# Patient Record
Sex: FEMALE | Race: BLACK OR AFRICAN AMERICAN | NOT HISPANIC OR LATINO | URBAN - METROPOLITAN AREA
[De-identification: names, ages, dates, MRNs, and addresses within clinical notes are randomized per-mention and may not be internally consistent; named-entity substitution may affect disease eponyms.]

---

## 2015-05-15 RX ORDER — METOPROLOL TARTRATE 50 MG
1 TABLET ORAL
Qty: 0 | Refills: 0 | COMMUNITY
Start: 2015-05-15

## 2017-01-11 ENCOUNTER — EMERGENCY (EMERGENCY)
Facility: HOSPITAL | Age: 82
LOS: 1 days | Discharge: HOME | End: 2017-01-11
Attending: EMERGENCY MEDICINE | Admitting: EMERGENCY MEDICINE
Payer: MEDICARE

## 2017-01-11 VITALS
DIASTOLIC BLOOD PRESSURE: 74 MMHG | HEART RATE: 73 BPM | SYSTOLIC BLOOD PRESSURE: 156 MMHG | RESPIRATION RATE: 16 BRPM | TEMPERATURE: 99 F | OXYGEN SATURATION: 98 %

## 2017-01-11 VITALS
RESPIRATION RATE: 18 BRPM | OXYGEN SATURATION: 100 % | SYSTOLIC BLOOD PRESSURE: 190 MMHG | HEART RATE: 74 BPM | TEMPERATURE: 99 F | DIASTOLIC BLOOD PRESSURE: 74 MMHG

## 2017-01-11 LAB
ALBUMIN SERPL ELPH-MCNC: 3.5 G/DL — SIGNIFICANT CHANGE UP (ref 3.3–5)
ALP SERPL-CCNC: 93 U/L — SIGNIFICANT CHANGE UP (ref 40–120)
ALT FLD-CCNC: 9 U/L — SIGNIFICANT CHANGE UP (ref 4–33)
APPEARANCE UR: CLEAR — SIGNIFICANT CHANGE UP
AST SERPL-CCNC: 17 U/L — SIGNIFICANT CHANGE UP (ref 4–32)
BACTERIA # UR AUTO: SIGNIFICANT CHANGE UP
BASE EXCESS BLDV CALC-SCNC: 2.6 MMOL/L — SIGNIFICANT CHANGE UP
BASOPHILS # BLD AUTO: 0.01 K/UL — SIGNIFICANT CHANGE UP (ref 0–0.2)
BASOPHILS NFR BLD AUTO: 0.2 % — SIGNIFICANT CHANGE UP (ref 0–2)
BILIRUB SERPL-MCNC: 0.5 MG/DL — SIGNIFICANT CHANGE UP (ref 0.2–1.2)
BILIRUB UR-MCNC: NEGATIVE — SIGNIFICANT CHANGE UP
BLOOD GAS VENOUS - CREATININE: 1.25 MG/DL — SIGNIFICANT CHANGE UP (ref 0.5–1.3)
BLOOD UR QL VISUAL: NEGATIVE — SIGNIFICANT CHANGE UP
BUN SERPL-MCNC: 14 MG/DL — SIGNIFICANT CHANGE UP (ref 7–23)
CALCIUM SERPL-MCNC: 9.5 MG/DL — SIGNIFICANT CHANGE UP (ref 8.4–10.5)
CHLORIDE BLDV-SCNC: 107 MMOL/L — SIGNIFICANT CHANGE UP (ref 96–108)
CHLORIDE SERPL-SCNC: 101 MMOL/L — SIGNIFICANT CHANGE UP (ref 98–107)
CO2 SERPL-SCNC: 24 MMOL/L — SIGNIFICANT CHANGE UP (ref 22–31)
COLOR SPEC: SIGNIFICANT CHANGE UP
CREAT SERPL-MCNC: 1.3 MG/DL — SIGNIFICANT CHANGE UP (ref 0.5–1.3)
EOSINOPHIL # BLD AUTO: 0.1 K/UL — SIGNIFICANT CHANGE UP (ref 0–0.5)
EOSINOPHIL NFR BLD AUTO: 2.2 % — SIGNIFICANT CHANGE UP (ref 0–6)
GAS PNL BLDV: 138 MMOL/L — SIGNIFICANT CHANGE UP (ref 136–146)
GLUCOSE BLDV-MCNC: 154 — HIGH (ref 70–99)
GLUCOSE SERPL-MCNC: 159 MG/DL — HIGH (ref 70–99)
GLUCOSE UR-MCNC: NEGATIVE — SIGNIFICANT CHANGE UP
HCO3 BLDV-SCNC: 25 MMOL/L — SIGNIFICANT CHANGE UP (ref 20–27)
HCT VFR BLD CALC: 32.6 % — LOW (ref 34.5–45)
HCT VFR BLDV CALC: 33.2 % — LOW (ref 34.5–45)
HGB BLD-MCNC: 10.3 G/DL — LOW (ref 11.5–15.5)
HGB BLDV-MCNC: 10.8 G/DL — LOW (ref 11.5–15.5)
IMM GRANULOCYTES NFR BLD AUTO: 0.2 % — SIGNIFICANT CHANGE UP (ref 0–1.5)
KETONES UR-MCNC: NEGATIVE — SIGNIFICANT CHANGE UP
LACTATE BLDV-MCNC: 1.1 MMOL/L — SIGNIFICANT CHANGE UP (ref 0.5–2)
LEUKOCYTE ESTERASE UR-ACNC: NEGATIVE — SIGNIFICANT CHANGE UP
LYMPHOCYTES # BLD AUTO: 1.65 K/UL — SIGNIFICANT CHANGE UP (ref 1–3.3)
LYMPHOCYTES # BLD AUTO: 36.6 % — SIGNIFICANT CHANGE UP (ref 13–44)
MCHC RBC-ENTMCNC: 28.1 PG — SIGNIFICANT CHANGE UP (ref 27–34)
MCHC RBC-ENTMCNC: 31.6 % — LOW (ref 32–36)
MCV RBC AUTO: 88.8 FL — SIGNIFICANT CHANGE UP (ref 80–100)
MONOCYTES # BLD AUTO: 0.39 K/UL — SIGNIFICANT CHANGE UP (ref 0–0.9)
MONOCYTES NFR BLD AUTO: 8.6 % — SIGNIFICANT CHANGE UP (ref 2–14)
NEUTROPHILS # BLD AUTO: 2.35 K/UL — SIGNIFICANT CHANGE UP (ref 1.8–7.4)
NEUTROPHILS NFR BLD AUTO: 52.2 % — SIGNIFICANT CHANGE UP (ref 43–77)
NITRITE UR-MCNC: NEGATIVE — SIGNIFICANT CHANGE UP
PCO2 BLDV: 47 MMHG — SIGNIFICANT CHANGE UP (ref 41–51)
PH BLDV: 7.38 PH — SIGNIFICANT CHANGE UP (ref 7.32–7.43)
PH UR: 6.5 — SIGNIFICANT CHANGE UP (ref 4.6–8)
PLATELET # BLD AUTO: 312 K/UL — SIGNIFICANT CHANGE UP (ref 150–400)
PMV BLD: 9.7 FL — SIGNIFICANT CHANGE UP (ref 7–13)
PO2 BLDV: < 24 MMHG — LOW (ref 35–40)
POTASSIUM BLDV-SCNC: 3.8 MMOL/L — SIGNIFICANT CHANGE UP (ref 3.4–4.5)
POTASSIUM SERPL-MCNC: 4.2 MMOL/L — SIGNIFICANT CHANGE UP (ref 3.5–5.3)
POTASSIUM SERPL-SCNC: 4.2 MMOL/L — SIGNIFICANT CHANGE UP (ref 3.5–5.3)
PROT SERPL-MCNC: 7.4 G/DL — SIGNIFICANT CHANGE UP (ref 6–8.3)
PROT UR-MCNC: 20 — SIGNIFICANT CHANGE UP
RBC # BLD: 3.67 M/UL — LOW (ref 3.8–5.2)
RBC # FLD: 14.5 % — SIGNIFICANT CHANGE UP (ref 10.3–14.5)
RBC CASTS # UR COMP ASSIST: SIGNIFICANT CHANGE UP (ref 0–?)
SAO2 % BLDV: 34.9 % — LOW (ref 60–85)
SODIUM SERPL-SCNC: 140 MMOL/L — SIGNIFICANT CHANGE UP (ref 135–145)
SP GR SPEC: 1.01 — SIGNIFICANT CHANGE UP (ref 1–1.03)
SQUAMOUS # UR AUTO: SIGNIFICANT CHANGE UP
UROBILINOGEN FLD QL: NORMAL E.U. — SIGNIFICANT CHANGE UP (ref 0.1–0.2)
WBC # BLD: 4.51 K/UL — SIGNIFICANT CHANGE UP (ref 3.8–10.5)
WBC # FLD AUTO: 4.51 K/UL — SIGNIFICANT CHANGE UP (ref 3.8–10.5)
WBC UR QL: SIGNIFICANT CHANGE UP (ref 0–?)

## 2017-01-11 PROCEDURE — 99285 EMERGENCY DEPT VISIT HI MDM: CPT | Mod: 25,GC

## 2017-01-11 PROCEDURE — 74177 CT ABD & PELVIS W/CONTRAST: CPT | Mod: 26

## 2017-01-11 PROCEDURE — 99053 MED SERV 10PM-8AM 24 HR FAC: CPT

## 2017-01-11 RX ORDER — MORPHINE SULFATE 50 MG/1
2 CAPSULE, EXTENDED RELEASE ORAL ONCE
Qty: 0 | Refills: 0 | Status: DISCONTINUED | OUTPATIENT
Start: 2017-01-11 | End: 2017-01-11

## 2017-01-11 RX ORDER — SODIUM CHLORIDE 9 MG/ML
500 INJECTION INTRAMUSCULAR; INTRAVENOUS; SUBCUTANEOUS ONCE
Qty: 0 | Refills: 0 | Status: COMPLETED | OUTPATIENT
Start: 2017-01-11 | End: 2017-01-11

## 2017-01-11 RX ADMIN — MORPHINE SULFATE 2 MILLIGRAM(S): 50 CAPSULE, EXTENDED RELEASE ORAL at 04:04

## 2017-01-11 RX ADMIN — SODIUM CHLORIDE 500 MILLILITER(S): 9 INJECTION INTRAMUSCULAR; INTRAVENOUS; SUBCUTANEOUS at 02:41

## 2017-01-11 RX ADMIN — MORPHINE SULFATE 2 MILLIGRAM(S): 50 CAPSULE, EXTENDED RELEASE ORAL at 02:41

## 2017-01-11 NOTE — ED PROVIDER NOTE - PROGRESS NOTE DETAILS
Radiology resident reports b/l inguinal hernias but no acute process on CT. General surgery consulted (per their records she saw Dr. Nuñez). Will see pt in ED.

## 2017-01-11 NOTE — ED ADULT NURSE NOTE - OBJECTIVE STATEMENT
pt c/o of worsening right groin pain, came from PCP saying she has a inguinal hernia. pt has visible mass in right groin, vitally stable as noted, denies chest pain, SOB, n/v/d, dizziness, headache, abdominal pain, dysuria. pt a/ox3, ambulatory at baseline, lives alone. MD at bedside, awaiting further orders, will continue to monitor.

## 2017-01-11 NOTE — ED ADULT NURSE NOTE - CHIEF COMPLAINT QUOTE
Pt brought in by [facility] c/o groin pain that has worsen today. Pt reports difficulty walking due to pain. Hx of inguinal hernia.

## 2017-01-11 NOTE — ED PROVIDER NOTE - CARE PLAN
Principal Discharge DX:	Inguinal hernia, right  Secondary Diagnosis:	Chronic anemia Principal Discharge DX:	Inguinal hernia, right  Instructions for follow-up, activity and diet:	-Please call Dr. Osmin Land (surgery) for an appointment next Tuesday [phone].  -You can take Tylenol or Motrin over the counter per label instructions with food as needed for pain  Secondary Diagnosis:	Chronic anemia

## 2017-01-11 NOTE — ED PROVIDER NOTE - OBJECTIVE STATEMENT
89F that ambulates with a cane and walker, also history of DM-2, HTN, dyslipidemia, Constipation p/w r abdominal pain for weeks; no vomiting; diarrhea; dysuria; no fever; n; last BM: yesterday - soft; only transient relief from tylenol;     meds: amlopdine, asa 81, gapapentin, insulni, lovasttin, metoprolol, pioglitazone    doctor: Ashleigh Myers 89F that ambulates with a cane and walker, also history of DM-2, HTN, dyslipidemia and right inguinal hernia p/w right abdominal pain for weeks; no vomiting; diarrhea; dysuria; no fever; n; last BM: yesterday - soft; only transient relief from tylenol;     meds: amlopdine, asa 81, gapapentin, insulni, lovasttin, metoprolol, pioglitazone    doctor: Ashleigh Myers 89F that ambulates with a cane and walker, also history of DM-2, HTN, dyslipidemia and right inguinal hernia p/w right abdominal pain for weeks; no vomiting; diarrhea; dysuria; no fever; n; last BM: yesterday - soft; only transient relief from tylenol;     meds: amlodipine, asa 81, Gabapentin, Insulin, Lovastatin, metoprolol, pioglitazone    doctor: Ashleigh Myers

## 2017-01-11 NOTE — ED PROVIDER NOTE - PLAN OF CARE
-Please call Dr. Osmin Land (surgery) for an appointment next Tuesday [phone].  -You can take Tylenol or Motrin over the counter per label instructions with food as needed for pain

## 2017-01-11 NOTE — ED PROVIDER NOTE - PMH
Arthritis    DM (Diabetes Mellitus)    Hypercholesterolemia    Hypertension    Hypoglycemia associated with diabetes    Osteoarthritis

## 2017-01-11 NOTE — ED PROVIDER NOTE - ATTENDING CONTRIBUTION TO CARE
I was physically present for the E/M service provided. I agree with above history, physical, and plan which I have reviewed and edited where appropriate. I was physically present for the key portions of the service provided.    89 y/o female with PMH of right inguinal hernia p/w several weeks of intermittent lower abdominal pain. No N/V/D or dysuria. Pt reports she has seen a surgeon outpt and is pending a CT. Pt lives alone ambulates with a cane. States the pain is becoming intolerable and she is having trouble managing ADLs.  +Suprapubic TTP, abd soft, reducible right inguinal hernia  Will check CT a/p, UA and labs  Pt may require social support, SW, PT/OT eval and gen surg eval

## 2017-01-11 NOTE — ED ADULT TRIAGE NOTE - CHIEF COMPLAINT QUOTE
Pt brought in by [facility] c/o groin pain that has worsen today. Pt reports difficulty walking due to pain. Hx of inguinal hernia.

## 2017-01-17 ENCOUNTER — APPOINTMENT (OUTPATIENT)
Dept: TRAUMA SURGERY | Facility: CLINIC | Age: 82
End: 2017-01-17

## 2017-02-16 ENCOUNTER — EMERGENCY (EMERGENCY)
Facility: HOSPITAL | Age: 82
LOS: 1 days | Discharge: HOME | End: 2017-02-16
Attending: EMERGENCY MEDICINE | Admitting: EMERGENCY MEDICINE
Payer: MEDICARE

## 2017-02-16 VITALS
TEMPERATURE: 98 F | OXYGEN SATURATION: 99 % | DIASTOLIC BLOOD PRESSURE: 62 MMHG | SYSTOLIC BLOOD PRESSURE: 139 MMHG | HEART RATE: 88 BPM | RESPIRATION RATE: 18 BRPM

## 2017-02-16 VITALS
SYSTOLIC BLOOD PRESSURE: 135 MMHG | DIASTOLIC BLOOD PRESSURE: 55 MMHG | HEART RATE: 74 BPM | OXYGEN SATURATION: 98 % | RESPIRATION RATE: 17 BRPM

## 2017-02-16 LAB
ALBUMIN SERPL ELPH-MCNC: 3.5 G/DL — SIGNIFICANT CHANGE UP (ref 3.3–5)
ALP SERPL-CCNC: 83 U/L — SIGNIFICANT CHANGE UP (ref 40–120)
ALT FLD-CCNC: 9 U/L — SIGNIFICANT CHANGE UP (ref 4–33)
APPEARANCE UR: CLEAR — SIGNIFICANT CHANGE UP
AST SERPL-CCNC: 13 U/L — SIGNIFICANT CHANGE UP (ref 4–32)
BACTERIA # UR AUTO: SIGNIFICANT CHANGE UP
BASE EXCESS BLDV CALC-SCNC: 2.6 MMOL/L — SIGNIFICANT CHANGE UP
BASOPHILS # BLD AUTO: 0.01 K/UL — SIGNIFICANT CHANGE UP (ref 0–0.2)
BASOPHILS NFR BLD AUTO: 0.2 % — SIGNIFICANT CHANGE UP (ref 0–2)
BILIRUB SERPL-MCNC: 1.3 MG/DL — HIGH (ref 0.2–1.2)
BILIRUB UR-MCNC: NEGATIVE — SIGNIFICANT CHANGE UP
BLOOD GAS VENOUS - CREATININE: 1.55 MG/DL — HIGH (ref 0.5–1.3)
BLOOD UR QL VISUAL: NEGATIVE — SIGNIFICANT CHANGE UP
BUN SERPL-MCNC: 17 MG/DL — SIGNIFICANT CHANGE UP (ref 7–23)
CALCIUM SERPL-MCNC: 8.7 MG/DL — SIGNIFICANT CHANGE UP (ref 8.4–10.5)
CHLORIDE BLDV-SCNC: 102 MMOL/L — SIGNIFICANT CHANGE UP (ref 96–108)
CHLORIDE SERPL-SCNC: 94 MMOL/L — LOW (ref 98–107)
CO2 SERPL-SCNC: 27 MMOL/L — SIGNIFICANT CHANGE UP (ref 22–31)
COLOR SPEC: SIGNIFICANT CHANGE UP
CREAT SERPL-MCNC: 1.51 MG/DL — HIGH (ref 0.5–1.3)
EOSINOPHIL # BLD AUTO: 0.04 K/UL — SIGNIFICANT CHANGE UP (ref 0–0.5)
EOSINOPHIL NFR BLD AUTO: 0.6 % — SIGNIFICANT CHANGE UP (ref 0–6)
GAS PNL BLDV: 130 MMOL/L — LOW (ref 136–146)
GLUCOSE BLDV-MCNC: 327 — HIGH (ref 70–99)
GLUCOSE SERPL-MCNC: 331 MG/DL — HIGH (ref 70–99)
GLUCOSE UR-MCNC: 1000 — HIGH
HCO3 BLDV-SCNC: 25 MMOL/L — SIGNIFICANT CHANGE UP (ref 20–27)
HCT VFR BLD CALC: 30.7 % — LOW (ref 34.5–45)
HCT VFR BLDV CALC: 32.4 % — LOW (ref 34.5–45)
HGB BLD-MCNC: 10.2 G/DL — LOW (ref 11.5–15.5)
HGB BLDV-MCNC: 10.5 G/DL — LOW (ref 11.5–15.5)
HYALINE CASTS # UR AUTO: SIGNIFICANT CHANGE UP (ref 0–?)
IMM GRANULOCYTES NFR BLD AUTO: 0.3 % — SIGNIFICANT CHANGE UP (ref 0–1.5)
KETONES UR-MCNC: NEGATIVE — SIGNIFICANT CHANGE UP
LACTATE BLDV-MCNC: 2.1 MMOL/L — HIGH (ref 0.5–2)
LEUKOCYTE ESTERASE UR-ACNC: NEGATIVE — SIGNIFICANT CHANGE UP
LIDOCAIN IGE QN: 31.8 U/L — SIGNIFICANT CHANGE UP (ref 7–60)
LYMPHOCYTES # BLD AUTO: 1.33 K/UL — SIGNIFICANT CHANGE UP (ref 1–3.3)
LYMPHOCYTES # BLD AUTO: 21.3 % — SIGNIFICANT CHANGE UP (ref 13–44)
MCHC RBC-ENTMCNC: 28.6 PG — SIGNIFICANT CHANGE UP (ref 27–34)
MCHC RBC-ENTMCNC: 33.2 % — SIGNIFICANT CHANGE UP (ref 32–36)
MCV RBC AUTO: 86 FL — SIGNIFICANT CHANGE UP (ref 80–100)
MONOCYTES # BLD AUTO: 0.78 K/UL — SIGNIFICANT CHANGE UP (ref 0–0.9)
MONOCYTES NFR BLD AUTO: 12.5 % — SIGNIFICANT CHANGE UP (ref 2–14)
NEUTROPHILS # BLD AUTO: 4.05 K/UL — SIGNIFICANT CHANGE UP (ref 1.8–7.4)
NEUTROPHILS NFR BLD AUTO: 65.1 % — SIGNIFICANT CHANGE UP (ref 43–77)
NITRITE UR-MCNC: NEGATIVE — SIGNIFICANT CHANGE UP
PCO2 BLDV: 51 MMHG — SIGNIFICANT CHANGE UP (ref 41–51)
PH BLDV: 7.36 PH — SIGNIFICANT CHANGE UP (ref 7.32–7.43)
PH UR: 6.5 — SIGNIFICANT CHANGE UP (ref 4.6–8)
PLATELET # BLD AUTO: 297 K/UL — SIGNIFICANT CHANGE UP (ref 150–400)
PMV BLD: 9.5 FL — SIGNIFICANT CHANGE UP (ref 7–13)
PO2 BLDV: < 24 MMHG — LOW (ref 35–40)
POTASSIUM BLDV-SCNC: 4.1 MMOL/L — SIGNIFICANT CHANGE UP (ref 3.4–4.5)
POTASSIUM SERPL-MCNC: 4.4 MMOL/L — SIGNIFICANT CHANGE UP (ref 3.5–5.3)
POTASSIUM SERPL-SCNC: 4.4 MMOL/L — SIGNIFICANT CHANGE UP (ref 3.5–5.3)
PROT SERPL-MCNC: 7.4 G/DL — SIGNIFICANT CHANGE UP (ref 6–8.3)
PROT UR-MCNC: 20 — SIGNIFICANT CHANGE UP
RBC # BLD: 3.57 M/UL — LOW (ref 3.8–5.2)
RBC # FLD: 14.8 % — HIGH (ref 10.3–14.5)
RBC CASTS # UR COMP ASSIST: SIGNIFICANT CHANGE UP (ref 0–?)
SAO2 % BLDV: 23 % — LOW (ref 60–85)
SODIUM SERPL-SCNC: 135 MMOL/L — SIGNIFICANT CHANGE UP (ref 135–145)
SP GR SPEC: 1 — SIGNIFICANT CHANGE UP (ref 1–1.03)
SQUAMOUS # UR AUTO: SIGNIFICANT CHANGE UP
UROBILINOGEN FLD QL: NORMAL E.U. — SIGNIFICANT CHANGE UP (ref 0.1–0.2)
WBC # BLD: 6.23 K/UL — SIGNIFICANT CHANGE UP (ref 3.8–10.5)
WBC # FLD AUTO: 6.23 K/UL — SIGNIFICANT CHANGE UP (ref 3.8–10.5)
WBC UR QL: SIGNIFICANT CHANGE UP (ref 0–?)

## 2017-02-16 PROCEDURE — 99284 EMERGENCY DEPT VISIT MOD MDM: CPT | Mod: GC

## 2017-02-16 RX ORDER — MORPHINE SULFATE 50 MG/1
4 CAPSULE, EXTENDED RELEASE ORAL ONCE
Qty: 0 | Refills: 0 | Status: DISCONTINUED | OUTPATIENT
Start: 2017-02-16 | End: 2017-02-16

## 2017-02-16 RX ORDER — ONDANSETRON 8 MG/1
4 TABLET, FILM COATED ORAL ONCE
Qty: 0 | Refills: 0 | Status: COMPLETED | OUTPATIENT
Start: 2017-02-16 | End: 2017-02-16

## 2017-02-16 RX ORDER — SODIUM CHLORIDE 9 MG/ML
1000 INJECTION INTRAMUSCULAR; INTRAVENOUS; SUBCUTANEOUS ONCE
Qty: 0 | Refills: 0 | Status: COMPLETED | OUTPATIENT
Start: 2017-02-16 | End: 2017-02-16

## 2017-02-16 RX ADMIN — MORPHINE SULFATE 4 MILLIGRAM(S): 50 CAPSULE, EXTENDED RELEASE ORAL at 13:10

## 2017-02-16 RX ADMIN — MORPHINE SULFATE 4 MILLIGRAM(S): 50 CAPSULE, EXTENDED RELEASE ORAL at 13:27

## 2017-02-16 RX ADMIN — SODIUM CHLORIDE 1000 MILLILITER(S): 9 INJECTION INTRAMUSCULAR; INTRAVENOUS; SUBCUTANEOUS at 13:11

## 2017-02-16 RX ADMIN — ONDANSETRON 4 MILLIGRAM(S): 8 TABLET, FILM COATED ORAL at 13:11

## 2017-02-16 NOTE — ED ADULT TRIAGE NOTE - CHIEF COMPLAINT QUOTE
Patient brought to ER from home by EMS for hernia pain. Pt has had c/o abdominal pain and has had it for a while and has been evaluated for same about a month ago.

## 2017-02-16 NOTE — ED PROVIDER NOTE - MEDICAL DECISION MAKING DETAILS
89 y/o F with bilateral reducible inguinal hernias with no tenderness and no signs/symptoms of incarceration. Will check labs for any indications of ischemia-cbc, cmp, vbg w/ lactate 89 y/o F with bilateral reducible inguinal hernias with no tenderness and no signs/symptoms of incarceration. Will check labs for any indications of ischemia-cbc, cmp, vbg w/ lactate  Attendin89 y/o with NT bilateral hernias. Basic labs, lactate, surgical consult if warranted.

## 2017-02-16 NOTE — ED ADULT NURSE NOTE - OBJECTIVE STATEMENT
Received pt into spot #17. Pt A/o x 3 calm cooperative, no acute distress noted. Pt c/o "hernia pain" stating she has 2 hernias that have been hurting her for "months". Pt points to RLQ and suprapubic area. Areas noted to have a bulge -hernia like. Positive bowel sounds all quads. pt denies any N/V/D. Son at bedside. Pt eval by Dr. Nettles. Bloodwork sent as ordered. Received pt into spot #17. Pt A/o x 3 calm cooperative, no acute distress noted. Pt c/o "hernia pain" stating she has 2 hernias that have been hurting her for "months". Pt points to RLQ and suprapubic area. Areas noted to have a bulge -hernia like. Positive bowel sounds all quads. pt denies any N/V/D. Skin intact. Son at bedside. Pt eval by Dr. Nettles. Bloodwork sent as ordered.

## 2017-02-16 NOTE — ED PROVIDER NOTE - OBJECTIVE STATEMENT
89 y/o F DM2, HTN, HLD presents with bilateral inguinal hernias. Is here today because she was having pain from hernias and had to call son to help her cook breakfast and get dressed. She does not have any assistance. Patient has been seen in [facility] ED for same reason in past. Scheduled to have surgery with Dr. Alanis on 3/6/16 and states she was supposed to go for presurgical testing today. Last BM today. Denies fever, chills, nausea, vomiting, diarrhea. 89 y/o F DM2, HTN, HLD presents with bilateral inguinal hernias. Is here today because she was having pain from hernias and had to call son to help her cook breakfast and get dressed. She does not have any assistance. Patient has been seen in [facility] ED for same reason in past. Scheduled to have surgery at [facility] on 3/5/16 and states she was supposed to go for presurgical testing today. Last BM today. Denies fever, chills, nausea, vomiting, diarrhea.

## 2017-02-16 NOTE — ED PROVIDER NOTE - ATTENDING CONTRIBUTION TO CARE
Attending note: I have discussed with the resident the following: medical history associated with presenting event, assessment, PMH, physical examination, conclusions. Agree with the proposed medical action plan and management strategy and conclusions. I have personally interviewed and examined the patient. Have reviewed the EMR where available.

## 2017-02-16 NOTE — ED ADULT NURSE REASSESSMENT NOTE - NS ED NURSE REASSESS COMMENT FT1
pt received from MARYCARMEN Olsen, AO x3, unable to ambulate by herself, c/o abdominal pain. Evaluated by provider. Due medication given as ordered. Pt denies any pain or discomfort by now. VSS at her baseline. Will continue to monitor.

## 2017-02-16 NOTE — ED ADULT NURSE REASSESSMENT NOTE - NS ED NURSE REASSESS COMMENT FT1
pt AO x3, unable to ambulate by herself, received from MARYCARMEN Olsen, just evaluated by provider. Awaiting further study.

## 2017-02-16 NOTE — ED PROVIDER NOTE - PHYSICAL EXAMINATION
Attending Note: 89 y/o female presents w/ c/o bilateral inguinal hernia, wants surgery. PMH DM2, HTN, HLD. Was having pain from hernias PTA, called son to help her cook breakfast & get dressed. She has no home assistance. Presently asymptomatic. Seen in [facility] ED for same reason in past. Presently scheduled to have surgery with Dr. Alanis 3/6/16 and was supposed to go for presurgical testing today. Last BM today, passing gas, no melena or blood per rectum. Denies: fever, chills, N/V, diarrhea, increase abdominal girth. Hernias NT to touch, neg erythema, induration or fluctuance.

## 2017-02-28 ENCOUNTER — OUTPATIENT (OUTPATIENT)
Dept: OUTPATIENT SERVICES | Facility: HOSPITAL | Age: 82
LOS: 1 days | End: 2017-02-28
Payer: COMMERCIAL

## 2017-02-28 VITALS
RESPIRATION RATE: 18 BRPM | WEIGHT: 143.96 LBS | DIASTOLIC BLOOD PRESSURE: 66 MMHG | TEMPERATURE: 97 F | OXYGEN SATURATION: 97 % | SYSTOLIC BLOOD PRESSURE: 145 MMHG | HEART RATE: 72 BPM | HEIGHT: 62 IN

## 2017-02-28 DIAGNOSIS — K40.20 BILATERAL INGUINAL HERNIA, WITHOUT OBSTRUCTION OR GANGRENE, NOT SPECIFIED AS RECURRENT: ICD-10-CM

## 2017-02-28 DIAGNOSIS — Z01.818 ENCOUNTER FOR OTHER PREPROCEDURAL EXAMINATION: ICD-10-CM

## 2017-02-28 PROCEDURE — G0463: CPT

## 2017-02-28 NOTE — H&P PST ADULT - NSANTHOSAYNRD_GEN_A_CORE
No. NITO screening performed.  STOP BANG Legend: 0-2 = LOW Risk; 3-4 = INTERMEDIATE Risk; 5-8 = HIGH Risk

## 2017-02-28 NOTE — H&P PST ADULT - PMH
Appendicitis    Arthritis    DM (Diabetes Mellitus)    Hypercholesterolemia    Hypertension    Hypoglycemia associated with diabetes    Osteoarthritis

## 2017-03-07 ENCOUNTER — OUTPATIENT (OUTPATIENT)
Dept: OUTPATIENT SERVICES | Facility: HOSPITAL | Age: 82
LOS: 1 days | End: 2017-03-07
Payer: COMMERCIAL

## 2017-03-07 DIAGNOSIS — Z01.810 ENCOUNTER FOR PREPROCEDURAL CARDIOVASCULAR EXAMINATION: ICD-10-CM

## 2017-03-08 ENCOUNTER — OUTPATIENT (OUTPATIENT)
Dept: OUTPATIENT SERVICES | Facility: HOSPITAL | Age: 82
LOS: 1 days | Discharge: HOME | End: 2017-03-08
Payer: COMMERCIAL

## 2017-03-08 ENCOUNTER — TRANSCRIPTION ENCOUNTER (OUTPATIENT)
Age: 82
End: 2017-03-08

## 2017-03-08 VITALS
OXYGEN SATURATION: 97 % | HEART RATE: 67 BPM | TEMPERATURE: 98 F | SYSTOLIC BLOOD PRESSURE: 115 MMHG | DIASTOLIC BLOOD PRESSURE: 41 MMHG | RESPIRATION RATE: 16 BRPM

## 2017-03-08 VITALS
TEMPERATURE: 97 F | SYSTOLIC BLOOD PRESSURE: 142 MMHG | WEIGHT: 143.96 LBS | RESPIRATION RATE: 18 BRPM | HEART RATE: 69 BPM | DIASTOLIC BLOOD PRESSURE: 76 MMHG | OXYGEN SATURATION: 100 % | HEIGHT: 62 IN

## 2017-03-08 DIAGNOSIS — Z01.818 ENCOUNTER FOR OTHER PREPROCEDURAL EXAMINATION: ICD-10-CM

## 2017-03-08 DIAGNOSIS — K40.20 BILATERAL INGUINAL HERNIA, WITHOUT OBSTRUCTION OR GANGRENE, NOT SPECIFIED AS RECURRENT: ICD-10-CM

## 2017-03-08 PROCEDURE — 49505 PRP I/HERN INIT REDUC >5 YR: CPT | Mod: AS,50

## 2017-03-08 PROCEDURE — A9502: CPT

## 2017-03-08 PROCEDURE — C1781: CPT

## 2017-03-08 PROCEDURE — 78452 HT MUSCLE IMAGE SPECT MULT: CPT

## 2017-03-08 PROCEDURE — 93017 CV STRESS TEST TRACING ONLY: CPT

## 2017-03-08 PROCEDURE — 49505 PRP I/HERN INIT REDUC >5 YR: CPT | Mod: 50

## 2017-03-08 RX ORDER — HYDROMORPHONE HYDROCHLORIDE 2 MG/ML
0.1 INJECTION INTRAMUSCULAR; INTRAVENOUS; SUBCUTANEOUS
Qty: 0 | Refills: 0 | Status: DISCONTINUED | OUTPATIENT
Start: 2017-03-08 | End: 2017-03-08

## 2017-03-08 RX ORDER — ACETAMINOPHEN WITH CODEINE 300MG-30MG
1 TABLET ORAL EVERY 6 HOURS
Qty: 0 | Refills: 0 | Status: DISCONTINUED | OUTPATIENT
Start: 2017-03-08 | End: 2017-03-08

## 2017-03-08 RX ORDER — ACETAMINOPHEN WITH CODEINE 300MG-30MG
1 TABLET ORAL
Qty: 20 | Refills: 0 | OUTPATIENT
Start: 2017-03-08

## 2017-03-08 RX ORDER — SODIUM CHLORIDE 9 MG/ML
3 INJECTION INTRAMUSCULAR; INTRAVENOUS; SUBCUTANEOUS EVERY 8 HOURS
Qty: 0 | Refills: 0 | Status: DISCONTINUED | OUTPATIENT
Start: 2017-03-08 | End: 2017-03-08

## 2017-03-08 RX ORDER — SODIUM CHLORIDE 9 MG/ML
1000 INJECTION, SOLUTION INTRAVENOUS
Qty: 0 | Refills: 0 | Status: DISCONTINUED | OUTPATIENT
Start: 2017-03-08 | End: 2017-03-08

## 2017-03-08 RX ORDER — ONDANSETRON 8 MG/1
4 TABLET, FILM COATED ORAL EVERY 6 HOURS
Qty: 0 | Refills: 0 | Status: DISCONTINUED | OUTPATIENT
Start: 2017-03-08 | End: 2017-03-16

## 2017-03-08 NOTE — ASU DISCHARGE PLAN (ADULT/PEDIATRIC). - NOTIFY
Swelling that continues/Bleeding that does not stop/Fever greater than 101 Swelling that continues/Fever greater than 101/Bleeding that does not stop/Pain not relieved by Medications/Unable to Urinate

## 2017-03-08 NOTE — ASU DISCHARGE PLAN (ADULT/PEDIATRIC). - MEDICATION SUMMARY - MEDICATIONS TO TAKE
I will START or STAY ON the medications listed below when I get home from the hospital:    acetaminophen 325 mg oral tablet  -- 2 tab(s) by mouth every 6 hours, As needed, Severe Pain (7 - 10)  -- Indication: For Mild pain    aspirin 81 mg oral tablet, chewable  -- 1 tab(s) by mouth once a day  -- Indication: For Prophylaxis    acetaminophen-codeine 300 mg-30 mg oral tablet  -- 1 tab(s) by mouth every 6 hours, As needed, Moderate Pain (4 - 6) -for moderate pain MDD:4  -- Indication: For Moderate pain    benazepril 5 mg oral tablet  -- 1 tab(s) by mouth once a day  -- Indication: For HTN    pioglitazone 15 mg oral tablet  -- 1 tab(s) by mouth once a day  -- Indication: For DM    insulin glargine 100 units/mL subcutaneous solution  -- 8 unit(s) subcutaneous once a day (at bedtime)  -- Indication: For DM    lovastatin 20 mg oral tablet  -- 1 tab(s) by mouth once a day  -- Indication: For Hyperlipidemia    metoprolol tartrate 25 mg oral tablet  -- 1 tab(s) by mouth 2 times a day  -- Indication: For HTN    Norvasc 5 mg oral tablet  -- 1 tab(s) by mouth once a day  -- Indication: For HTN    docusate sodium 100 mg oral capsule  -- 1 cap(s) by mouth 3 times a day  -- Indication: For Constipation    Calcium 600+D oral tablet  -- 1 tab(s) by mouth 2 times a day  -- Indication: For Osteopenia

## 2017-03-15 DIAGNOSIS — K40.20 BILATERAL INGUINAL HERNIA, WITHOUT OBSTRUCTION OR GANGRENE, NOT SPECIFIED AS RECURRENT: ICD-10-CM

## 2017-03-15 DIAGNOSIS — E11.9 TYPE 2 DIABETES MELLITUS WITHOUT COMPLICATIONS: ICD-10-CM

## 2017-03-15 DIAGNOSIS — M19.90 UNSPECIFIED OSTEOARTHRITIS, UNSPECIFIED SITE: ICD-10-CM

## 2017-03-15 DIAGNOSIS — I10 ESSENTIAL (PRIMARY) HYPERTENSION: ICD-10-CM

## 2017-10-03 ENCOUNTER — EMERGENCY (EMERGENCY)
Facility: HOSPITAL | Age: 82
LOS: 1 days | Discharge: HOME | End: 2017-10-03
Attending: EMERGENCY MEDICINE | Admitting: EMERGENCY MEDICINE
Payer: MEDICARE

## 2017-10-03 VITALS
HEART RATE: 72 BPM | TEMPERATURE: 98 F | DIASTOLIC BLOOD PRESSURE: 66 MMHG | OXYGEN SATURATION: 100 % | RESPIRATION RATE: 18 BRPM | SYSTOLIC BLOOD PRESSURE: 188 MMHG

## 2017-10-03 LAB
ALBUMIN SERPL ELPH-MCNC: 3.6 G/DL — SIGNIFICANT CHANGE UP (ref 3.3–5)
ALP SERPL-CCNC: 98 U/L — SIGNIFICANT CHANGE UP (ref 40–120)
ALT FLD-CCNC: 9 U/L — SIGNIFICANT CHANGE UP (ref 4–33)
AST SERPL-CCNC: 18 U/L — SIGNIFICANT CHANGE UP (ref 4–32)
BASE EXCESS BLDV CALC-SCNC: 3.2 MMOL/L — SIGNIFICANT CHANGE UP
BASOPHILS # BLD AUTO: 0.03 K/UL — SIGNIFICANT CHANGE UP (ref 0–0.2)
BASOPHILS NFR BLD AUTO: 0.6 % — SIGNIFICANT CHANGE UP (ref 0–2)
BILIRUB SERPL-MCNC: 0.6 MG/DL — SIGNIFICANT CHANGE UP (ref 0.2–1.2)
BLOOD GAS VENOUS - CREATININE: 1.15 MG/DL — SIGNIFICANT CHANGE UP (ref 0.5–1.3)
BUN SERPL-MCNC: 15 MG/DL — SIGNIFICANT CHANGE UP (ref 7–23)
CALCIUM SERPL-MCNC: 8.8 MG/DL — SIGNIFICANT CHANGE UP (ref 8.4–10.5)
CHLORIDE BLDV-SCNC: 100 MMOL/L — SIGNIFICANT CHANGE UP (ref 96–108)
CHLORIDE SERPL-SCNC: 98 MMOL/L — SIGNIFICANT CHANGE UP (ref 98–107)
CO2 SERPL-SCNC: 25 MMOL/L — SIGNIFICANT CHANGE UP (ref 22–31)
CREAT SERPL-MCNC: 1.27 MG/DL — SIGNIFICANT CHANGE UP (ref 0.5–1.3)
EOSINOPHIL # BLD AUTO: 0.16 K/UL — SIGNIFICANT CHANGE UP (ref 0–0.5)
EOSINOPHIL NFR BLD AUTO: 3.1 % — SIGNIFICANT CHANGE UP (ref 0–6)
GAS PNL BLDV: 135 MMOL/L — LOW (ref 136–146)
GLUCOSE BLDV-MCNC: 187 — HIGH (ref 70–99)
GLUCOSE SERPL-MCNC: 183 MG/DL — HIGH (ref 70–99)
HCO3 BLDV-SCNC: 25 MMOL/L — SIGNIFICANT CHANGE UP (ref 20–27)
HCT VFR BLD CALC: 33.6 % — LOW (ref 34.5–45)
HCT VFR BLDV CALC: 34.3 % — LOW (ref 34.5–45)
HGB BLD-MCNC: 10.9 G/DL — LOW (ref 11.5–15.5)
HGB BLDV-MCNC: 11.1 G/DL — LOW (ref 11.5–15.5)
IMM GRANULOCYTES # BLD AUTO: 0.01 # — SIGNIFICANT CHANGE UP
IMM GRANULOCYTES NFR BLD AUTO: 0.2 % — SIGNIFICANT CHANGE UP (ref 0–1.5)
LACTATE BLDV-MCNC: 0.9 MMOL/L — SIGNIFICANT CHANGE UP (ref 0.5–2)
LYMPHOCYTES # BLD AUTO: 1.96 K/UL — SIGNIFICANT CHANGE UP (ref 1–3.3)
LYMPHOCYTES # BLD AUTO: 38.4 % — SIGNIFICANT CHANGE UP (ref 13–44)
MCHC RBC-ENTMCNC: 29.1 PG — SIGNIFICANT CHANGE UP (ref 27–34)
MCHC RBC-ENTMCNC: 32.4 % — SIGNIFICANT CHANGE UP (ref 32–36)
MCV RBC AUTO: 89.8 FL — SIGNIFICANT CHANGE UP (ref 80–100)
MONOCYTES # BLD AUTO: 0.6 K/UL — SIGNIFICANT CHANGE UP (ref 0–0.9)
MONOCYTES NFR BLD AUTO: 11.8 % — SIGNIFICANT CHANGE UP (ref 2–14)
NEUTROPHILS # BLD AUTO: 2.34 K/UL — SIGNIFICANT CHANGE UP (ref 1.8–7.4)
NEUTROPHILS NFR BLD AUTO: 45.9 % — SIGNIFICANT CHANGE UP (ref 43–77)
NRBC # FLD: 0 — SIGNIFICANT CHANGE UP
PCO2 BLDV: 56 MMHG — HIGH (ref 41–51)
PH BLDV: 7.33 PH — SIGNIFICANT CHANGE UP (ref 7.32–7.43)
PLATELET # BLD AUTO: 269 K/UL — SIGNIFICANT CHANGE UP (ref 150–400)
PMV BLD: 10 FL — SIGNIFICANT CHANGE UP (ref 7–13)
PO2 BLDV: < 24 MMHG — LOW (ref 35–40)
POTASSIUM BLDV-SCNC: 4.6 MMOL/L — HIGH (ref 3.4–4.5)
POTASSIUM SERPL-MCNC: 4.7 MMOL/L — SIGNIFICANT CHANGE UP (ref 3.5–5.3)
POTASSIUM SERPL-SCNC: 4.7 MMOL/L — SIGNIFICANT CHANGE UP (ref 3.5–5.3)
PROT SERPL-MCNC: 7.3 G/DL — SIGNIFICANT CHANGE UP (ref 6–8.3)
RBC # BLD: 3.74 M/UL — LOW (ref 3.8–5.2)
RBC # FLD: 14.9 % — HIGH (ref 10.3–14.5)
SAO2 % BLDV: 30.5 % — LOW (ref 60–85)
SODIUM SERPL-SCNC: 135 MMOL/L — SIGNIFICANT CHANGE UP (ref 135–145)
WBC # BLD: 5.1 K/UL — SIGNIFICANT CHANGE UP (ref 3.8–10.5)
WBC # FLD AUTO: 5.1 K/UL — SIGNIFICANT CHANGE UP (ref 3.8–10.5)

## 2017-10-03 PROCEDURE — 99284 EMERGENCY DEPT VISIT MOD MDM: CPT

## 2017-10-03 NOTE — ED ADULT NURSE NOTE - OBJECTIVE STATEMENT
Received pt in spot 15. [id number]. Pt states "I haven't been able to have a bowel movement in four months". Sent in with note from MD stating xray shows significant amount of stool in colon. Pt states pain is mostly in RLQ but sometimes radiates to left side. Denies N/V. Vs as noted. IV placed, labs sent. Son at bedside. Rpt given to primary RN Risa. Will monitor.

## 2017-10-03 NOTE — ED ADULT NURSE NOTE - CHIEF COMPLAINT QUOTE
c/o generalized abd pain "for about a month."  saw PMD last week and diagnosed w constipation.  pt given meds but they didn't help.  Sent by pmd today for "significant residual stool in colon."  denies nausea ,vomiting.  well appearing female in nad. c/o generalized abd pain "for about a month."  saw PMD last week and diagnosed w constipation.  pt given meds but they didn't help.  Sent by pmd today for "significant residual stool in colon."  denies nausea ,vomiting.  well appearing female in nad.    Henrik (son)- [phone]

## 2017-10-03 NOTE — ED ADULT TRIAGE NOTE - CHIEF COMPLAINT QUOTE
c/o generalized abd pain "for about a month."  saw PMD last week and diagnosed w constipation.  pt given meds but they didn't help.  Sent by pmd today for "significant residual stool in colon."  denies nausea ,vomiting.  well appearing female in nad.

## 2017-10-03 NOTE — ED PROVIDER NOTE - ATTENDING CONTRIBUTION TO CARE
DR. JESUS, ATTENDING MD-  I performed a face to face bedside interview with patient regarding history of present illness, review of symptoms and past medical history. I completed an independent physical exam.  I have discussed patient's plan of care with PA.   Documentation as above in the note.      Sterling: 89 yo c/o constipation.  Poor historian, denies vomiting, was eating pudding when I entered room.  Abd soft, but mildly TTP rlq, no rebound/guarding.  Otherwise benign exam.  Most likely just constipated, however given her advanced age and her unreliable history will check a CT

## 2017-10-04 VITALS
HEART RATE: 77 BPM | RESPIRATION RATE: 16 BRPM | TEMPERATURE: 98 F | OXYGEN SATURATION: 100 % | DIASTOLIC BLOOD PRESSURE: 72 MMHG | SYSTOLIC BLOOD PRESSURE: 156 MMHG

## 2017-10-04 PROCEDURE — 74177 CT ABD & PELVIS W/CONTRAST: CPT | Mod: 26

## 2018-01-01 ENCOUNTER — OUTPATIENT (OUTPATIENT)
Dept: OUTPATIENT SERVICES | Facility: HOSPITAL | Age: 83
LOS: 1 days | End: 2018-01-01

## 2018-01-26 ENCOUNTER — EMERGENCY (EMERGENCY)
Facility: HOSPITAL | Age: 83
LOS: 1 days | Discharge: HOME | End: 2018-01-26
Attending: EMERGENCY MEDICINE | Admitting: EMERGENCY MEDICINE
Payer: MEDICARE

## 2018-01-26 VITALS
SYSTOLIC BLOOD PRESSURE: 173 MMHG | HEART RATE: 88 BPM | RESPIRATION RATE: 18 BRPM | TEMPERATURE: 98 F | OXYGEN SATURATION: 98 % | DIASTOLIC BLOOD PRESSURE: 92 MMHG

## 2018-01-26 VITALS
DIASTOLIC BLOOD PRESSURE: 62 MMHG | HEART RATE: 70 BPM | SYSTOLIC BLOOD PRESSURE: 138 MMHG | TEMPERATURE: 97 F | RESPIRATION RATE: 16 BRPM | OXYGEN SATURATION: 100 %

## 2018-01-26 DIAGNOSIS — R69 ILLNESS, UNSPECIFIED: ICD-10-CM

## 2018-01-26 LAB
ALBUMIN SERPL ELPH-MCNC: 3.5 G/DL — SIGNIFICANT CHANGE UP (ref 3.3–5)
ALP SERPL-CCNC: 95 U/L — SIGNIFICANT CHANGE UP (ref 40–120)
ALT FLD-CCNC: 9 U/L — SIGNIFICANT CHANGE UP (ref 4–33)
APPEARANCE UR: CLEAR — SIGNIFICANT CHANGE UP
AST SERPL-CCNC: 18 U/L — SIGNIFICANT CHANGE UP (ref 4–32)
BACTERIA # UR AUTO: SIGNIFICANT CHANGE UP
BASE EXCESS BLDV CALC-SCNC: 2.1 MMOL/L — SIGNIFICANT CHANGE UP
BASOPHILS # BLD AUTO: 0.03 K/UL — SIGNIFICANT CHANGE UP (ref 0–0.2)
BASOPHILS NFR BLD AUTO: 0.6 % — SIGNIFICANT CHANGE UP (ref 0–2)
BILIRUB SERPL-MCNC: 0.7 MG/DL — SIGNIFICANT CHANGE UP (ref 0.2–1.2)
BILIRUB UR-MCNC: NEGATIVE — SIGNIFICANT CHANGE UP
BLOOD GAS VENOUS - CREATININE: 1.2 MG/DL — SIGNIFICANT CHANGE UP (ref 0.5–1.3)
BLOOD UR QL VISUAL: NEGATIVE — SIGNIFICANT CHANGE UP
BUN SERPL-MCNC: 15 MG/DL — SIGNIFICANT CHANGE UP (ref 7–23)
CALCIUM SERPL-MCNC: 8.6 MG/DL — SIGNIFICANT CHANGE UP (ref 8.4–10.5)
CHLORIDE BLDV-SCNC: 108 MMOL/L — SIGNIFICANT CHANGE UP (ref 96–108)
CHLORIDE SERPL-SCNC: 104 MMOL/L — SIGNIFICANT CHANGE UP (ref 98–107)
CO2 SERPL-SCNC: 25 MMOL/L — SIGNIFICANT CHANGE UP (ref 22–31)
COLOR SPEC: SIGNIFICANT CHANGE UP
CREAT SERPL-MCNC: 1.32 MG/DL — HIGH (ref 0.5–1.3)
EOSINOPHIL # BLD AUTO: 0.16 K/UL — SIGNIFICANT CHANGE UP (ref 0–0.5)
EOSINOPHIL NFR BLD AUTO: 3.1 % — SIGNIFICANT CHANGE UP (ref 0–6)
GAS PNL BLDV: 142 MMOL/L — SIGNIFICANT CHANGE UP (ref 136–146)
GLUCOSE BLDV-MCNC: 144 — HIGH (ref 70–99)
GLUCOSE SERPL-MCNC: 140 MG/DL — HIGH (ref 70–99)
GLUCOSE UR-MCNC: NEGATIVE — SIGNIFICANT CHANGE UP
HCO3 BLDV-SCNC: 24 MMOL/L — SIGNIFICANT CHANGE UP (ref 20–27)
HCT VFR BLD CALC: 34.3 % — LOW (ref 34.5–45)
HCT VFR BLDV CALC: 35.6 % — SIGNIFICANT CHANGE UP (ref 34.5–45)
HGB BLD-MCNC: 11.4 G/DL — LOW (ref 11.5–15.5)
HGB BLDV-MCNC: 11.5 G/DL — SIGNIFICANT CHANGE UP (ref 11.5–15.5)
HYALINE CASTS # UR AUTO: SIGNIFICANT CHANGE UP (ref 0–?)
IMM GRANULOCYTES # BLD AUTO: 0.02 # — SIGNIFICANT CHANGE UP
IMM GRANULOCYTES NFR BLD AUTO: 0.4 % — SIGNIFICANT CHANGE UP (ref 0–1.5)
KETONES UR-MCNC: NEGATIVE — SIGNIFICANT CHANGE UP
LACTATE BLDV-MCNC: 1.3 MMOL/L — SIGNIFICANT CHANGE UP (ref 0.5–2)
LEUKOCYTE ESTERASE UR-ACNC: NEGATIVE — SIGNIFICANT CHANGE UP
LIDOCAIN IGE QN: 33.6 U/L — SIGNIFICANT CHANGE UP (ref 7–60)
LYMPHOCYTES # BLD AUTO: 1.56 K/UL — SIGNIFICANT CHANGE UP (ref 1–3.3)
LYMPHOCYTES # BLD AUTO: 30.4 % — SIGNIFICANT CHANGE UP (ref 13–44)
MCHC RBC-ENTMCNC: 30.2 PG — SIGNIFICANT CHANGE UP (ref 27–34)
MCHC RBC-ENTMCNC: 33.2 % — SIGNIFICANT CHANGE UP (ref 32–36)
MCV RBC AUTO: 90.7 FL — SIGNIFICANT CHANGE UP (ref 80–100)
MONOCYTES # BLD AUTO: 0.43 K/UL — SIGNIFICANT CHANGE UP (ref 0–0.9)
MONOCYTES NFR BLD AUTO: 8.4 % — SIGNIFICANT CHANGE UP (ref 2–14)
MUCOUS THREADS # UR AUTO: SIGNIFICANT CHANGE UP
NEUTROPHILS # BLD AUTO: 2.94 K/UL — SIGNIFICANT CHANGE UP (ref 1.8–7.4)
NEUTROPHILS NFR BLD AUTO: 57.1 % — SIGNIFICANT CHANGE UP (ref 43–77)
NITRITE UR-MCNC: NEGATIVE — SIGNIFICANT CHANGE UP
NON-SQ EPI CELLS # UR AUTO: <1 — SIGNIFICANT CHANGE UP
NRBC # FLD: 0 — SIGNIFICANT CHANGE UP
PCO2 BLDV: 50 MMHG — SIGNIFICANT CHANGE UP (ref 41–51)
PH BLDV: 7.36 PH — SIGNIFICANT CHANGE UP (ref 7.32–7.43)
PH UR: 6.5 — SIGNIFICANT CHANGE UP (ref 4.6–8)
PLATELET # BLD AUTO: 286 K/UL — SIGNIFICANT CHANGE UP (ref 150–400)
PMV BLD: 10 FL — SIGNIFICANT CHANGE UP (ref 7–13)
PO2 BLDV: < 24 MMHG — LOW (ref 35–40)
POTASSIUM BLDV-SCNC: 4.3 MMOL/L — SIGNIFICANT CHANGE UP (ref 3.4–4.5)
POTASSIUM SERPL-MCNC: 4.5 MMOL/L — SIGNIFICANT CHANGE UP (ref 3.5–5.3)
POTASSIUM SERPL-SCNC: 4.5 MMOL/L — SIGNIFICANT CHANGE UP (ref 3.5–5.3)
PROT SERPL-MCNC: 7.3 G/DL — SIGNIFICANT CHANGE UP (ref 6–8.3)
PROT UR-MCNC: 20 MG/DL — SIGNIFICANT CHANGE UP
RBC # BLD: 3.78 M/UL — LOW (ref 3.8–5.2)
RBC # FLD: 14.7 % — HIGH (ref 10.3–14.5)
RBC CASTS # UR COMP ASSIST: SIGNIFICANT CHANGE UP (ref 0–?)
SAO2 % BLDV: 29.2 % — LOW (ref 60–85)
SODIUM SERPL-SCNC: 143 MMOL/L — SIGNIFICANT CHANGE UP (ref 135–145)
SP GR SPEC: 1.01 — SIGNIFICANT CHANGE UP (ref 1–1.04)
SQUAMOUS # UR AUTO: SIGNIFICANT CHANGE UP
UROBILINOGEN FLD QL: NORMAL MG/DL — SIGNIFICANT CHANGE UP
WBC # BLD: 5.14 K/UL — SIGNIFICANT CHANGE UP (ref 3.8–10.5)
WBC # FLD AUTO: 5.14 K/UL — SIGNIFICANT CHANGE UP (ref 3.8–10.5)
WBC UR QL: SIGNIFICANT CHANGE UP (ref 0–?)

## 2018-01-26 PROCEDURE — 73030 X-RAY EXAM OF SHOULDER: CPT | Mod: 26,RT

## 2018-01-26 PROCEDURE — 70450 CT HEAD/BRAIN W/O DYE: CPT | Mod: 26

## 2018-01-26 PROCEDURE — 71045 X-RAY EXAM CHEST 1 VIEW: CPT | Mod: 26

## 2018-01-26 PROCEDURE — 72125 CT NECK SPINE W/O DYE: CPT | Mod: 26

## 2018-01-26 PROCEDURE — 99284 EMERGENCY DEPT VISIT MOD MDM: CPT | Mod: 25,GC

## 2018-01-26 PROCEDURE — 74177 CT ABD & PELVIS W/CONTRAST: CPT | Mod: 26

## 2018-01-26 RX ORDER — ACETAMINOPHEN 500 MG
1000 TABLET ORAL ONCE
Qty: 0 | Refills: 0 | Status: COMPLETED | OUTPATIENT
Start: 2018-01-26 | End: 2018-01-26

## 2018-01-26 RX ADMIN — Medication 400 MILLIGRAM(S): at 06:48

## 2018-01-26 NOTE — ED PROVIDER NOTE - PHYSICAL EXAMINATION
5x5 hematoma R frontal w/o overlying lac/abrasion 5x5 hematoma R frontal w/o overlying lac/abrasion    Sylvester att: nad, perrl, pos rt frontal hematoma, neg neck ttp, ctabl, neg chest wall ttp, rrr, s1s2, abd soft ntnd, pelvis stable, neg le edema

## 2018-01-26 NOTE — ED PROVIDER NOTE - PLAN OF CARE
Call your primary care doctor today or tomorrow to schedule follow up appointment for within the next 3-5 days, if you do not have a doctor call the Primary Care Clinic to establish a doctor. The phone number is [phone]. Continue taking your medications as prescribed. Return to this Emergency Department if you experience worsening condition or any other emergency.

## 2018-01-26 NOTE — ED PROVIDER NOTE - ATTENDING CONTRIBUTION TO CARE
Dr. Phan: I have personally seen and examined this patient at the bedside. I have fully participated in the care of this patient. I have reviewed all pertinent clinical information, including history, physical exam, plan and the Resident's note and agree except as noted. HPI above as by me. PE above as by me. Trauma, ? syncope  PLAN ct head, ct abd, cxr, labs, ekg DISPO discuss inpt v outpt syncope w/u with fam.

## 2018-01-26 NOTE — ED PROVIDER NOTE - CARE PLAN
Assessment and plan of treatment:	Call your primary care doctor today or tomorrow to schedule follow up appointment for within the next 3-5 days, if you do not have a doctor call the Primary Care Clinic to establish a doctor. The phone number is [phone]. Continue taking your medications as prescribed. Return to this Emergency Department if you experience worsening condition or any other emergency. Principal Discharge DX:	Fall, initial encounter  Assessment and plan of treatment:	Call your primary care doctor today or tomorrow to schedule follow up appointment for within the next 3-5 days, if you do not have a doctor call the Primary Care Clinic to establish a doctor. The phone number is [phone]. Continue taking your medications as prescribed. Return to this Emergency Department if you experience worsening condition or any other emergency.

## 2018-01-26 NOTE — ED PROVIDER NOTE - PROGRESS NOTE DETAILS
Pt received at signout; she states that she does not have significant pain along her head anymore from her fall. She notes that her son "should" be on his way to the hospital and that she will be able to go home without concern if discharged as she has home health aides. She states that her landlord likely called and informed her son to come to the hospital already, but he does not drive and will have to take public transportation and it is unclear when he will arrive. Physical exam is stable with previously recorded exam, no evidence of worsening, patient is lucid, awaiting CT results. JAYME Leyva PGY1 Pt received at signout; she states that she does not have significant pain along her head anymore from her fall. She notes that her son "should" be on his way to the hospital. She states that her landlord likely called and informed her son to come to the hospital already, but he does not drive and will have to take public transportation and it is unclear when he will arrive. Physical exam is stable with previously recorded exam, no evidence of worsening, patient is lucid, awaiting CT results. JAYME Leyva PGY1 CT results arrive, no obvious etiology for the patient's fall. Pt re-evaluated, checked w/ family who is en route to the hospital, will d/c patient at this time. Henrik (son) contact # is [phone].  JAYME Leyva PGY1 Pt CT results all negative w/ suggestion of non-emergent CT scan in the future. Discussed w/ patient, will provide copy of studies at discharge. At this time patient's son Jose Guadalupe is en route. JAYME Leyva PGY1

## 2018-01-26 NOTE — ED PROVIDER NOTE - OBJECTIVE STATEMENT
89 y/o F w/ Hx DM, HTN pf fall.  Pt stood from sleep to use restroom, s/p fall from standing, hit frontal R, unclear LOC.  Woke to baseline on floor, unable to get up, called EMS and bought to ED.  Pt complains only of pain to head.  Denies CP, SOB, AP, n/v, numbness/weakness, fever, prior to or following event.  Denies AC use. 89 y/o F w/ Hx DM, HTN pf fall.  Pt stood from sleep to use restroom, s/p fall from standing, hit frontal R, unclear LOC.  Woke to baseline on floor, unable to get up, called EMS and bought to ED.  Pt complains only of pain to head.  Denies CP, SOB, AP, n/v, numbness/weakness, fever, prior to or following event.  Denies AC use.    06:45 Sylvester att: 89F h/o htn, dm s/p fall p/w forehead hematoma. Earlier this morning patient went to bathroom, reports she stood and then + loc. Unwitnessed fall. Denies cp, sob, neck pain, abd pain, blood thinners.

## 2018-01-26 NOTE — ED PROVIDER NOTE - MEDICAL DECISION MAKING DETAILS
pt s/p fall, eval for traumatic abdominal injury, hip/pelvic fx, ICH, cranial fx, imaging pending, APAP for pain.

## 2018-01-26 NOTE — ED ADULT TRIAGE NOTE - CHIEF COMPLAINT QUOTE
Patient brought from home s/p slip and fall. Patient reports hitting her head, unknown LOC, but was able to press life alert button for help. Patient denies any blood thinners. Patient denies any chest pain or dizziness currently. Patient c/o pain to head. Bump noted to right forehead. Hx. DM, HTN.

## 2018-03-29 ENCOUNTER — RESULT REVIEW (OUTPATIENT)
Age: 83
End: 2018-03-29

## 2018-05-01 ENCOUNTER — OUTPATIENT (OUTPATIENT)
Dept: OUTPATIENT SERVICES | Facility: HOSPITAL | Age: 83
LOS: 1 days | End: 2018-05-01
Payer: MEDICAID

## 2018-05-01 PROCEDURE — G9001: CPT

## 2018-05-10 DIAGNOSIS — R69 ILLNESS, UNSPECIFIED: ICD-10-CM

## 2018-10-01 ENCOUNTER — OUTPATIENT (OUTPATIENT)
Dept: OUTPATIENT SERVICES | Facility: HOSPITAL | Age: 83
LOS: 1 days | End: 2018-10-01
Payer: MEDICARE

## 2018-10-01 PROCEDURE — G9001: CPT

## 2018-10-29 ENCOUNTER — INPATIENT (INPATIENT)
Facility: HOSPITAL | Age: 83
LOS: 3 days | Discharge: HOME | End: 2018-11-02
Attending: INTERNAL MEDICINE | Admitting: INTERNAL MEDICINE
Payer: MEDICARE

## 2018-10-29 VITALS
DIASTOLIC BLOOD PRESSURE: 41 MMHG | HEART RATE: 85 BPM | OXYGEN SATURATION: 97 % | RESPIRATION RATE: 17 BRPM | TEMPERATURE: 98 F | SYSTOLIC BLOOD PRESSURE: 99 MMHG

## 2018-10-29 DIAGNOSIS — Z98.41 CATARACT EXTRACTION STATUS, RIGHT EYE: Chronic | ICD-10-CM

## 2018-10-29 DIAGNOSIS — D64.9 ANEMIA, UNSPECIFIED: ICD-10-CM

## 2018-10-29 DIAGNOSIS — Z29.9 ENCOUNTER FOR PROPHYLACTIC MEASURES, UNSPECIFIED: ICD-10-CM

## 2018-10-29 DIAGNOSIS — E11.9 TYPE 2 DIABETES MELLITUS WITHOUT COMPLICATIONS: ICD-10-CM

## 2018-10-29 DIAGNOSIS — Z98.890 OTHER SPECIFIED POSTPROCEDURAL STATES: Chronic | ICD-10-CM

## 2018-10-29 DIAGNOSIS — N39.0 URINARY TRACT INFECTION, SITE NOT SPECIFIED: ICD-10-CM

## 2018-10-29 DIAGNOSIS — E78.00 PURE HYPERCHOLESTEROLEMIA, UNSPECIFIED: ICD-10-CM

## 2018-10-29 DIAGNOSIS — I10 ESSENTIAL (PRIMARY) HYPERTENSION: ICD-10-CM

## 2018-10-29 DIAGNOSIS — R55 SYNCOPE AND COLLAPSE: ICD-10-CM

## 2018-10-29 DIAGNOSIS — I24.9 ACUTE ISCHEMIC HEART DISEASE, UNSPECIFIED: ICD-10-CM

## 2018-10-29 LAB
ALBUMIN SERPL ELPH-MCNC: 3.5 G/DL — SIGNIFICANT CHANGE UP (ref 3.3–5)
ALP SERPL-CCNC: 90 U/L — SIGNIFICANT CHANGE UP (ref 40–120)
ALT FLD-CCNC: 11 U/L — SIGNIFICANT CHANGE UP (ref 4–33)
APPEARANCE UR: SIGNIFICANT CHANGE UP
APTT BLD: 28.7 SEC — SIGNIFICANT CHANGE UP (ref 27.5–37.4)
AST SERPL-CCNC: 19 U/L — SIGNIFICANT CHANGE UP (ref 4–32)
BACTERIA # UR AUTO: HIGH
BASOPHILS # BLD AUTO: 0.03 K/UL — SIGNIFICANT CHANGE UP (ref 0–0.2)
BASOPHILS NFR BLD AUTO: 0.4 % — SIGNIFICANT CHANGE UP (ref 0–2)
BILIRUB SERPL-MCNC: 1.6 MG/DL — HIGH (ref 0.2–1.2)
BILIRUB UR-MCNC: NEGATIVE — SIGNIFICANT CHANGE UP
BLOOD UR QL VISUAL: NEGATIVE — SIGNIFICANT CHANGE UP
BUN SERPL-MCNC: 12 MG/DL — SIGNIFICANT CHANGE UP (ref 7–23)
CALCIUM SERPL-MCNC: 8.6 MG/DL — SIGNIFICANT CHANGE UP (ref 8.4–10.5)
CHLORIDE SERPL-SCNC: 100 MMOL/L — SIGNIFICANT CHANGE UP (ref 98–107)
CK MB BLD-MCNC: 1.3 — SIGNIFICANT CHANGE UP (ref 0–2.5)
CK MB BLD-MCNC: 2.72 NG/ML — SIGNIFICANT CHANGE UP (ref 1–4.7)
CK SERPL-CCNC: 203 U/L — HIGH (ref 25–170)
CK SERPL-CCNC: 217 U/L — HIGH (ref 25–170)
CO2 SERPL-SCNC: 24 MMOL/L — SIGNIFICANT CHANGE UP (ref 22–31)
COLOR SPEC: SIGNIFICANT CHANGE UP
CREAT SERPL-MCNC: 1.3 MG/DL — SIGNIFICANT CHANGE UP (ref 0.5–1.3)
EOSINOPHIL # BLD AUTO: 0.04 K/UL — SIGNIFICANT CHANGE UP (ref 0–0.5)
EOSINOPHIL NFR BLD AUTO: 0.6 % — SIGNIFICANT CHANGE UP (ref 0–6)
GLUCOSE BLDC GLUCOMTR-MCNC: 227 MG/DL — HIGH (ref 70–99)
GLUCOSE SERPL-MCNC: 180 MG/DL — HIGH (ref 70–99)
GLUCOSE UR-MCNC: NEGATIVE — SIGNIFICANT CHANGE UP
HCT VFR BLD CALC: 32.9 % — LOW (ref 34.5–45)
HGB BLD-MCNC: 10.6 G/DL — LOW (ref 11.5–15.5)
HYALINE CASTS # UR AUTO: NEGATIVE — SIGNIFICANT CHANGE UP
IMM GRANULOCYTES # BLD AUTO: 0.03 # — SIGNIFICANT CHANGE UP
IMM GRANULOCYTES NFR BLD AUTO: 0.4 % — SIGNIFICANT CHANGE UP (ref 0–1.5)
INR BLD: 1.07 — SIGNIFICANT CHANGE UP (ref 0.88–1.17)
KETONES UR-MCNC: NEGATIVE — SIGNIFICANT CHANGE UP
LEUKOCYTE ESTERASE UR-ACNC: SIGNIFICANT CHANGE UP
LYMPHOCYTES # BLD AUTO: 1.24 K/UL — SIGNIFICANT CHANGE UP (ref 1–3.3)
LYMPHOCYTES # BLD AUTO: 17.3 % — SIGNIFICANT CHANGE UP (ref 13–44)
MCHC RBC-ENTMCNC: 29.5 PG — SIGNIFICANT CHANGE UP (ref 27–34)
MCHC RBC-ENTMCNC: 32.2 % — SIGNIFICANT CHANGE UP (ref 32–36)
MCV RBC AUTO: 91.6 FL — SIGNIFICANT CHANGE UP (ref 80–100)
MONOCYTES # BLD AUTO: 0.62 K/UL — SIGNIFICANT CHANGE UP (ref 0–0.9)
MONOCYTES NFR BLD AUTO: 8.6 % — SIGNIFICANT CHANGE UP (ref 2–14)
NEUTROPHILS # BLD AUTO: 5.21 K/UL — SIGNIFICANT CHANGE UP (ref 1.8–7.4)
NEUTROPHILS NFR BLD AUTO: 72.7 % — SIGNIFICANT CHANGE UP (ref 43–77)
NITRITE UR-MCNC: NEGATIVE — SIGNIFICANT CHANGE UP
NRBC # FLD: 0 — SIGNIFICANT CHANGE UP
PH UR: 7 — SIGNIFICANT CHANGE UP (ref 5–8)
PLATELET # BLD AUTO: 218 K/UL — SIGNIFICANT CHANGE UP (ref 150–400)
PMV BLD: 10.5 FL — SIGNIFICANT CHANGE UP (ref 7–13)
POTASSIUM SERPL-MCNC: 4.8 MMOL/L — SIGNIFICANT CHANGE UP (ref 3.5–5.3)
POTASSIUM SERPL-SCNC: 4.8 MMOL/L — SIGNIFICANT CHANGE UP (ref 3.5–5.3)
PROT SERPL-MCNC: 7.1 G/DL — SIGNIFICANT CHANGE UP (ref 6–8.3)
PROT UR-MCNC: 10 — SIGNIFICANT CHANGE UP
PROTHROM AB SERPL-ACNC: 11.9 SEC — SIGNIFICANT CHANGE UP (ref 9.8–13.1)
RBC # BLD: 3.59 M/UL — LOW (ref 3.8–5.2)
RBC # FLD: 15.3 % — HIGH (ref 10.3–14.5)
RBC CASTS # UR COMP ASSIST: SIGNIFICANT CHANGE UP (ref 0–?)
SODIUM SERPL-SCNC: 138 MMOL/L — SIGNIFICANT CHANGE UP (ref 135–145)
SP GR SPEC: 1.02 — SIGNIFICANT CHANGE UP (ref 1–1.04)
SQUAMOUS # UR AUTO: SIGNIFICANT CHANGE UP
TROPONIN T, HIGH SENSITIVITY: 32 NG/L — SIGNIFICANT CHANGE UP (ref ?–14)
TROPONIN T, HIGH SENSITIVITY: 34 NG/L — SIGNIFICANT CHANGE UP (ref ?–14)
UROBILINOGEN FLD QL: NORMAL — SIGNIFICANT CHANGE UP
WBC # BLD: 7.17 K/UL — SIGNIFICANT CHANGE UP (ref 3.8–10.5)
WBC # FLD AUTO: 7.17 K/UL — SIGNIFICANT CHANGE UP (ref 3.8–10.5)
WBC UR QL: >50 — HIGH (ref 0–?)

## 2018-10-29 PROCEDURE — 70450 CT HEAD/BRAIN W/O DYE: CPT | Mod: 26

## 2018-10-29 PROCEDURE — 74177 CT ABD & PELVIS W/CONTRAST: CPT | Mod: 26

## 2018-10-29 PROCEDURE — 73521 X-RAY EXAM HIPS BI 2 VIEWS: CPT | Mod: 26

## 2018-10-29 PROCEDURE — 71046 X-RAY EXAM CHEST 2 VIEWS: CPT | Mod: 26

## 2018-10-29 PROCEDURE — 72125 CT NECK SPINE W/O DYE: CPT | Mod: 26

## 2018-10-29 PROCEDURE — 73562 X-RAY EXAM OF KNEE 3: CPT | Mod: 26,50

## 2018-10-29 RX ORDER — INSULIN LISPRO 100/ML
VIAL (ML) SUBCUTANEOUS AT BEDTIME
Qty: 0 | Refills: 0 | Status: DISCONTINUED | OUTPATIENT
Start: 2018-10-29 | End: 2018-11-02

## 2018-10-29 RX ORDER — INSULIN GLARGINE 100 [IU]/ML
10 INJECTION, SOLUTION SUBCUTANEOUS AT BEDTIME
Qty: 0 | Refills: 0 | Status: DISCONTINUED | OUTPATIENT
Start: 2018-10-29 | End: 2018-11-02

## 2018-10-29 RX ORDER — LISINOPRIL 2.5 MG/1
5 TABLET ORAL DAILY
Qty: 0 | Refills: 0 | Status: DISCONTINUED | OUTPATIENT
Start: 2018-10-29 | End: 2018-10-31

## 2018-10-29 RX ORDER — DEXTROSE 50 % IN WATER 50 %
25 SYRINGE (ML) INTRAVENOUS ONCE
Qty: 0 | Refills: 0 | Status: DISCONTINUED | OUTPATIENT
Start: 2018-10-29 | End: 2018-11-02

## 2018-10-29 RX ORDER — ATORVASTATIN CALCIUM 80 MG/1
10 TABLET, FILM COATED ORAL AT BEDTIME
Qty: 0 | Refills: 0 | Status: DISCONTINUED | OUTPATIENT
Start: 2018-10-29 | End: 2018-11-02

## 2018-10-29 RX ORDER — INSULIN GLARGINE 100 [IU]/ML
10 INJECTION, SOLUTION SUBCUTANEOUS EVERY MORNING
Qty: 0 | Refills: 0 | Status: DISCONTINUED | OUTPATIENT
Start: 2018-10-30 | End: 2018-11-02

## 2018-10-29 RX ORDER — INSULIN LISPRO 100/ML
VIAL (ML) SUBCUTANEOUS
Qty: 0 | Refills: 0 | Status: DISCONTINUED | OUTPATIENT
Start: 2018-10-29 | End: 2018-11-02

## 2018-10-29 RX ORDER — METOPROLOL TARTRATE 50 MG
25 TABLET ORAL DAILY
Qty: 0 | Refills: 0 | Status: DISCONTINUED | OUTPATIENT
Start: 2018-10-29 | End: 2018-11-02

## 2018-10-29 RX ORDER — ASPIRIN/CALCIUM CARB/MAGNESIUM 324 MG
81 TABLET ORAL DAILY
Qty: 0 | Refills: 0 | Status: DISCONTINUED | OUTPATIENT
Start: 2018-10-29 | End: 2018-11-02

## 2018-10-29 RX ORDER — AMLODIPINE BESYLATE 2.5 MG/1
5 TABLET ORAL DAILY
Qty: 0 | Refills: 0 | Status: DISCONTINUED | OUTPATIENT
Start: 2018-10-29 | End: 2018-10-29

## 2018-10-29 RX ORDER — SODIUM CHLORIDE 9 MG/ML
1000 INJECTION INTRAMUSCULAR; INTRAVENOUS; SUBCUTANEOUS ONCE
Qty: 0 | Refills: 0 | Status: COMPLETED | OUTPATIENT
Start: 2018-10-29 | End: 2018-10-29

## 2018-10-29 RX ORDER — SODIUM CHLORIDE 9 MG/ML
1000 INJECTION, SOLUTION INTRAVENOUS
Qty: 0 | Refills: 0 | Status: DISCONTINUED | OUTPATIENT
Start: 2018-10-29 | End: 2018-11-02

## 2018-10-29 RX ORDER — GLUCAGON INJECTION, SOLUTION 0.5 MG/.1ML
1 INJECTION, SOLUTION SUBCUTANEOUS ONCE
Qty: 0 | Refills: 0 | Status: DISCONTINUED | OUTPATIENT
Start: 2018-10-29 | End: 2018-11-02

## 2018-10-29 RX ORDER — ENOXAPARIN SODIUM 100 MG/ML
40 INJECTION SUBCUTANEOUS EVERY 24 HOURS
Qty: 0 | Refills: 0 | Status: DISCONTINUED | OUTPATIENT
Start: 2018-10-29 | End: 2018-11-02

## 2018-10-29 RX ORDER — DEXTROSE 50 % IN WATER 50 %
12.5 SYRINGE (ML) INTRAVENOUS ONCE
Qty: 0 | Refills: 0 | Status: DISCONTINUED | OUTPATIENT
Start: 2018-10-29 | End: 2018-11-02

## 2018-10-29 RX ORDER — ACETAMINOPHEN 500 MG
650 TABLET ORAL EVERY 6 HOURS
Qty: 0 | Refills: 0 | Status: DISCONTINUED | OUTPATIENT
Start: 2018-10-29 | End: 2018-11-02

## 2018-10-29 RX ORDER — ASPIRIN/CALCIUM CARB/MAGNESIUM 324 MG
162 TABLET ORAL ONCE
Qty: 0 | Refills: 0 | Status: COMPLETED | OUTPATIENT
Start: 2018-10-29 | End: 2018-10-29

## 2018-10-29 RX ORDER — DOCUSATE SODIUM 100 MG
100 CAPSULE ORAL THREE TIMES A DAY
Qty: 0 | Refills: 0 | Status: DISCONTINUED | OUTPATIENT
Start: 2018-10-29 | End: 2018-11-02

## 2018-10-29 RX ORDER — ACETAMINOPHEN 500 MG
650 TABLET ORAL ONCE
Qty: 0 | Refills: 0 | Status: COMPLETED | OUTPATIENT
Start: 2018-10-29 | End: 2018-10-29

## 2018-10-29 RX ORDER — DEXTROSE 50 % IN WATER 50 %
15 SYRINGE (ML) INTRAVENOUS ONCE
Qty: 0 | Refills: 0 | Status: DISCONTINUED | OUTPATIENT
Start: 2018-10-29 | End: 2018-11-02

## 2018-10-29 RX ADMIN — INSULIN GLARGINE 10 UNIT(S): 100 INJECTION, SOLUTION SUBCUTANEOUS at 22:11

## 2018-10-29 RX ADMIN — Medication 650 MILLIGRAM(S): at 08:11

## 2018-10-29 RX ADMIN — Medication 162 MILLIGRAM(S): at 14:05

## 2018-10-29 RX ADMIN — ATORVASTATIN CALCIUM 10 MILLIGRAM(S): 80 TABLET, FILM COATED ORAL at 22:10

## 2018-10-29 RX ADMIN — SODIUM CHLORIDE 1000 MILLILITER(S): 9 INJECTION INTRAMUSCULAR; INTRAVENOUS; SUBCUTANEOUS at 08:11

## 2018-10-29 RX ADMIN — ENOXAPARIN SODIUM 40 MILLIGRAM(S): 100 INJECTION SUBCUTANEOUS at 22:10

## 2018-10-29 RX ADMIN — Medication 1 TABLET(S): at 18:02

## 2018-10-29 RX ADMIN — Medication 650 MILLIGRAM(S): at 11:27

## 2018-10-29 RX ADMIN — SODIUM CHLORIDE 1000 MILLILITER(S): 9 INJECTION INTRAMUSCULAR; INTRAVENOUS; SUBCUTANEOUS at 11:27

## 2018-10-29 NOTE — H&P ADULT - NEGATIVE RESPIRATORY AND THORAX SYMPTOMS
no dyspnea/no cough/no pleuritic chest pain no dyspnea/no cough/no pleuritic chest pain/no hemoptysis

## 2018-10-29 NOTE — H&P ADULT - NSHPSOCIALHISTORY_GEN_ALL_CORE
Pt is  and lives alone. She has a HHA M/W/F/Sat from 9a-1p. Pt states that she has access to food and is able to take care of herself when her HHA is not there. She is ambulatory with a walker at baseline, but states she does not always use it when she is at home. Denies EtOH, drug or tobacco use.

## 2018-10-29 NOTE — H&P ADULT - PMH
Appendicitis    Arthritis    DM (Diabetes Mellitus)    Hypercholesterolemia    Hypertension    Hypoglycemia associated with diabetes    Osteoarthritis DM (diabetes mellitus)    HLD (hyperlipidemia)    HTN (hypertension)    OA (osteoarthritis)

## 2018-10-29 NOTE — ED PROVIDER NOTE - OBJECTIVE STATEMENT
89 YO F BIBEMS after unwitnessed fall. pt states she will attempt to provide as much HPI as possible however states that she forgets things. pt states that yesterday 10/28, after arriving home from [facility] at 17:30 she attempted to use the restroom. Pt states that she fell at some point, denies syncope/LOC, states for the rest of the evening she was on the floor seated up. states she finally was able to access the phone to call EMS. ambulates with walker at home. pt does not recall if she hit her head. reports pain diffusely, most at knees and hips b/l. denies f/c.

## 2018-10-29 NOTE — H&P ADULT - NEGATIVE OPHTHALMOLOGIC SYMPTOMS
no blurred vision R/no pain L/no diplopia/no pain R/no loss of vision R/no loss of vision L/no blurred vision L

## 2018-10-29 NOTE — H&P ADULT - NEGATIVE GASTROINTESTINAL SYMPTOMS
no constipation/no diarrhea/no vomiting/no hematochezia/no nausea/no abdominal pain no diarrhea/no vomiting/no abdominal pain/no nausea/no hematochezia

## 2018-10-29 NOTE — ED ADULT NURSE NOTE - NSIMPLEMENTINTERV_GEN_ALL_ED
Implemented All Fall with Harm Risk Interventions:  [place] to call system. Call bell, personal items and telephone within reach. Instruct patient to call for assistance. Room bathroom lighting operational. Non-slip footwear when patient is off stretcher. Physically safe environment: no spills, clutter or unnecessary equipment. Stretcher in lowest position, wheels locked, appropriate side rails in place. Provide visual cue, wrist band, yellow gown, etc. Monitor gait and stability. Monitor for mental status changes and reorient to person, place, and time. Review medications for side effects contributing to fall risk. Reinforce activity limits and safety measures with patient and family. Provide visual clues: red socks.

## 2018-10-29 NOTE — H&P ADULT - PROBLEM SELECTOR PLAN 5
Monitor BPs. Continue home meds pending confirmation from pharmacy. Monitor BPs. Continue Metoprolol, Lisinopril and Amlodipine.

## 2018-10-29 NOTE — H&P ADULT - PROBLEM SELECTOR PLAN 6
Check lipid panel. Continue home meds pending confirmation from pharmacy. Check lipid panel. Continue Lovastatin.

## 2018-10-29 NOTE — ED PROVIDER NOTE - PROGRESS NOTE DETAILS
Patient reassessed, NAD, non-toxic appearing. states pain has decreased. awaiting 2nd trop, ua, imaging results.  - Rajan Carlton D.O. PGY1 Dr. Abdullahi leon. admit to tele for ACS workup. results reviewed with patient and need for admission.  - Rajan Carlton D.O. PGY1 discussed results of CT with Dr. Fernando.  Argenis Carlton D.O. PGY1

## 2018-10-29 NOTE — H&P ADULT - HISTORY OF PRESENT ILLNESS
89 y/o F with a PMHx of arthritis, "kidney problems" (pt denies current or h/o HD, but cannot recall exact diagnosis), HTN, HLD and DM presents s/p unwitnessed fall yesterday around 18:00. Pt reports that she was attempting to walk to the restroom when she fell. Although pt participates in interview, history limited as pt states she "forgets things" and "cannot always remember things". Pt states that she did not land on her back and denies head injury, but admits that she is unsure which part of her body she landed on. She currently reports diffuse pain after falling that is worst in her R knee and L shoulder. Pt states that she was unable to stand up after the fall, but that she was able to move herself to a sitting position and move around on the floor. She notes that she remained on the floor until she was able to access her Med Alert button and contact EMS this morning, though she does not recall what time this was. Pt states that she is typically ambulatory with a walker at baseline, but that she does not always use it while she is at home. She denies urinary or fecal incontinence a/w the fall, but notes that she did urinate while she was stuck on the floor as she was unable to stand up to get to the restroom. Pt reports that she has fallen in the past, though she cannot recall the details about these episodes. Additionally, she reports that she has fainted in the past due to not eating as much as she should and states that she "didn't eat much" yesterday, but is unable to provide further details. Denies f/c, change in appetite from baseline, weight change, fatigue, generalized weakness, vision changes, eye pain, hearing changes, tinnitus, sore throat, nasal congestion, SOB, cough, orthopnea, ARNDT, palpitations, chest pain, LE edema, n/v/d, abd pain, changes in chronic constipation, hematochezia, urinary frequency, dysuria, hematuria, rash, headache, dizziness, numbness, tingling, extremity weakness, syncope/LOC or confusion. 89 y/o female with a PMHx of arthritis, "kidney problems" (pt denies current or h/o HD, but cannot recall exact diagnosis), HTN, HLD and DM presents s/p unwitnessed fall yesterday around 18:00. Pt reports that she was attempting to walk to the restroom when she fell. Although pt participates in interview, history limited as pt states she "forgets things" and "cannot always remember things". Pt states that she did not land on her back and denies head injury, but admits that she is unsure which part of her body she landed on. She currently reports diffuse pain after falling that is worst in her R knee and L shoulder. Pt states that she was unable to stand up after the fall, but that she was able to move herself to a sitting position and move around on the floor. She notes that she remained on the floor until she was able to access her Med Alert button and contact EMS this morning, though she does not recall what time this was. Pt states that she is typically ambulatory with a walker at baseline, but that she does not always use it while she is at home. She denies urinary or fecal incontinence a/w the fall, but notes that she did urinate while she was stuck on the floor as she was unable to stand up to get to the restroom. Pt reports that she has fallen in the past, though she cannot recall the details about these episodes. Additionally, she reports that she has fainted in the past due to not eating as much as she should and states that she "didn't eat much" yesterday, but is unable to provide further details. Denies f/c, change in appetite from baseline, weight change, fatigue, generalized weakness, vision changes, eye pain, hearing changes, tinnitus, sore throat, nasal congestion, SOB, cough, orthopnea, ARNDT, palpitations, chest pain, LE edema, n/v/d, abd pain, changes in chronic constipation, hematochezia, urinary frequency, dysuria, hematuria, rash, headache, dizziness, numbness, tingling, extremity weakness, syncope/LOC or confusion.

## 2018-10-29 NOTE — H&P ADULT - PROBLEM SELECTOR PLAN 4
Check HbA1C. Monitor FS TID and cover with ISS prn. Check HbA1C. Hold Pioglitazone. Continue insulin. Monitor FS TID and cover with ISS prn.

## 2018-10-29 NOTE — H&P ADULT - MUSCULOSKELETAL COMMENTS
b/l knees mildly tender to palpation R>L. L shoulder non-tender, ROM intact. evaluation of ROM and strength of b/l LE limited by b/l knee pain.

## 2018-10-29 NOTE — H&P ADULT - PSH
H/O bilateral cataract extraction    H/O eye surgery  L "retina" surgery  S/P appendectomy  w/ ileocolic resection

## 2018-10-29 NOTE — H&P ADULT - NEGATIVE GENERAL SYMPTOMS
no fever/no fatigue/no anorexia/no weight loss/no chills/no weight gain/no polyuria/no polydipsia no chills/no malaise/no anorexia/no fatigue/no polyuria/no polydipsia/no weight loss/no weight gain/no fever

## 2018-10-29 NOTE — ED ADULT NURSE NOTE - OBJECTIVE STATEMENT
float rn note Pt sitting manpreet pt st" I fell last night....don't know how long I was on floor maybe few hours....I don't know why I fell just weak I guess. I live alone use a walker I have aid that comes few days a week comes in the morning at 9am. I don't have chest pains, I am not dizzy...just weak and I just have body pains all over and both my knees really hurt. Don't remember if I hit my head....I don't think I passed out." Pt wearing MED ALERT BUTTON.  Pt isaxox4, Pt j carlos knees slightly swollen smll bruise noted on left. Other wise able to lift legs off stretcher, no obvious deformity. No obvious head injury noted, no bruising.Pt is incont, arrives with saturated diaper. Pt skin is intact, discolored area ? DTI vs incontinence  dermatitis buttock area but is intact.  Pt placed on cm vss, postioned for comfort, sl labs meds as per MD orders. REsident at bedside to brandon. EKG in chart. Call bell with pt, Pt FALL RISK close to RN station. Handoff to Primary rn Amee.

## 2018-10-29 NOTE — H&P ADULT - NSHPLABSRESULTS_GEN_ALL_CORE
10-29    138  |  100  |  12  ----------------------------<  180<H>  4.8   |  24  |  1.30    Ca    8.6      29 Oct 2018 08:15    TPro  7.1  /  Alb  3.5  /  TBili  1.6<H>  /  DBili  x   /  AST  19  /  ALT  11  /  AlkPhos  90  10-29                          10.6   7.17  )-----------( 218      ( 29 Oct 2018 08:15 )             32.9     Trop: 34 --> 32    CARDIAC MARKERS ( 29 Oct 2018 08:15 )  x     / x     / 203 u/L / x     / x        Urinalysis Basic - ( 29 Oct 2018 11:00 )    Color: LIGHT YELLOW / Appearance: HAZY / S.021 / pH: 7.0  Gluc: NEGATIVE / Ketone: NEGATIVE  / Bili: NEGATIVE / Urobili: NORMAL   Blood: NEGATIVE / Protein: 10 / Nitrite: NEGATIVE   Leuk Esterase: LARGE / RBC: 0-2 / WBC >50   Sq Epi: OCC / Non Sq Epi: x / Bacteria: MODERATE    CT A/P w/ IV contrast: No CT evidence of acute vascular injury or solid organ injury in the abdomen or pelvis. Bilateral indeterminate renal lesions, stable since 2018, but   increased from 2014, concerning for renal neoplasms.    CT head: No acute intracranial hemorrhage or mass effect. No displaced calvarial fracture.     CT cervical spine: No acute fracture or traumatic subluxation. No prevertebral soft tissue swelling. Degenerative changes.    XR b/l knees: No acute fracture or dislocation of either knee.    XR b/l hips: No acute fracture or dislocation of the pelvis or either hip. If there is persistent concern for a hip fracture, an MRI may be obtained.    CXR: No focal consolidation or pleural effusion.    EKG: sinus rhythm @ 78 bpm w/ 1st degree AV block EKG: NSR at 78 bpm with 1st degree AV block  CE x2: Trop 34-->32, -->217  H/H: 10.6/32.9  Glucose: 180  Bili: 1.6  UA: Large LE, bacteria, WBC    10/29 CXR: No focal consolidation or pleural effusion.  10/29 X-ray hips: No acute fracture or dislocation of the pelvis or either hip.  10/29 X-ray knees: No acute fracture or dislocation of either knee.  10/29 CT head: No acute intracranial hemorrhage or mass effect. No displaced calvarial fracture.  10/29 CT cervical spine: No acute fracture or traumatic subluxation. No prevertebral soft tissue swelling. Degenerative changes.  10/29 CT abdomen/pelvis: No CT evidence of acute vascular injury or solid organ injury in the abdomen or pelvis. Bilateral indeterminate renal lesions, stable since 01/26/2018, but increased from 2014, concerning for renal neoplasms.

## 2018-10-29 NOTE — ED PROVIDER NOTE - NS ED ROS FT
GENERAL: No fever or chills, //             EYES: no change in vision, //             HEENT: no trouble swallowing or speaking, //             CARDIAC: no chest pain, //              PULMONARY: no cough or SOB, //             GI: no blood in stool //             : No changes in urination,  //            SKIN: no rashes,  //            NEURO: no headache,  //             MSK: hip and knee pain b/l. ~Rajan Carlton DO PGY1

## 2018-10-29 NOTE — ED PROVIDER NOTE - ATTENDING CONTRIBUTION TO CARE
Attending Note (Ebony): patient with likely syncope.  abdomen tenderness.  labs, cts, reassess Attending Note (Ebony): patient with likely syncope. poor historian.  abdomen tenderness.  labs, cts, xrays, reassess

## 2018-10-29 NOTE — H&P ADULT - RS GEN PE MLT RESP DETAILS PC
respirations non-labored/no chest wall tenderness/good air movement/breath sounds equal/clear to auscultation bilaterally/airway patent respirations non-labored/no chest wall tenderness/clear to auscultation bilaterally/good air movement/no rhonchi/airway patent/breath sounds equal/no intercostal retractions/no rales/no wheezes

## 2018-10-29 NOTE — H&P ADULT - ASSESSMENT
89 y/o F with a PMHx of arthritis, "kidney problems" (pt denies current or h/o HD, but cannot recall exact diagnosis), HTN, HLD and DM presents s/p unwitnessed fall yesterday, admitted to further work-up to r/o ACS. 89 y/o female with a PMHx of HTN, HLD, DM, and OA presents to ED S/P unwitnessed fall concerning for a syncopal event.

## 2018-10-29 NOTE — ED ADULT NURSE NOTE - CHIEF COMPLAINT QUOTE
Pt arrives to ED via EMS from home.  Pt lives on her own.  Pt had unwitnessed fall at home called in by a neighbor at 05:30.  Unknown if pt had LOC.  Pt reports, "I'm 89, I'm not sure about everything that happens to me."  Pt went to [employer] today and had a busy day.  Pt c/o of bilateral knee pain from landing on tile floor.  Pt fs= 162 by EMS.  Pt A&Ox3.  EKG in triage.

## 2018-10-29 NOTE — ED ADULT TRIAGE NOTE - CHIEF COMPLAINT QUOTE
Pt arrives to ED via EMS from home.  Pt lives on her own.  Pt had unwitnessed fall at home called in by a neighbor at 05:30.  Unknown if pt had LOC.  Pt reports, "I'm 89, I'm not sure about everything that happens to me."  Pt went to [employer] today and had a busy day.  Pt c/o of bilateral knee pain from landing on tile floor.  Pt fs= 162 by EMS.  Pt A&Ox3.  EKG in triage.

## 2018-10-29 NOTE — H&P ADULT - PROBLEM SELECTOR PLAN 1
Admit pt to telemetry for continuous cardiac monitoring. Pt s/p unwitnessed fall and prolonged period of time on the floor, but does not have full recollection of events. CT negative for acute intracranial hemorrhage or mass effect.  - continue to monitor for trends. Monitor orthostatics. Check CK-MB. Consider Cardiology consult. Admit pt to telemetry for continuous cardiac monitoring. Pt s/p unwitnessed fall and prolonged period of time on the floor, but does not have full recollection of events. CT negative for acute intracranial hemorrhage or mass effect.  - continue to monitor for trends. Monitor orthostatics. Check CK-MB. Echo. Cardiology consult called.

## 2018-10-29 NOTE — H&P ADULT - ATTENDING COMMENTS
seen and examined  above HPI, PMH, SH, FH, Meds, Allergies and ROS noted and personally confirmed as accurate by me  PHYSICAL EXAM: vital signs as above  in no apparent distress  HEENT: ESTER EOMI  Neck: Supple, no JVD, no thyromegaly  Lungs: no rhonchi, no wheeze, no crackles  CVS: S1 S2 soft ejection systolic murmur best heard at left sternal border   Abdomen: no tenderness, no organomegaly, BS present  Neuro: AO x 3 no focal weakness, no sensory abnormalities  Psych: appropriate affect  Skin: warm, dry  Ext: no cyanosis or clubbing, no edema  Msk: no joint swelling or deformities  Back: no CVA tenderness, no kyphosis/scoliosis    Impression and plan as above, discussed with patient in detail, all questions answered  d/w Tele PA  cardiology attending Dr Curry to see

## 2018-10-29 NOTE — ED PROVIDER NOTE - MEDICAL DECISION MAKING DETAILS
89 yo f presents after unwitnessed fall, reports being on floor for extended period, and does not have full recollection of events. afebrile in ED. pain control, labs, imaging.

## 2018-10-30 PROBLEM — K37 UNSPECIFIED APPENDICITIS: Chronic | Status: INACTIVE | Noted: 2017-02-28 | Resolved: 2018-10-29

## 2018-10-30 LAB
BACTERIA UR CULT: SIGNIFICANT CHANGE UP
BUN SERPL-MCNC: 10 MG/DL — SIGNIFICANT CHANGE UP (ref 7–23)
CALCIUM SERPL-MCNC: 8.6 MG/DL — SIGNIFICANT CHANGE UP (ref 8.4–10.5)
CHLORIDE SERPL-SCNC: 104 MMOL/L — SIGNIFICANT CHANGE UP (ref 98–107)
CHOLEST SERPL-MCNC: 137 MG/DL — SIGNIFICANT CHANGE UP (ref 120–199)
CO2 SERPL-SCNC: 25 MMOL/L — SIGNIFICANT CHANGE UP (ref 22–31)
CREAT SERPL-MCNC: 1.22 MG/DL — SIGNIFICANT CHANGE UP (ref 0.5–1.3)
GLUCOSE BLDC GLUCOMTR-MCNC: 110 MG/DL — HIGH (ref 70–99)
GLUCOSE BLDC GLUCOMTR-MCNC: 120 MG/DL — HIGH (ref 70–99)
GLUCOSE BLDC GLUCOMTR-MCNC: 252 MG/DL — HIGH (ref 70–99)
GLUCOSE BLDC GLUCOMTR-MCNC: 97 MG/DL — SIGNIFICANT CHANGE UP (ref 70–99)
GLUCOSE SERPL-MCNC: 96 MG/DL — SIGNIFICANT CHANGE UP (ref 70–99)
HBA1C BLD-MCNC: 6.7 % — HIGH (ref 4–5.6)
HCT VFR BLD CALC: 31.3 % — LOW (ref 34.5–45)
HDLC SERPL-MCNC: 55 MG/DL — SIGNIFICANT CHANGE UP (ref 45–65)
HGB BLD-MCNC: 10.1 G/DL — LOW (ref 11.5–15.5)
LIPID PNL WITH DIRECT LDL SERPL: 77 MG/DL — SIGNIFICANT CHANGE UP
MAGNESIUM SERPL-MCNC: 1 MG/DL — CRITICAL LOW (ref 1.6–2.6)
MCHC RBC-ENTMCNC: 29.2 PG — SIGNIFICANT CHANGE UP (ref 27–34)
MCHC RBC-ENTMCNC: 32.3 % — SIGNIFICANT CHANGE UP (ref 32–36)
MCV RBC AUTO: 90.5 FL — SIGNIFICANT CHANGE UP (ref 80–100)
NRBC # FLD: 0 — SIGNIFICANT CHANGE UP
PLATELET # BLD AUTO: 234 K/UL — SIGNIFICANT CHANGE UP (ref 150–400)
PMV BLD: 10.7 FL — SIGNIFICANT CHANGE UP (ref 7–13)
POTASSIUM SERPL-MCNC: 4.2 MMOL/L — SIGNIFICANT CHANGE UP (ref 3.5–5.3)
POTASSIUM SERPL-SCNC: 4.2 MMOL/L — SIGNIFICANT CHANGE UP (ref 3.5–5.3)
RBC # BLD: 3.46 M/UL — LOW (ref 3.8–5.2)
RBC # FLD: 15.5 % — HIGH (ref 10.3–14.5)
SODIUM SERPL-SCNC: 140 MMOL/L — SIGNIFICANT CHANGE UP (ref 135–145)
SPECIMEN SOURCE: SIGNIFICANT CHANGE UP
TRIGL SERPL-MCNC: 67 MG/DL — SIGNIFICANT CHANGE UP (ref 10–149)
TSH SERPL-MCNC: 2.77 UIU/ML — SIGNIFICANT CHANGE UP (ref 0.27–4.2)
WBC # BLD: 7.02 K/UL — SIGNIFICANT CHANGE UP (ref 3.8–10.5)
WBC # FLD AUTO: 7.02 K/UL — SIGNIFICANT CHANGE UP (ref 3.8–10.5)

## 2018-10-30 RX ORDER — MAGNESIUM OXIDE 400 MG ORAL TABLET 241.3 MG
400 TABLET ORAL
Qty: 0 | Refills: 0 | Status: COMPLETED | OUTPATIENT
Start: 2018-10-30 | End: 2018-10-30

## 2018-10-30 RX ORDER — MAGNESIUM SULFATE 500 MG/ML
1 VIAL (ML) INJECTION ONCE
Qty: 0 | Refills: 0 | Status: COMPLETED | OUTPATIENT
Start: 2018-10-30 | End: 2018-10-30

## 2018-10-30 RX ADMIN — LISINOPRIL 5 MILLIGRAM(S): 2.5 TABLET ORAL at 05:00

## 2018-10-30 RX ADMIN — Medication 3: at 17:13

## 2018-10-30 RX ADMIN — Medication 1 TABLET(S): at 05:00

## 2018-10-30 RX ADMIN — Medication 100 GRAM(S): at 12:15

## 2018-10-30 RX ADMIN — MAGNESIUM OXIDE 400 MG ORAL TABLET 400 MILLIGRAM(S): 241.3 TABLET ORAL at 18:43

## 2018-10-30 RX ADMIN — Medication 81 MILLIGRAM(S): at 12:18

## 2018-10-30 RX ADMIN — Medication 1 TABLET(S): at 18:43

## 2018-10-30 RX ADMIN — Medication 25 MILLIGRAM(S): at 05:00

## 2018-10-30 RX ADMIN — MAGNESIUM OXIDE 400 MG ORAL TABLET 400 MILLIGRAM(S): 241.3 TABLET ORAL at 12:20

## 2018-10-30 RX ADMIN — INSULIN GLARGINE 10 UNIT(S): 100 INJECTION, SOLUTION SUBCUTANEOUS at 07:45

## 2018-10-30 RX ADMIN — MAGNESIUM OXIDE 400 MG ORAL TABLET 400 MILLIGRAM(S): 241.3 TABLET ORAL at 08:53

## 2018-10-30 NOTE — PHYSICAL THERAPY INITIAL EVALUATION ADULT - GAIT DEVIATIONS NOTED, PT EVAL
decreased step length/bilateral knee buckling during swing phase of gait requiring assistance from therapist to stabilize./decreased cherie/decreased weight-shifting ability

## 2018-10-30 NOTE — PHYSICAL THERAPY INITIAL EVALUATION ADULT - ADDITIONAL COMMENTS
as per Pt. Pt. lives in a pvt apartment alone. Pt. previously ambulating independently with a rolling walker. Pt. requires assistance for ADLs from [facility] (Monday/ Wednesday/ Friday/ Saturday from 9-1) Pt. returned supine in bed with all tubes/lines intact, call bell in reach and in NAD.

## 2018-10-30 NOTE — PHYSICAL THERAPY INITIAL EVALUATION ADULT - PERTINENT HX OF CURRENT PROBLEM, REHAB EVAL
Pt. is a 89 year old female admitted to [facility] secondary to acute ischemic heart disease. PMH: DM, HTN, OA.

## 2018-10-30 NOTE — PROVIDER CONTACT NOTE (OTHER) - SITUATION
just came from ed, refusing tele monitor and orthostatics, very annoyed. bp 165/81, says her bp is high because shes aggrevated. otherwise asymptomatic.

## 2018-10-30 NOTE — CONSULT NOTE ADULT - ATTENDING COMMENTS
Patient seen and examined.  Agree with above.  -Admitted with fall; denies syncope  -check TTE - can be done as outpatient  -last NST abnormal - presumed cad was medically managed at that time given advanced age; given no anginal symptoms or clinical heart failure, would continue to medically manage; no repeat ischemic eval needed at this time    Palak Curry MD

## 2018-10-30 NOTE — PHYSICAL THERAPY INITIAL EVALUATION ADULT - MANUAL MUSCLE TESTING RESULTS, REHAB EVAL
Bilateral UE muscle strength grossly 3/5 Throughout; Bilateral LE muscle strength grossly 3-/5 Throughout.

## 2018-10-30 NOTE — PROGRESS NOTE ADULT - SUBJECTIVE AND OBJECTIVE BOX
Patient is a 89y old  Female who presents with a chief complaint of Unwitnessed fall (30 Oct 2018 12:39)    SUBJECTIVE / OVERNIGHT EVENTS: no overnight events    ROS:  Resp: No cough no sputum production  CVS: No chest pain no palpitations no orthopnea  GI: no N/V/D  : no dysuria, no hematuria  Neuro: no weakness no paresthesias  Heme: No petechiae no easy bruising  Msk: No joint pain no swelling  Skin: No rash no itching        MEDICATIONS  (STANDING):  aspirin enteric coated 81 milliGRAM(s) Oral daily  atorvastatin 10 milliGRAM(s) Oral at bedtime  calcium carbonate 1250 mG  + Vitamin D (OsCal 500 + D) 1 Tablet(s) Oral two times a day  dextrose 5%. 1000 milliLiter(s) (50 mL/Hr) IV Continuous <Continuous>  dextrose 50% Injectable 12.5 Gram(s) IV Push once  dextrose 50% Injectable 25 Gram(s) IV Push once  dextrose 50% Injectable 25 Gram(s) IV Push once  enoxaparin Injectable 40 milliGRAM(s) SubCutaneous every 24 hours  insulin glargine Injectable (LANTUS) 10 Unit(s) SubCutaneous every morning  insulin glargine Injectable (LANTUS) 10 Unit(s) SubCutaneous at bedtime  insulin lispro (HumaLOG) corrective regimen sliding scale   SubCutaneous three times a day before meals  insulin lispro (HumaLOG) corrective regimen sliding scale   SubCutaneous at bedtime  lisinopril 5 milliGRAM(s) Oral daily  magnesium oxide 400 milliGRAM(s) Oral three times a day with meals  metoprolol tartrate 25 milliGRAM(s) Oral daily    MEDICATIONS  (PRN):  acetaminophen   Tablet .. 650 milliGRAM(s) Oral every 6 hours PRN Mild Pain (1 - 3), Moderate Pain (4 - 6), Severe Pain (7 - 10)  dextrose 40% Gel 15 Gram(s) Oral once PRN Blood Glucose LESS THAN 70 milliGRAM(s)/deciliter  docusate sodium 100 milliGRAM(s) Oral three times a day PRN Constipation  glucagon  Injectable 1 milliGRAM(s) IntraMuscular once PRN Glucose LESS THAN 70 milligrams/deciliter        CAPILLARY BLOOD GLUCOSE      POCT Blood Glucose.: 120 mg/dL (30 Oct 2018 11:53)  POCT Blood Glucose.: 97 mg/dL (30 Oct 2018 07:30)  POCT Blood Glucose.: 227 mg/dL (29 Oct 2018 21:49)    I&O's Summary      Vital Signs Last 24 Hrs  T(C): 36.9 (30 Oct 2018 14:36), Max: 37.2 (29 Oct 2018 17:56)  T(F): 98.4 (30 Oct 2018 14:36), Max: 99 (29 Oct 2018 22:06)  HR: 70 (30 Oct 2018 14:36) (70 - 86)  BP: 143/73 (30 Oct 2018 14:36) (107/57 - 143/73)  BP(mean): --  RR: 18 (30 Oct 2018 14:36) (16 - 18)  SpO2: 98% (30 Oct 2018 14:36) (98% - 100%)    PHYSICAL EXAM: vital signs as above  in no apparent distress  HEENT: ESTER EOMI  Neck: Supple, no JVD, no thyromegaly  Lungs: no rhonchi, no wheeze, no crackles  CVS: S1 S2 soft ejection systolic murmur best heard at left sternal border   Abdomen: no tenderness, no organomegaly, BS present  Neuro: AO x 3 no focal weakness, no sensory abnormalities  Psych: appropriate affect  Skin: warm, dry  Ext: no cyanosis or clubbing, no edema  Msk: no joint swelling or deformities  Back: no CVA tenderness, no kyphosis/scoliosis    LABS:                        10.1   7.02  )-----------( 234      ( 30 Oct 2018 06:17 )             31.3     10-30    140  |  104  |  10  ----------------------------<  96  4.2   |  25  |  1.22    Ca    8.6      30 Oct 2018 06:17  Mg     1.0     10-30    TPro  7.1  /  Alb  3.5  /  TBili  1.6<H>  /  DBili  x   /  AST  19  /  ALT  11  /  AlkPhos  90  10-29    PT/INR - ( 29 Oct 2018 08:15 )   PT: 11.9 SEC;   INR: 1.07          PTT - ( 29 Oct 2018 08:15 )  PTT:28.7 SEC  CARDIAC MARKERS ( 29 Oct 2018 10:10 )  x     / x     / 217 u/L / 2.72 ng/mL / x      CARDIAC MARKERS ( 29 Oct 2018 08:15 )  x     / x     / 203 u/L / x     / x          Urinalysis Basic - ( 29 Oct 2018 11:00 )    Color: LIGHT YELLOW / Appearance: HAZY / S.021 / pH: 7.0  Gluc: NEGATIVE / Ketone: NEGATIVE  / Bili: NEGATIVE / Urobili: NORMAL   Blood: NEGATIVE / Protein: 10 / Nitrite: NEGATIVE   Leuk Esterase: LARGE / RBC: 0-2 / WBC >50   Sq Epi: OCC / Non Sq Epi: x / Bacteria: MODERATE          All consultant(s) notes reviewed and care discussed with other providers    Contact Number, Dr Fernando [phone]

## 2018-10-31 DIAGNOSIS — N17.9 ACUTE KIDNEY FAILURE, UNSPECIFIED: ICD-10-CM

## 2018-10-31 LAB
BUN SERPL-MCNC: 12 MG/DL — SIGNIFICANT CHANGE UP (ref 7–23)
BUN SERPL-MCNC: 12 MG/DL — SIGNIFICANT CHANGE UP (ref 7–23)
CALCIUM SERPL-MCNC: 8.5 MG/DL — SIGNIFICANT CHANGE UP (ref 8.4–10.5)
CALCIUM SERPL-MCNC: 8.5 MG/DL — SIGNIFICANT CHANGE UP (ref 8.4–10.5)
CHLORIDE SERPL-SCNC: 102 MMOL/L — SIGNIFICANT CHANGE UP (ref 98–107)
CHLORIDE SERPL-SCNC: 102 MMOL/L — SIGNIFICANT CHANGE UP (ref 98–107)
CO2 SERPL-SCNC: 25 MMOL/L — SIGNIFICANT CHANGE UP (ref 22–31)
CO2 SERPL-SCNC: 25 MMOL/L — SIGNIFICANT CHANGE UP (ref 22–31)
CREAT SERPL-MCNC: 1.6 MG/DL — HIGH (ref 0.5–1.3)
CREAT SERPL-MCNC: 1.6 MG/DL — HIGH (ref 0.5–1.3)
FERRITIN SERPL-MCNC: 104.8 NG/ML — SIGNIFICANT CHANGE UP (ref 15–150)
GLUCOSE BLDC GLUCOMTR-MCNC: 119 MG/DL — HIGH (ref 70–99)
GLUCOSE BLDC GLUCOMTR-MCNC: 125 MG/DL — HIGH (ref 70–99)
GLUCOSE BLDC GLUCOMTR-MCNC: 133 MG/DL — HIGH (ref 70–99)
GLUCOSE BLDC GLUCOMTR-MCNC: 93 MG/DL — SIGNIFICANT CHANGE UP (ref 70–99)
GLUCOSE SERPL-MCNC: 85 MG/DL — SIGNIFICANT CHANGE UP (ref 70–99)
GLUCOSE SERPL-MCNC: 85 MG/DL — SIGNIFICANT CHANGE UP (ref 70–99)
HCT VFR BLD CALC: 29.9 % — LOW (ref 34.5–45)
HGB BLD-MCNC: 9.9 G/DL — LOW (ref 11.5–15.5)
IRON SATN MFR SERPL: 233 UG/DL — SIGNIFICANT CHANGE UP (ref 140–530)
IRON SATN MFR SERPL: 37 UG/DL — SIGNIFICANT CHANGE UP (ref 30–160)
MAGNESIUM SERPL-MCNC: 1.4 MG/DL — LOW (ref 1.6–2.6)
MCHC RBC-ENTMCNC: 30.2 PG — SIGNIFICANT CHANGE UP (ref 27–34)
MCHC RBC-ENTMCNC: 33.1 % — SIGNIFICANT CHANGE UP (ref 32–36)
MCV RBC AUTO: 91.2 FL — SIGNIFICANT CHANGE UP (ref 80–100)
NRBC # FLD: 0 — SIGNIFICANT CHANGE UP
PLATELET # BLD AUTO: 225 K/UL — SIGNIFICANT CHANGE UP (ref 150–400)
PMV BLD: 10.7 FL — SIGNIFICANT CHANGE UP (ref 7–13)
POTASSIUM SERPL-MCNC: 4.8 MMOL/L — SIGNIFICANT CHANGE UP (ref 3.5–5.3)
POTASSIUM SERPL-MCNC: 4.8 MMOL/L — SIGNIFICANT CHANGE UP (ref 3.5–5.3)
POTASSIUM SERPL-SCNC: 4.8 MMOL/L — SIGNIFICANT CHANGE UP (ref 3.5–5.3)
POTASSIUM SERPL-SCNC: 4.8 MMOL/L — SIGNIFICANT CHANGE UP (ref 3.5–5.3)
RBC # BLD: 3.28 M/UL — LOW (ref 3.8–5.2)
RBC # FLD: 15.4 % — HIGH (ref 10.3–14.5)
SODIUM SERPL-SCNC: 138 MMOL/L — SIGNIFICANT CHANGE UP (ref 135–145)
SODIUM SERPL-SCNC: 138 MMOL/L — SIGNIFICANT CHANGE UP (ref 135–145)
UIBC SERPL-MCNC: 195.8 UG/DL — SIGNIFICANT CHANGE UP (ref 110–370)
VIT B12 SERPL-MCNC: 150 PG/ML — LOW (ref 200–900)
WBC # BLD: 5.72 K/UL — SIGNIFICANT CHANGE UP (ref 3.8–10.5)
WBC # FLD AUTO: 5.72 K/UL — SIGNIFICANT CHANGE UP (ref 3.8–10.5)

## 2018-10-31 PROCEDURE — 93306 TTE W/DOPPLER COMPLETE: CPT | Mod: 26

## 2018-10-31 RX ORDER — PREGABALIN 225 MG/1
1000 CAPSULE ORAL DAILY
Qty: 0 | Refills: 0 | Status: COMPLETED | OUTPATIENT
Start: 2018-10-31 | End: 2018-11-01

## 2018-10-31 RX ORDER — MAGNESIUM SULFATE 500 MG/ML
1 VIAL (ML) INJECTION ONCE
Qty: 0 | Refills: 0 | Status: COMPLETED | OUTPATIENT
Start: 2018-10-31 | End: 2018-10-31

## 2018-10-31 RX ADMIN — INSULIN GLARGINE 10 UNIT(S): 100 INJECTION, SOLUTION SUBCUTANEOUS at 21:39

## 2018-10-31 RX ADMIN — INSULIN GLARGINE 10 UNIT(S): 100 INJECTION, SOLUTION SUBCUTANEOUS at 09:18

## 2018-10-31 RX ADMIN — Medication 1 TABLET(S): at 17:14

## 2018-10-31 RX ADMIN — LISINOPRIL 5 MILLIGRAM(S): 2.5 TABLET ORAL at 05:49

## 2018-10-31 RX ADMIN — Medication 81 MILLIGRAM(S): at 09:18

## 2018-10-31 RX ADMIN — PREGABALIN 1000 MICROGRAM(S): 225 CAPSULE ORAL at 13:25

## 2018-10-31 RX ADMIN — Medication 25 MILLIGRAM(S): at 05:49

## 2018-10-31 RX ADMIN — Medication 100 GRAM(S): at 09:18

## 2018-10-31 RX ADMIN — ATORVASTATIN CALCIUM 10 MILLIGRAM(S): 80 TABLET, FILM COATED ORAL at 21:32

## 2018-10-31 RX ADMIN — Medication 1 TABLET(S): at 05:49

## 2018-10-31 NOTE — PROGRESS NOTE ADULT - SUBJECTIVE AND OBJECTIVE BOX
Patient is a 89y old  Female who presents with a chief complaint of Unwitnessed fall (30 Oct 2018 16:11)    SUBJECTIVE / OVERNIGHT EVENTS: no overnight events    ROS:  Resp: No cough no sputum production  CVS: No chest pain no palpitations no orthopnea  GI: no N/V/D  : no dysuria, no hematuria  Neuro: no weakness no paresthesias  Heme: No petechiae no easy bruising  Msk: No joint pain no swelling  Skin: No rash no itching        MEDICATIONS  (STANDING):  aspirin enteric coated 81 milliGRAM(s) Oral daily  atorvastatin 10 milliGRAM(s) Oral at bedtime  calcium carbonate 1250 mG  + Vitamin D (OsCal 500 + D) 1 Tablet(s) Oral two times a day  dextrose 5%. 1000 milliLiter(s) (50 mL/Hr) IV Continuous <Continuous>  dextrose 50% Injectable 12.5 Gram(s) IV Push once  dextrose 50% Injectable 25 Gram(s) IV Push once  dextrose 50% Injectable 25 Gram(s) IV Push once  enoxaparin Injectable 40 milliGRAM(s) SubCutaneous every 24 hours  insulin glargine Injectable (LANTUS) 10 Unit(s) SubCutaneous every morning  insulin glargine Injectable (LANTUS) 10 Unit(s) SubCutaneous at bedtime  insulin lispro (HumaLOG) corrective regimen sliding scale   SubCutaneous three times a day before meals  insulin lispro (HumaLOG) corrective regimen sliding scale   SubCutaneous at bedtime  metoprolol tartrate 25 milliGRAM(s) Oral daily    MEDICATIONS  (PRN):  acetaminophen   Tablet .. 650 milliGRAM(s) Oral every 6 hours PRN Mild Pain (1 - 3), Moderate Pain (4 - 6), Severe Pain (7 - 10)  dextrose 40% Gel 15 Gram(s) Oral once PRN Blood Glucose LESS THAN 70 milliGRAM(s)/deciliter  docusate sodium 100 milliGRAM(s) Oral three times a day PRN Constipation  glucagon  Injectable 1 milliGRAM(s) IntraMuscular once PRN Glucose LESS THAN 70 milligrams/deciliter        CAPILLARY BLOOD GLUCOSE      POCT Blood Glucose.: 93 mg/dL (31 Oct 2018 08:39)  POCT Blood Glucose.: 110 mg/dL (30 Oct 2018 21:57)  POCT Blood Glucose.: 252 mg/dL (30 Oct 2018 17:02)  POCT Blood Glucose.: 120 mg/dL (30 Oct 2018 11:53)    I&O's Summary    30 Oct 2018 07:01  -  31 Oct 2018 07:00  --------------------------------------------------------  IN: 100 mL / OUT: 320 mL / NET: -220 mL    31 Oct 2018 07:01  -  31 Oct 2018 11:21  --------------------------------------------------------  IN: 400 mL / OUT: 0 mL / NET: 400 mL        Vital Signs Last 24 Hrs  T(C): 36.8 (31 Oct 2018 05:45), Max: 37.2 (30 Oct 2018 17:42)  T(F): 98.2 (31 Oct 2018 05:45), Max: 98.9 (30 Oct 2018 17:42)  HR: 72 (31 Oct 2018 05:45) (70 - 90)  BP: 107/46 (31 Oct 2018 05:45) (100/50 - 165/81)  BP(mean): --  RR: 18 (31 Oct 2018 05:45) (18 - 18)  SpO2: 99% (31 Oct 2018 05:45) (98% - 100%)    PHYSICAL EXAM: vital signs as above  in no apparent distress  HEENT: ESTER EOMI  Neck: Supple, no JVD, no thyromegaly  Lungs: no rhonchi, no wheeze, no crackles  CVS: S1 S2 soft ejection systolic murmur best heard at left sternal border   Abdomen: no tenderness, no organomegaly, BS present  Neuro: AO x 3 no focal weakness, no sensory abnormalities  Psych: appropriate affect  Skin: warm, dry  Ext: no cyanosis or clubbing, no edema  Msk: no joint swelling or deformities  Back: no CVA tenderness, no kyphosis/scoliosis    LABS:                        9.9    5.72  )-----------( 225      ( 31 Oct 2018 06:42 )             29.9     10-31    138  |  102  |  12  ----------------------------<  85  4.8   |  25  |  1.60<H>    Ca    8.5      31 Oct 2018 06:42  Mg     1.4     10-31                  All consultant(s) notes reviewed and care discussed with other providers    Contact Number, Dr Fernando [phone]

## 2018-10-31 NOTE — PROGRESS NOTE ADULT - SUBJECTIVE AND OBJECTIVE BOX
SUBJECTIVE: No CP or SOB.  Refusing Tele      MEDICATIONS  (STANDING):  aspirin enteric coated 81 milliGRAM(s) Oral daily  atorvastatin 10 milliGRAM(s) Oral at bedtime  calcium carbonate 1250 mG  + Vitamin D (OsCal 500 + D) 1 Tablet(s) Oral two times a day  cyanocobalamin Injectable 1000 MICROGram(s) IntraMuscular daily  dextrose 5%. 1000 milliLiter(s) (50 mL/Hr) IV Continuous <Continuous>  dextrose 50% Injectable 12.5 Gram(s) IV Push once  dextrose 50% Injectable 25 Gram(s) IV Push once  dextrose 50% Injectable 25 Gram(s) IV Push once  enoxaparin Injectable 40 milliGRAM(s) SubCutaneous every 24 hours  insulin glargine Injectable (LANTUS) 10 Unit(s) SubCutaneous every morning  insulin glargine Injectable (LANTUS) 10 Unit(s) SubCutaneous at bedtime  insulin lispro (HumaLOG) corrective regimen sliding scale   SubCutaneous three times a day before meals  insulin lispro (HumaLOG) corrective regimen sliding scale   SubCutaneous at bedtime  metoprolol tartrate 25 milliGRAM(s) Oral daily    MEDICATIONS  (PRN):  acetaminophen   Tablet .. 650 milliGRAM(s) Oral every 6 hours PRN Mild Pain (1 - 3), Moderate Pain (4 - 6), Severe Pain (7 - 10)  dextrose 40% Gel 15 Gram(s) Oral once PRN Blood Glucose LESS THAN 70 milliGRAM(s)/deciliter  docusate sodium 100 milliGRAM(s) Oral three times a day PRN Constipation  glucagon  Injectable 1 milliGRAM(s) IntraMuscular once PRN Glucose LESS THAN 70 milligrams/deciliter      LABS:                        9.9    5.72  )-----------( 225      ( 31 Oct 2018 06:42 )             29.9     138  |  102  |  12  ----------------------------<  85  4.8   |  25  |  1.60<H>    Ca    8.5      31 Oct 2018 06:42  Mg     1.4     10-31    CARDIAC MARKERS ( 29 Oct 2018 10:10 )  x     / x     / 217 u/L / 2.72 ng/mL / x      CARDIAC MARKERS ( 29 Oct 2018 08:15 )  x     / x     / 203 u/L / x     / x        PHYSICAL EXAM:  Vital Signs Last 24 Hrs  T(C): 36.8 (31 Oct 2018 05:45), Max: 37.2 (30 Oct 2018 17:42)  T(F): 98.2 (31 Oct 2018 05:45), Max: 98.9 (30 Oct 2018 17:42)  HR: 72 (31 Oct 2018 05:45) (70 - 90)  BP: 107/46 (31 Oct 2018 05:45) (100/50 - 165/81)  RR: 18 (31 Oct 2018 05:45) (18 - 18)  SpO2: 99% (31 Oct 2018 05:45) (98% - 100%)    Cardiovascular:  S1S2 RRR, No JVD  Respiratory: Lungs clear to auscultation, normal effort  Gastrointestinal: Abdomen soft, ND, NT, +BS  Skin: Warm, dry, intact. No rash.  Musculoskeletal: Normal ROM, normal strength  Ext: No C/C/E B/L LE    DIAGNOSTIC DATA  TELEMETRY: refusing    EKG: SR, First degree AV block, QRS 76 ms    < from: Transthoracic Echocardiogram (10.19.16 @ 10:03) >  CONCLUSIONS:  1. Mitral annular calcification, otherwise normal mitral  valve. Minimal mitral regurgitation.  2. Increased relative wall thickness with normal left  ventricular mass index, consistent with concentric left  ventricular remodeling.  3. Normal left ventricular systolic function. No segmental  wall motion abnormalities.  4. Mild diastolic dysfunction (Stage I).  5. Normal right ventricular size and systolic function.  ------------------------------------------------------------------------  Confirmed on  10/19/2016 - 11:32:32 by Zane Garcia M.D.    < end of copied text >    < from: Nuclear Stress Test-Pharmacologic (03.07.17 @ 10:30) >  IMPRESSIONS:  * No ECG evidence of ischemia  * Myocardial Perfusion SPECT results are abnormal.  * There is a medium sized, mild defect in anterior wall  that is partially reversible, suggestive of mild ischemia.  * The left ventricle was normal LV size.  * Post-stress resting myocardial perfusion gated SPECT  imaging was performed (LVEF > 70%;LVEDV = 47 ml.) and  showed normal wall motion.    *** Conclusions ***  There is SPECT evidence of mild ischemia during  Regadenoson  pharmacologic stress in the distribution of  LAD.  ------------------------------------------------------------------------  Confirmed on  3/7/2017 - 14:14:32 at  by Krissy Espitia MD  < end of copied text >    < from: CT Head No Cont (10.29.18 @ 09:51) >  IMPRESSION:    CT brain:    No acute intracranial hemorrhage or mass effect. No displaced calvarial   fracture.    CT cervical spine:    No acute fracture or traumatic subluxation. No prevertebral soft tissue   swelling. Degenerative changes.  < end of copied text >      < from: CT Abdomen and Pelvis w/ IV Cont (10.29.18 @ 09:55) >  IMPRESSION:     No CT evidence of acute vascular injury or solid organ injury in the   abdomen or pelvis.    Bilateral indeterminate renal lesions, stable since 01/26/2018, but   increased from 2014, concerning for renal neoplasms.  < end of copied text >    TROP T  34--32  --217    ASSESSMENT AND PLAN:    89 y/o female with a PMHx of arthritis, ?CKD, HTN, HLD and DM presents s/p unwitnessed fall.  She reports her legs gave out, denies LOC.  Reports frequent falls at home due to weakness.  Denies syncope.    --TTE noted from 2016, can check repeat as OP  --Check orthostatics  --replete lytes  --unable to obtain orthostatics  --PT eval  --She underwent NST 3/2017 revealing mild anterior wall ischemia, but does not appear she had any further invasive work up. Presumed cad was medically managed at that time given advanced age; given no anginal symptoms or clinical heart failure, would continue to medically manage; no repeat ischemic eval needed at this time

## 2018-11-01 LAB
BUN SERPL-MCNC: 15 MG/DL — SIGNIFICANT CHANGE UP (ref 7–23)
BUN SERPL-MCNC: 16 MG/DL — SIGNIFICANT CHANGE UP (ref 7–23)
CALCIUM SERPL-MCNC: 8.8 MG/DL — SIGNIFICANT CHANGE UP (ref 8.4–10.5)
CALCIUM SERPL-MCNC: 8.8 MG/DL — SIGNIFICANT CHANGE UP (ref 8.4–10.5)
CHLORIDE SERPL-SCNC: 97 MMOL/L — LOW (ref 98–107)
CHLORIDE SERPL-SCNC: 99 MMOL/L — SIGNIFICANT CHANGE UP (ref 98–107)
CO2 SERPL-SCNC: 21 MMOL/L — LOW (ref 22–31)
CO2 SERPL-SCNC: 24 MMOL/L — SIGNIFICANT CHANGE UP (ref 22–31)
CREAT SERPL-MCNC: 1.51 MG/DL — HIGH (ref 0.5–1.3)
CREAT SERPL-MCNC: 1.56 MG/DL — HIGH (ref 0.5–1.3)
GLUCOSE BLDC GLUCOMTR-MCNC: 102 MG/DL — HIGH (ref 70–99)
GLUCOSE BLDC GLUCOMTR-MCNC: 114 MG/DL — HIGH (ref 70–99)
GLUCOSE BLDC GLUCOMTR-MCNC: 130 MG/DL — HIGH (ref 70–99)
GLUCOSE BLDC GLUCOMTR-MCNC: 91 MG/DL — SIGNIFICANT CHANGE UP (ref 70–99)
GLUCOSE SERPL-MCNC: 153 MG/DL — HIGH (ref 70–99)
GLUCOSE SERPL-MCNC: 81 MG/DL — SIGNIFICANT CHANGE UP (ref 70–99)
HCT VFR BLD CALC: 33.4 % — LOW (ref 34.5–45)
HGB BLD-MCNC: 10.8 G/DL — LOW (ref 11.5–15.5)
MAGNESIUM SERPL-MCNC: 1.6 MG/DL — SIGNIFICANT CHANGE UP (ref 1.6–2.6)
MCHC RBC-ENTMCNC: 29.5 PG — SIGNIFICANT CHANGE UP (ref 27–34)
MCHC RBC-ENTMCNC: 32.3 % — SIGNIFICANT CHANGE UP (ref 32–36)
MCV RBC AUTO: 91.3 FL — SIGNIFICANT CHANGE UP (ref 80–100)
NRBC # FLD: 0 — SIGNIFICANT CHANGE UP
PLATELET # BLD AUTO: 264 K/UL — SIGNIFICANT CHANGE UP (ref 150–400)
PMV BLD: 10.7 FL — SIGNIFICANT CHANGE UP (ref 7–13)
POTASSIUM SERPL-MCNC: 5.5 MMOL/L — HIGH (ref 3.5–5.3)
POTASSIUM SERPL-MCNC: 5.8 MMOL/L — HIGH (ref 3.5–5.3)
POTASSIUM SERPL-SCNC: 5.5 MMOL/L — HIGH (ref 3.5–5.3)
POTASSIUM SERPL-SCNC: 5.8 MMOL/L — HIGH (ref 3.5–5.3)
RBC # BLD: 3.66 M/UL — LOW (ref 3.8–5.2)
RBC # FLD: 15 % — HIGH (ref 10.3–14.5)
SODIUM SERPL-SCNC: 135 MMOL/L — SIGNIFICANT CHANGE UP (ref 135–145)
SODIUM SERPL-SCNC: 135 MMOL/L — SIGNIFICANT CHANGE UP (ref 135–145)
WBC # BLD: 6.65 K/UL — SIGNIFICANT CHANGE UP (ref 3.8–10.5)
WBC # FLD AUTO: 6.65 K/UL — SIGNIFICANT CHANGE UP (ref 3.8–10.5)

## 2018-11-01 RX ORDER — MAGNESIUM SULFATE 500 MG/ML
1 VIAL (ML) INJECTION ONCE
Qty: 0 | Refills: 0 | Status: COMPLETED | OUTPATIENT
Start: 2018-11-01 | End: 2018-11-01

## 2018-11-01 RX ORDER — SODIUM POLYSTYRENE SULFONATE 4.1 MEQ/G
30 POWDER, FOR SUSPENSION ORAL ONCE
Qty: 0 | Refills: 0 | Status: COMPLETED | OUTPATIENT
Start: 2018-11-01 | End: 2018-11-01

## 2018-11-01 RX ADMIN — Medication 25 MILLIGRAM(S): at 06:57

## 2018-11-01 RX ADMIN — Medication 650 MILLIGRAM(S): at 09:33

## 2018-11-01 RX ADMIN — PREGABALIN 1000 MICROGRAM(S): 225 CAPSULE ORAL at 12:16

## 2018-11-01 RX ADMIN — INSULIN GLARGINE 10 UNIT(S): 100 INJECTION, SOLUTION SUBCUTANEOUS at 22:42

## 2018-11-01 RX ADMIN — Medication 650 MILLIGRAM(S): at 10:15

## 2018-11-01 RX ADMIN — Medication 100 GRAM(S): at 09:30

## 2018-11-01 RX ADMIN — Medication 1 TABLET(S): at 06:57

## 2018-11-01 RX ADMIN — Medication 1 TABLET(S): at 18:12

## 2018-11-01 RX ADMIN — INSULIN GLARGINE 10 UNIT(S): 100 INJECTION, SOLUTION SUBCUTANEOUS at 08:35

## 2018-11-01 RX ADMIN — Medication 81 MILLIGRAM(S): at 12:16

## 2018-11-01 RX ADMIN — SODIUM POLYSTYRENE SULFONATE 30 GRAM(S): 4.1 POWDER, FOR SUSPENSION ORAL at 13:56

## 2018-11-01 NOTE — PROGRESS NOTE ADULT - SUBJECTIVE AND OBJECTIVE BOX
Patient is a 89y old  Female who presents with a chief complaint of Unwitnessed fall (01 Nov 2018 11:11)      SUBJECTIVE / OVERNIGHT EVENTS: no overnight events    ROS:  Resp: No cough no sputum production  CVS: No chest pain no palpitations no orthopnea  GI: no N/V/D  : no dysuria, no hematuria  Neuro: no weakness no paresthesias  Heme: No petechiae no easy bruising  Msk: No joint pain no swelling  Skin: No rash no itching        MEDICATIONS  (STANDING):  aspirin enteric coated 81 milliGRAM(s) Oral daily  atorvastatin 10 milliGRAM(s) Oral at bedtime  calcium carbonate 1250 mG  + Vitamin D (OsCal 500 + D) 1 Tablet(s) Oral two times a day  dextrose 5%. 1000 milliLiter(s) (50 mL/Hr) IV Continuous <Continuous>  dextrose 50% Injectable 12.5 Gram(s) IV Push once  dextrose 50% Injectable 25 Gram(s) IV Push once  dextrose 50% Injectable 25 Gram(s) IV Push once  enoxaparin Injectable 40 milliGRAM(s) SubCutaneous every 24 hours  insulin glargine Injectable (LANTUS) 10 Unit(s) SubCutaneous every morning  insulin glargine Injectable (LANTUS) 10 Unit(s) SubCutaneous at bedtime  insulin lispro (HumaLOG) corrective regimen sliding scale   SubCutaneous three times a day before meals  insulin lispro (HumaLOG) corrective regimen sliding scale   SubCutaneous at bedtime  metoprolol tartrate 25 milliGRAM(s) Oral daily    MEDICATIONS  (PRN):  acetaminophen   Tablet .. 650 milliGRAM(s) Oral every 6 hours PRN Mild Pain (1 - 3), Moderate Pain (4 - 6), Severe Pain (7 - 10)  dextrose 40% Gel 15 Gram(s) Oral once PRN Blood Glucose LESS THAN 70 milliGRAM(s)/deciliter  docusate sodium 100 milliGRAM(s) Oral three times a day PRN Constipation  glucagon  Injectable 1 milliGRAM(s) IntraMuscular once PRN Glucose LESS THAN 70 milligrams/deciliter        CAPILLARY BLOOD GLUCOSE      POCT Blood Glucose.: 114 mg/dL (01 Nov 2018 17:37)  POCT Blood Glucose.: 102 mg/dL (01 Nov 2018 12:40)  POCT Blood Glucose.: 91 mg/dL (01 Nov 2018 08:39)  POCT Blood Glucose.: 133 mg/dL (31 Oct 2018 21:35)    I&O's Summary    31 Oct 2018 07:01  -  01 Nov 2018 07:00  --------------------------------------------------------  IN: 600 mL / OUT: 1250 mL / NET: -650 mL        Vital Signs Last 24 Hrs  T(C): 36.6 (01 Nov 2018 14:36), Max: 37.3 (31 Oct 2018 21:29)  T(F): 97.8 (01 Nov 2018 14:36), Max: 99.2 (31 Oct 2018 21:29)  HR: 75 (01 Nov 2018 14:36) (75 - 80)  BP: 157/66 (01 Nov 2018 14:36) (105/50 - 157/66)  BP(mean): --  RR: 17 (01 Nov 2018 14:36) (17 - 18)  SpO2: 98% (01 Nov 2018 14:36) (98% - 100%)    PHYSICAL EXAM: vital signs as above  in no apparent distress  HEENT: ESTER EOMI  Neck: Supple, no JVD, no thyromegaly  Lungs: no rhonchi, no wheeze, no crackles  CVS: S1 S2 soft ejection systolic murmur best heard at left sternal border   Abdomen: no tenderness, no organomegaly, BS present  Neuro: AO x 3 no focal weakness, no sensory abnormalities  Psych: appropriate affect  Skin: warm, dry  Ext: no cyanosis or clubbing, no edema  Msk: no joint swelling or deformities  Back: no CVA tenderness, no kyphosis/scoliosis    LABS:                        10.8   6.65  )-----------( 264      ( 01 Nov 2018 06:46 )             33.4     11-01    135  |  97<L>  |  16  ----------------------------<  153<H>  5.5<H>   |  24  |  1.56<H>    Ca    8.8      01 Nov 2018 10:30  Mg     1.6     11-01                  All consultant(s) notes reviewed and care discussed with other providers    Contact Number, Dr Fernando [phone]

## 2018-11-01 NOTE — PROGRESS NOTE ADULT - SUBJECTIVE AND OBJECTIVE BOX
SUBJECTIVE: No CP or SOB.  Refusing Tele    MEDICATIONS  (STANDING):  aspirin enteric coated 81 milliGRAM(s) Oral daily  atorvastatin 10 milliGRAM(s) Oral at bedtime  calcium carbonate 1250 mG  + Vitamin D (OsCal 500 + D) 1 Tablet(s) Oral two times a day  cyanocobalamin Injectable 1000 MICROGram(s) IntraMuscular daily  enoxaparin Injectable 40 milliGRAM(s) SubCutaneous every 24 hours  insulin glargine Injectable (LANTUS) 10 Unit(s) SubCutaneous every morning  insulin glargine Injectable (LANTUS) 10 Unit(s) SubCutaneous at bedtime  insulin lispro (HumaLOG) corrective regimen sliding scale   SubCutaneous three times a day before meals  insulin lispro (HumaLOG) corrective regimen sliding scale   SubCutaneous at bedtime  metoprolol tartrate 25 milliGRAM(s) Oral daily      LABS:                        10.8   6.65  )-----------( 264      ( 01 Nov 2018 06:46 )             33.4     135  |  99  |  15  ----------------------------<  81  5.8<H>   |  21<L>  |  1.51<H>    Ca    8.8      01 Nov 2018 06:46  Mg     1.6     11-01    Creatinine Trend: 1.51<--, 1.60<--, 1.22<--, 1.30<--     PHYSICAL EXAM  Vital Signs Last 24 Hrs  T(C): 37.1 (01 Nov 2018 06:56), Max: 37.3 (31 Oct 2018 21:29)  T(F): 98.8 (01 Nov 2018 06:56), Max: 99.2 (31 Oct 2018 21:29)  HR: 76 (01 Nov 2018 06:56) (72 - 80)  BP: 105/50 (01 Nov 2018 06:56) (100/46 - 149/69)  RR: 18 (01 Nov 2018 06:56) (18 - 18)  SpO2: 100% (01 Nov 2018 06:56) (99% - 100%)    Cardiovascular:  S1S2 RRR, No JVD  Respiratory: Lungs clear to auscultation, normal effort  Gastrointestinal: Abdomen soft, ND, NT, +BS  Skin: Warm, dry, intact. No rash.  Musculoskeletal: Normal ROM, normal strength  Ext: No C/C/E B/L LE    DIAGNOSTIC DATA  TELEMETRY: refusing    EKG: SR, First degree AV block, QRS 76 ms    < from: Transthoracic Echocardiogram (10.19.16 @ 10:03) >  CONCLUSIONS:  1. Mitral annular calcification, otherwise normal mitral  valve. Minimal mitral regurgitation.  2. Increased relative wall thickness with normal left  ventricular mass index, consistent with concentric left  ventricular remodeling.  3. Normal left ventricular systolic function. No segmental  wall motion abnormalities.  4. Mild diastolic dysfunction (Stage I).  5. Normal right ventricular size and systolic function.  ------------------------------------------------------------------------  Confirmed on  10/19/2016 - 11:32:32 by Zane Garcia M.D.    < end of copied text >    < from: Nuclear Stress Test-Pharmacologic (03.07.17 @ 10:30) >  IMPRESSIONS:  * No ECG evidence of ischemia  * Myocardial Perfusion SPECT results are abnormal.  * There is a medium sized, mild defect in anterior wall  that is partially reversible, suggestive of mild ischemia.  * The left ventricle was normal LV size.  * Post-stress resting myocardial perfusion gated SPECT  imaging was performed (LVEF > 70%;LVEDV = 47 ml.) and  showed normal wall motion.    *** Conclusions ***  There is SPECT evidence of mild ischemia during  Regadenoson  pharmacologic stress in the distribution of  LAD.  ------------------------------------------------------------------------  Confirmed on  3/7/2017 - 14:14:32 at  by Krissy Espitia MD  < end of copied text >    < from: CT Head No Cont (10.29.18 @ 09:51) >  IMPRESSION:    CT brain:    No acute intracranial hemorrhage or mass effect. No displaced calvarial   fracture.    CT cervical spine:    No acute fracture or traumatic subluxation. No prevertebral soft tissue   swelling. Degenerative changes.  < end of copied text >      < from: CT Abdomen and Pelvis w/ IV Cont (10.29.18 @ 09:55) >  IMPRESSION:     No CT evidence of acute vascular injury or solid organ injury in the   abdomen or pelvis.    Bilateral indeterminate renal lesions, stable since 01/26/2018, but   increased from 2014, concerning for renal neoplasms.  < end of copied text >    TROP T  34--32  --217    ASSESSMENT AND PLAN:    89 y/o female with a PMHx of arthritis, ?CKD, HTN, HLD and DM presents s/p unwitnessed fall.  She reports her legs gave out, denies LOC.  Reports frequent falls at home due to weakness.  Denies syncope.    --TTE noted from 2016, can check repeat as OP  --Orthostatics negative x 1  --PT eval recc MIGUEL ÁNGEL-- DC planning  --She underwent NST 3/2017 revealing mild anterior wall ischemia, but does not appear she had any further invasive work up. Presumed cad was medically managed at that time given advanced age; given no anginal symptoms or clinical heart failure, would continue to medically manage; no repeat ischemic eval needed at this time SUBJECTIVE: No CP or SOB.  Refusing Tele    MEDICATIONS  (STANDING):  aspirin enteric coated 81 milliGRAM(s) Oral daily  atorvastatin 10 milliGRAM(s) Oral at bedtime  calcium carbonate 1250 mG  + Vitamin D (OsCal 500 + D) 1 Tablet(s) Oral two times a day  cyanocobalamin Injectable 1000 MICROGram(s) IntraMuscular daily  enoxaparin Injectable 40 milliGRAM(s) SubCutaneous every 24 hours  insulin glargine Injectable (LANTUS) 10 Unit(s) SubCutaneous every morning  insulin glargine Injectable (LANTUS) 10 Unit(s) SubCutaneous at bedtime  insulin lispro (HumaLOG) corrective regimen sliding scale   SubCutaneous three times a day before meals  insulin lispro (HumaLOG) corrective regimen sliding scale   SubCutaneous at bedtime  metoprolol tartrate 25 milliGRAM(s) Oral daily      LABS:                        10.8   6.65  )-----------( 264      ( 01 Nov 2018 06:46 )             33.4     135  |  99  |  15  ----------------------------<  81  5.8<H>   |  21<L>  |  1.51<H>    Ca    8.8      01 Nov 2018 06:46  Mg     1.6     11-01    Creatinine Trend: 1.51<--, 1.60<--, 1.22<--, 1.30<--     PHYSICAL EXAM  Vital Signs Last 24 Hrs  T(C): 37.1 (01 Nov 2018 06:56), Max: 37.3 (31 Oct 2018 21:29)  T(F): 98.8 (01 Nov 2018 06:56), Max: 99.2 (31 Oct 2018 21:29)  HR: 76 (01 Nov 2018 06:56) (72 - 80)  BP: 105/50 (01 Nov 2018 06:56) (100/46 - 149/69)  RR: 18 (01 Nov 2018 06:56) (18 - 18)  SpO2: 100% (01 Nov 2018 06:56) (99% - 100%)    Cardiovascular:  S1S2 RRR, No JVD  Respiratory: Lungs clear to auscultation, normal effort  Gastrointestinal: Abdomen soft, ND, NT, +BS  Skin: Warm, dry, intact. No rash.  Musculoskeletal: Normal ROM, normal strength  Ext: No C/C/E B/L LE    DIAGNOSTIC DATA  TELEMETRY: refusing    EKG: SR, First degree AV block, QRS 76 ms    < from: Transthoracic Echocardiogram (10.19.16 @ 10:03) >  CONCLUSIONS:  1. Mitral annular calcification, otherwise normal mitral  valve. Minimal mitral regurgitation.  2. Increased relative wall thickness with normal left  ventricular mass index, consistent with concentric left  ventricular remodeling.  3. Normal left ventricular systolic function. No segmental  wall motion abnormalities.  4. Mild diastolic dysfunction (Stage I).  5. Normal right ventricular size and systolic function.  ------------------------------------------------------------------------  Confirmed on  10/19/2016 - 11:32:32 by Zane Garcia M.D.    < end of copied text >    < from: Nuclear Stress Test-Pharmacologic (03.07.17 @ 10:30) >  IMPRESSIONS:  * No ECG evidence of ischemia  * Myocardial Perfusion SPECT results are abnormal.  * There is a medium sized, mild defect in anterior wall  that is partially reversible, suggestive of mild ischemia.  * The left ventricle was normal LV size.  * Post-stress resting myocardial perfusion gated SPECT  imaging was performed (LVEF > 70%;LVEDV = 47 ml.) and  showed normal wall motion.    *** Conclusions ***  There is SPECT evidence of mild ischemia during  Regadenoson  pharmacologic stress in the distribution of  LAD.  ------------------------------------------------------------------------  Confirmed on  3/7/2017 - 14:14:32 at  by Krissy Espitia MD  < end of copied text >    < from: CT Head No Cont (10.29.18 @ 09:51) >  IMPRESSION:    CT brain:    No acute intracranial hemorrhage or mass effect. No displaced calvarial   fracture.    CT cervical spine:    No acute fracture or traumatic subluxation. No prevertebral soft tissue   swelling. Degenerative changes.  < end of copied text >      < from: CT Abdomen and Pelvis w/ IV Cont (10.29.18 @ 09:55) >  IMPRESSION:     No CT evidence of acute vascular injury or solid organ injury in the   abdomen or pelvis.    Bilateral indeterminate renal lesions, stable since 01/26/2018, but   increased from 2014, concerning for renal neoplasms.  < end of copied text >    TROP T  34--32  --217    ASSESSMENT AND PLAN:    89 y/o female with a PMHx of arthritis, ?CKD, HTN, HLD and DM presents s/p unwitnessed fall.  She reports her legs gave out, denies LOC.  Reports frequent falls at home due to weakness.  Denies syncope.    --TTE noted   --Orthostatics negative x 1  --PT eval recc MIGUEL ÁNGEL-- DC planning  --She underwent NST 3/2017 revealing mild anterior wall ischemia, but does not appear she had any further invasive work up. Presumed cad was medically managed at that time given advanced age; given no anginal symptoms or clinical heart failure, would continue to medically manage; no repeat ischemic eval needed at this time

## 2018-11-02 ENCOUNTER — TRANSCRIPTION ENCOUNTER (OUTPATIENT)
Age: 83
End: 2018-11-02

## 2018-11-02 VITALS
TEMPERATURE: 98 F | SYSTOLIC BLOOD PRESSURE: 110 MMHG | RESPIRATION RATE: 16 BRPM | DIASTOLIC BLOOD PRESSURE: 56 MMHG | OXYGEN SATURATION: 100 % | HEART RATE: 81 BPM

## 2018-11-02 LAB
BUN SERPL-MCNC: 18 MG/DL — SIGNIFICANT CHANGE UP (ref 7–23)
CALCIUM SERPL-MCNC: 8.5 MG/DL — SIGNIFICANT CHANGE UP (ref 8.4–10.5)
CHLORIDE SERPL-SCNC: 98 MMOL/L — SIGNIFICANT CHANGE UP (ref 98–107)
CO2 SERPL-SCNC: 24 MMOL/L — SIGNIFICANT CHANGE UP (ref 22–31)
CREAT SERPL-MCNC: 1.62 MG/DL — HIGH (ref 0.5–1.3)
GLUCOSE BLDC GLUCOMTR-MCNC: 88 MG/DL — SIGNIFICANT CHANGE UP (ref 70–99)
GLUCOSE BLDC GLUCOMTR-MCNC: 91 MG/DL — SIGNIFICANT CHANGE UP (ref 70–99)
GLUCOSE SERPL-MCNC: 71 MG/DL — SIGNIFICANT CHANGE UP (ref 70–99)
MAGNESIUM SERPL-MCNC: 2 MG/DL — SIGNIFICANT CHANGE UP (ref 1.6–2.6)
POTASSIUM SERPL-MCNC: 4.3 MMOL/L — SIGNIFICANT CHANGE UP (ref 3.5–5.3)
POTASSIUM SERPL-SCNC: 4.3 MMOL/L — SIGNIFICANT CHANGE UP (ref 3.5–5.3)
SODIUM SERPL-SCNC: 134 MMOL/L — LOW (ref 135–145)

## 2018-11-02 RX ORDER — ASPIRIN/CALCIUM CARB/MAGNESIUM 324 MG
1 TABLET ORAL
Qty: 0 | Refills: 0 | COMMUNITY
Start: 2018-11-02

## 2018-11-02 RX ORDER — INSULIN DETEMIR 100/ML (3)
10 INSULIN PEN (ML) SUBCUTANEOUS
Qty: 0 | Refills: 0 | COMMUNITY

## 2018-11-02 RX ORDER — BENAZEPRIL HYDROCHLORIDE 40 MG/1
1 TABLET ORAL
Qty: 0 | Refills: 0 | COMMUNITY

## 2018-11-02 RX ORDER — DOCUSATE SODIUM 100 MG
1 CAPSULE ORAL
Qty: 0 | Refills: 0 | COMMUNITY
Start: 2018-11-02

## 2018-11-02 RX ORDER — PREGABALIN 225 MG/1
1000 CAPSULE ORAL ONCE
Qty: 0 | Refills: 0 | Status: COMPLETED | OUTPATIENT
Start: 2018-11-02 | End: 2018-11-02

## 2018-11-02 RX ADMIN — Medication 1 TABLET(S): at 05:15

## 2018-11-02 RX ADMIN — Medication 81 MILLIGRAM(S): at 11:42

## 2018-11-02 RX ADMIN — Medication 650 MILLIGRAM(S): at 08:15

## 2018-11-02 RX ADMIN — Medication 650 MILLIGRAM(S): at 07:21

## 2018-11-02 RX ADMIN — Medication 25 MILLIGRAM(S): at 05:15

## 2018-11-02 RX ADMIN — INSULIN GLARGINE 10 UNIT(S): 100 INJECTION, SOLUTION SUBCUTANEOUS at 09:41

## 2018-11-02 RX ADMIN — PREGABALIN 1000 MICROGRAM(S): 225 CAPSULE ORAL at 14:30

## 2018-11-02 NOTE — PROGRESS NOTE ADULT - SUBJECTIVE AND OBJECTIVE BOX
Patient is a 89y old  Female who presents with a chief complaint of Unwitnessed fall (02 Nov 2018 11:40)      SUBJECTIVE / OVERNIGHT EVENTS: no overnight events    ROS:  Resp: No cough no sputum production  CVS: No chest pain no palpitations no orthopnea  GI: no N/V/D  : no dysuria, no hematuria  Neuro: no weakness no paresthesias  Heme: No petechiae no easy bruising  Msk: No joint pain no swelling  Skin: No rash no itching        MEDICATIONS  (STANDING):  aspirin enteric coated 81 milliGRAM(s) Oral daily  atorvastatin 10 milliGRAM(s) Oral at bedtime  calcium carbonate 1250 mG  + Vitamin D (OsCal 500 + D) 1 Tablet(s) Oral two times a day  dextrose 5%. 1000 milliLiter(s) (50 mL/Hr) IV Continuous <Continuous>  dextrose 50% Injectable 12.5 Gram(s) IV Push once  dextrose 50% Injectable 25 Gram(s) IV Push once  dextrose 50% Injectable 25 Gram(s) IV Push once  enoxaparin Injectable 40 milliGRAM(s) SubCutaneous every 24 hours  insulin glargine Injectable (LANTUS) 10 Unit(s) SubCutaneous every morning  insulin glargine Injectable (LANTUS) 10 Unit(s) SubCutaneous at bedtime  insulin lispro (HumaLOG) corrective regimen sliding scale   SubCutaneous three times a day before meals  insulin lispro (HumaLOG) corrective regimen sliding scale   SubCutaneous at bedtime  metoprolol tartrate 25 milliGRAM(s) Oral daily    MEDICATIONS  (PRN):  acetaminophen   Tablet .. 650 milliGRAM(s) Oral every 6 hours PRN Mild Pain (1 - 3), Moderate Pain (4 - 6), Severe Pain (7 - 10)  dextrose 40% Gel 15 Gram(s) Oral once PRN Blood Glucose LESS THAN 70 milliGRAM(s)/deciliter  docusate sodium 100 milliGRAM(s) Oral three times a day PRN Constipation  glucagon  Injectable 1 milliGRAM(s) IntraMuscular once PRN Glucose LESS THAN 70 milligrams/deciliter        CAPILLARY BLOOD GLUCOSE      POCT Blood Glucose.: 88 mg/dL (02 Nov 2018 08:43)  POCT Blood Glucose.: 130 mg/dL (01 Nov 2018 22:23)  POCT Blood Glucose.: 114 mg/dL (01 Nov 2018 17:37)  POCT Blood Glucose.: 102 mg/dL (01 Nov 2018 12:40)    I&O's Summary    01 Nov 2018 07:01  -  02 Nov 2018 07:00  --------------------------------------------------------  IN: 100 mL / OUT: 0 mL / NET: 100 mL        Vital Signs Last 24 Hrs  T(C): 36.7 (02 Nov 2018 05:14), Max: 36.7 (02 Nov 2018 05:14)  T(F): 98 (02 Nov 2018 05:14), Max: 98 (02 Nov 2018 05:14)  HR: 76 (02 Nov 2018 05:14) (70 - 76)  BP: 119/55 (02 Nov 2018 05:14) (114/50 - 157/66)  BP(mean): --  RR: 18 (02 Nov 2018 05:14) (17 - 18)  SpO2: 100% (02 Nov 2018 05:14) (98% - 100%)    PHYSICAL EXAM: vital signs as above  in no apparent distress  HEENT: ESTER EOMI  Neck: Supple, no JVD, no thyromegaly  Lungs: no rhonchi, no wheeze, no crackles  CVS: S1 S2 soft ejection systolic murmur best heard at left sternal border   Abdomen: no tenderness, no organomegaly, BS present  Neuro: AO x 3 no focal weakness, no sensory abnormalities  Psych: appropriate affect  Skin: warm, dry  Ext: no cyanosis or clubbing, no edema  Msk: no joint swelling or deformities  Back: no CVA tenderness, no kyphosis/scoliosis    LABS:                        10.8   6.65  )-----------( 264      ( 01 Nov 2018 06:46 )             33.4     11-02    134<L>  |  98  |  18  ----------------------------<  71  4.3   |  24  |  1.62<H>    Ca    8.5      02 Nov 2018 06:35  Mg     2.0     11-02                  All consultant(s) notes reviewed and care discussed with other providers    Contact Number, Dr Fernando [phone]

## 2018-11-02 NOTE — PROGRESS NOTE ADULT - SUBJECTIVE AND OBJECTIVE BOX
Refusing Tele. NO anginal symptoms           MEDICATIONS  (STANDING):  aspirin enteric coated 81 milliGRAM(s) Oral daily  atorvastatin 10 milliGRAM(s) Oral at bedtime  calcium carbonate 1250 mG  + Vitamin D (OsCal 500 + D) 1 Tablet(s) Oral two times a day  dextrose 5%. 1000 milliLiter(s) (50 mL/Hr) IV Continuous <Continuous>  dextrose 50% Injectable 12.5 Gram(s) IV Push once  dextrose 50% Injectable 25 Gram(s) IV Push once  dextrose 50% Injectable 25 Gram(s) IV Push once  enoxaparin Injectable 40 milliGRAM(s) SubCutaneous every 24 hours  insulin glargine Injectable (LANTUS) 10 Unit(s) SubCutaneous every morning  insulin glargine Injectable (LANTUS) 10 Unit(s) SubCutaneous at bedtime  insulin lispro (HumaLOG) corrective regimen sliding scale   SubCutaneous three times a day before meals  insulin lispro (HumaLOG) corrective regimen sliding scale   SubCutaneous at bedtime  metoprolol tartrate 25 milliGRAM(s) Oral daily    MEDICATIONS  (PRN):  acetaminophen   Tablet .. 650 milliGRAM(s) Oral every 6 hours PRN Mild Pain (1 - 3), Moderate Pain (4 - 6), Severe Pain (7 - 10)  dextrose 40% Gel 15 Gram(s) Oral once PRN Blood Glucose LESS THAN 70 milliGRAM(s)/deciliter  docusate sodium 100 milliGRAM(s) Oral three times a day PRN Constipation  glucagon  Injectable 1 milliGRAM(s) IntraMuscular once PRN Glucose LESS THAN 70 milligrams/deciliter      LABS:                        10.8   6.65  )-----------( 264      ( 01 Nov 2018 06:46 )             33.4     Hemoglobin: 10.8 g/dL (11-01 @ 06:46)  Hemoglobin: 9.9 g/dL (10-31 @ 06:42)  Hemoglobin: 10.1 g/dL (10-30 @ 06:17)  Hemoglobin: 10.6 g/dL (10-29 @ 08:15)    11-02    134<L>  |  98  |  18  ----------------------------<  71  4.3   |  24  |  1.62<H>    Ca    8.5      02 Nov 2018 06:35  Mg     2.0     11-02      Creatinine Trend: 1.62<--, 1.56<--, 1.51<--, 1.60<--, 1.22<--, 1.30<--           PHYSICAL EXAM  Vital Signs Last 24 Hrs  T(C): 36.7 (02 Nov 2018 05:14), Max: 36.7 (02 Nov 2018 05:14)  T(F): 98 (02 Nov 2018 05:14), Max: 98 (02 Nov 2018 05:14)  HR: 76 (02 Nov 2018 05:14) (70 - 76)  BP: 119/55 (02 Nov 2018 05:14) (114/50 - 157/66)  BP(mean): --  RR: 18 (02 Nov 2018 05:14) (17 - 18)  SpO2: 100% (02 Nov 2018 05:14) (98% - 100%)      Cardiovascular:  S1S2 RRR, No JVD  Respiratory: Lungs clear to auscultation, normal effort  Gastrointestinal: Abdomen soft, ND, NT, +BS  Skin: Warm, dry, intact. No rash.  Musculoskeletal: Normal ROM, normal strength  Ext: No C/C/E B/L LE    DIAGNOSTIC DATA  TELEMETRY: refusing    EKG: SR, First degree AV block, QRS 76 ms    < from: Transthoracic Echocardiogram (10.19.16 @ 10:03) >  CONCLUSIONS:  1. Mitral annular calcification, otherwise normal mitral  valve. Minimal mitral regurgitation.  2. Increased relative wall thickness with normal left  ventricular mass index, consistent with concentric left  ventricular remodeling.  3. Normal left ventricular systolic function. No segmental  wall motion abnormalities.  4. Mild diastolic dysfunction (Stage I).  5. Normal right ventricular size and systolic function.  ------------------------------------------------------------------------  Confirmed on  10/19/2016 - 11:32:32 by Zane Garcia M.D.    < end of copied text >    < from: Nuclear Stress Test-Pharmacologic (03.07.17 @ 10:30) >  IMPRESSIONS:  * No ECG evidence of ischemia  * Myocardial Perfusion SPECT results are abnormal.  * There is a medium sized, mild defect in anterior wall  that is partially reversible, suggestive of mild ischemia.  * The left ventricle was normal LV size.  * Post-stress resting myocardial perfusion gated SPECT  imaging was performed (LVEF > 70%;LVEDV = 47 ml.) and  showed normal wall motion.    *** Conclusions ***  There is SPECT evidence of mild ischemia during  Regadenoson  pharmacologic stress in the distribution of  LAD.  ------------------------------------------------------------------------  Confirmed on  3/7/2017 - 14:14:32 at  by Krissy Espitia MD  < end of copied text >    < from: CT Head No Cont (10.29.18 @ 09:51) >  IMPRESSION:    CT brain:    No acute intracranial hemorrhage or mass effect. No displaced calvarial   fracture.    CT cervical spine:    No acute fracture or traumatic subluxation. No prevertebral soft tissue   swelling. Degenerative changes.  < end of copied text >      < from: CT Abdomen and Pelvis w/ IV Cont (10.29.18 @ 09:55) >  IMPRESSION:     No CT evidence of acute vascular injury or solid organ injury in the   abdomen or pelvis.    Bilateral indeterminate renal lesions, stable since 01/26/2018, but   increased from 2014, concerning for renal neoplasms.  < end of copied text >    TROP T  34--32  --217    < from: Transthoracic Echocardiogram (10.31.18 @ 07:42) >  CONCLUSIONS:  1. Normal left ventricular internal dimensions and wall  thicknesses.  2. Normal left ventricular systolic function. No segmental  wall motion abnormalities.  3. Normal right ventricular size and function.  4. Normal tricuspid valve. Minimal tricuspid regurgitation.  *** Compared with echocardiogram of 10/19/2016, no  significant changes noted.    < end of copied text >      ASSESSMENT AND PLAN:    89 y/o female with a PMHx of arthritis, ?CKD, HTN, HLD and DM presents s/p unwitnessed fall.  She reports her legs gave out, denies LOC.  Reports frequent falls at home due to weakness.  Denies syncope. CT head negative for acute bleed/mass/CVA.     --TTE noted  with normal Bi-V function   --Orthostatics negative x 1  -- BMP noted - K improved; creatinine remains elevated at 1.6  -- CT a/p - Bilateral indeterminate renal lesions, stable since 01/26/2018, but  increased from 2014, concerning for renal neoplasms.                 - f[facility] medicine   [facility]  --She underwent NST 3/2017 revealing mild anterior wall ischemia, but does not appear she had any further invasive work up. Presumed cad was medically managed at that time given advanced age; given no anginal symptoms or clinical heart failure, would continue to medically manage with asa/statin/bb  --no further cardiac workup needed at this time  -- care per primary team

## 2018-11-02 NOTE — PROGRESS NOTE ADULT - PROBLEM SELECTOR PLAN 2
UA with large leuk esterase. Pt asymptomatic at this time.  urine culture negative no antibiotics  UTI ruled out
likely has baseline CKD stage 3  will continue to monitor in Subacute Rehab  hyperkalemia resolved  will continue to monitor as outpatient   discontinue ACE as she has an elevated creatinine as well as hyperkalemia  also she is 89 years old and likely will not progress in her CKD at this stage
resolving  will continue to monitor  discontinue Lisinopril  kayexelate for hyperkalemia
unclear etiology  ? drop in BP  will discontinue Lisinopril for now  trend creatinine daily

## 2018-11-02 NOTE — DISCHARGE NOTE ADULT - MEDICATION SUMMARY - MEDICATIONS TO CHANGE
I will SWITCH the dose or number of times a day I take the medications listed below when I get home from the hospital:  None I will SWITCH the dose or number of times a day I take the medications listed below when I get home from the hospital:    Levemir 100 units/mL subcutaneous solution  -- 10 unit(s) subcutaneous 2 times a day

## 2018-11-02 NOTE — PROGRESS NOTE ADULT - PROBLEM SELECTOR PLAN 1
cardiology attending input noted  echocardiogram done normal LV function
cardiology attending input noted  echocardiogram done normal LV function  change metoprolol to 25 po ER qd
cardiology attending input noted  will obtain echocardiogram  patient states she fell without warning
cardiology attending input noted  echocardiogram done will check results

## 2018-11-02 NOTE — PROGRESS NOTE ADULT - PROBLEM SELECTOR PROBLEM 3
Normocytic normochromic anemia

## 2018-11-02 NOTE — PROGRESS NOTE ADULT - ASSESSMENT
89 y/o female with a PMHx of HTN, HLD, DM, and OA presents to ED S/P unwitnessed fall concerning for a syncopal event.

## 2018-11-02 NOTE — DISCHARGE NOTE ADULT - MEDICATION SUMMARY - MEDICATIONS TO TAKE
I will START or STAY ON the medications listed below when I get home from the hospital:    acetaminophen 325 mg oral tablet  -- 2 tab(s) by mouth every 6 hours, As needed, Severe Pain (7 - 10)  -- Indication: For As needed for pain    aspirin 81 mg oral delayed release tablet  -- 1 tab(s) by mouth once a day  -- Indication: For Cardioprotective    Levemir 100 units/mL subcutaneous solution  -- 10 unit(s) subcutaneous 2 times a day  -- Indication: For DM (diabetes mellitus)    lovastatin 20 mg oral tablet  -- 1 tab(s) by mouth once a day  -- Indication: For High cholesterol    metoprolol succinate 25 mg oral tablet, extended release  -- 1 tab(s) by mouth once a day  -- Indication: For High blood pressure    docusate sodium 100 mg oral capsule  -- 1 cap(s) by mouth 3 times a day, As needed, Constipation  -- Indication: For Stool Softener    calcium-vitamin D 500 mg-200 intl units oral tablet  -- 1 tab(s) by mouth 2 times a day  -- Indication: For Supplement    Vitamin D3 1000 intl units oral tablet  -- 1 tab(s) by mouth once a day  -- Indication: For Supplement I will START or STAY ON the medications listed below when I get home from the hospital:    acetaminophen 325 mg oral tablet  -- 2 tab(s) by mouth every 6 hours, As needed, Severe Pain (7 - 10)  -- Indication: For As needed for pain    aspirin 81 mg oral delayed release tablet  -- 1 tab(s) by mouth once a day  -- Indication: For Cardioprotective    Levemir 100 units/mL subcutaneous solution  -- 14 unit(s) subcutaneous once a day (at bedtime)  -- Indication: For DM (Diabetes Mellitus)    lovastatin 20 mg oral tablet  -- 1 tab(s) by mouth once a day  -- Indication: For High cholesterol    metoprolol succinate 25 mg oral tablet, extended release  -- 1 tab(s) by mouth once a day  -- Indication: For High blood pressure    docusate sodium 100 mg oral capsule  -- 1 cap(s) by mouth 3 times a day, As needed, Constipation  -- Indication: For Stool Softener    calcium-vitamin D 500 mg-200 intl units oral tablet  -- 1 tab(s) by mouth 2 times a day  -- Indication: For Supplement    Vitamin D3 1000 intl units oral tablet  -- 1 tab(s) by mouth once a day  -- Indication: For Supplement

## 2018-11-02 NOTE — DISCHARGE NOTE ADULT - PATIENT PORTAL LINK FT
You can access the [facility] Patient Portal, offered by [facility], by registering with the following website: [email or web address]

## 2018-11-02 NOTE — PROGRESS NOTE ADULT - PROBLEM SELECTOR PLAN 4
HbA1C 6.7  discontinue Actos   change Levemir to qd 14 units secondary to CKD  she is at risk for hypoglycemia with BID dosing of 10 units
HbA1C 6.7  not sure of the utility of oral hypoglycemic agent at her age
HbA1C 6.7  not sure of the utility of oral hypoglycemic agent at her age  will continue to monitor finger sticks with short acting insulin sliding scale
HbA1C 6.7  not sure of the utility of oral hypoglycemic agent at her age  will continue to monitor finger sticks with short acting insulin sliding scale

## 2018-11-02 NOTE — PROGRESS NOTE ADULT - PROBLEM SELECTOR PROBLEM 2
SVETA (acute kidney injury)
SVETA (acute kidney injury)
Urinary tract infection
SVETA (acute kidney injury)

## 2018-11-02 NOTE — PROGRESS NOTE ADULT - PROBLEM SELECTOR PLAN 3
will check work up  Hb stable  b12 low will replete
will check work up  Hb stable  b12 low will replete
will check work up  Hb stable  check B12 ferritin levels
will check work up  Hb stable  b12 low will replete

## 2018-11-02 NOTE — DISCHARGE NOTE ADULT - SECONDARY DIAGNOSIS.
HTN (hypertension) Hypercholesterolemia SVETA (acute kidney injury) OA (osteoarthritis) DM (Diabetes Mellitus)

## 2018-11-02 NOTE — DISCHARGE NOTE ADULT - MEDICATION SUMMARY - MEDICATIONS TO STOP TAKING
I will STOP taking the medications listed below when I get home from the hospital:    pioglitazone 15 mg oral tablet  -- 1 tab(s) by mouth once a day    Norvasc 5 mg oral tablet  -- 1 tab(s) by mouth once a day    metoprolol tartrate 25 mg oral tablet  -- 1 tab(s) by mouth once a day    benazepril 5 mg oral tablet  -- 1 tab(s) by mouth every other day

## 2018-11-02 NOTE — DISCHARGE NOTE ADULT - CARE PLAN
Principal Discharge DX:	Syncope and collapse  Goal:	Prevention of future episodes.  Assessment and plan of treatment:	Continue medications as prescribed.  Low salt, low fat, low sugar diet.  Continue physical therapy at rehab.  Follow up with your PCP within 1-2 weeks; call for appointment.  Secondary Diagnosis:	DM (Diabetes Mellitus)  Goal:	To maintain a normal blood glucose level and HgA1C level < 5.7 and to prevent diabetic complications such as uncontrolled diabetes, diabetic coma, blindness, renal failure, and amputations.  Assessment and plan of treatment:	Monitor finger sticks pre-meal and bedtime, low salt, fat and carbohydrate diet, minimize glucose intake.  Exercise daily for at least 30 minutes and weight loss.  Follow up with primary care physician and endocrinologist for routine Hemoglobin A1C checks and management.  Follow up with your ophthalmologist for routine yearly vision exams.  Secondary Diagnosis:	HTN (hypertension)  Goal:	To maintain a normal blood pressure to prevent heart attack, stroke and renal failure.  Assessment and plan of treatment:	Low sodium and fat diet, continue anti-hypertensive medications, and follow up with primary care physician.  Secondary Diagnosis:	Hypercholesterolemia  Goal:	To maintain normal cholesterol levels to prevent stroke, coronary artery disease, peripheral vascular disease and heart attacks.  Assessment and plan of treatment:	Low fat diet and continue current medications. Follow up with primary care physician and cardiologist for management.  Secondary Diagnosis:	SVETA (acute kidney injury)  Goal:	Close outpatient monitoring of your kidney function.  Assessment and plan of treatment:	Stop taking your benazapril.  Avoid nephrotoxic agents such as NSAIDs (advil/motrin/aleve/ibuprofen).  Follow up with your PCP within 1-2 weeks for repeat blood work to monitor your kidney function & electrolytes; call for appointment.  Secondary Diagnosis:	OA (osteoarthritis)  Goal:	Pain management.  Assessment and plan of treatment:	You may take tylenol as needed for pain.  Follow up with your PCP within 1-2 weeks.

## 2018-11-02 NOTE — PROGRESS NOTE ADULT - REASON FOR ADMISSION
Unwitnessed fall

## 2018-11-02 NOTE — PROGRESS NOTE ADULT - PROBLEM SELECTOR PLAN 5
continue home meds with hold parameters

## 2018-11-02 NOTE — PROGRESS NOTE ADULT - ATTENDING COMMENTS
Patient seen and examined.  Agree with above.   -Repeat TTE with normal LV function  -pt. with no anginal symptoms - would continue with medical management of presumed CAD  -no further cardiac workup needed at this time    Palak Curry MD
Patient seen and examined.  Agree with above.   No further inpatient cardiac workup needed at this time.     Palak Curry MD
Patient seen and examined.  Agree with above.   -TTE performed showing normal LV function  -no further cardiac workup needed at this time    Palak Curry MD
discussed with patient in detail, all questions answered  eventual discharge to subacute rehab when cleared by all services likely tomorrow
discharge to Subacute Rehab when bed available
discussed with patient in detail, all questions answered
replete hypomagnesemia  eventual discharge to subacute rehab when cleared by all services

## 2018-11-02 NOTE — DISCHARGE NOTE ADULT - HOSPITAL COURSE
HPI:  89 y/o female with a PMHx of arthritis, "kidney problems" (pt denies current or h/o HD, but cannot recall exact diagnosis), HTN, HLD and DM presents s/p unwitnessed fall yesterday around 18:00. Pt reports that she was attempting to walk to the restroom when she fell. Although pt participates in interview, history limited as pt states she "forgets things" and "cannot always remember things". Pt states that she did not land on her back and denies head injury, but admits that she is unsure which part of her body she landed on. She currently reports diffuse pain after falling that is worst in her R knee and L shoulder. Pt states that she was unable to stand up after the fall, but that she was able to move herself to a sitting position and move around on the floor. She notes that she remained on the floor until she was able to access her Med Alert button and contact EMS this morning, though she does not recall what time this was. Pt states that she is typically ambulatory with a walker at baseline, but that she does not always use it while she is at home. She denies urinary or fecal incontinence a/w the fall, but notes that she did urinate while she was stuck on the floor as she was unable to stand up to get to the restroom. Pt reports that she has fallen in the past, though she cannot recall the details about these episodes. Additionally, she reports that she has fainted in the past due to not eating as much as she should and states that she "didn't eat much" yesterday, but is unable to provide further details. Denies f/c, change in appetite from baseline, weight change, fatigue, generalized weakness, vision changes, eye pain, hearing changes, tinnitus, sore throat, nasal congestion, SOB, cough, orthopnea, ARNDT, palpitations, chest pain, LE edema, n/v/d, abd pain, changes in chronic constipation, hematochezia, urinary frequency, dysuria, hematuria, rash, headache, dizziness, numbness, tingling, extremity weakness, syncope/LOC or confusion. (29 Oct 2018 12:37)    On admission:   EKG: NSR at 78 bpm with 1st degree AV block  CE x2: Trop 34-->32, -->217  UA: Large LE, bacteria, WBC  UCx Neg x24  10/29 CXR: No focal consolidation or pleural effusion.  10/29 X-ray hips: No acute fracture or dislocation of the pelvis or either hip.  10/29 X-ray knees: No acute fracture or dislocation of either knee.  10/29 CT head: No acute intracranial hemorrhage or mass effect. No displaced calvarial fracture.  10/29 CT cervical spine: No acute fracture or traumatic subluxation. No prevertebral soft tissue swelling. Degenerative changes.  10/29 CT abdomen/pelvis: No CT evidence of acute vascular injury or solid organ injury in the abdomen or pelvis. Bilateral indeterminate renal lesions, stable since 01/26/2018, but   increased from 2014, concerning for renal neoplasms.  10/31 Echo: EF 75% 1. Normal left ventricular internal dimensions and wall thicknesses.2. Normal left ventricular systolic function. No segmental wall motion abnormalities.3. Normal right ventricular size and function. 4. Normal tricuspid valve. Minimal tricuspid regurgitation.*** Compared with echocardiogram of 10/19/2016, no significant changes noted.    Evaluated by cardiology: 89 y/o female with a PMHx of arthritis, ?CKD, HTN, HLD and DM presents s/p unwitnessed fall.  She reports her legs gave out, denies LOC.  Reports frequent falls at home due to weakness.  Denies syncope. CT head negative for acute bleed/mass/CVA.   --TTE noted  with normal Bi-V function --Orthostatics negative x 1-- BMP noted - hyperkalemia resolved; creatinine remains elevated at 1.6 (ACE discontinued) -- CT a/p - Bilateral indeterminate renal lesions, stable since 01/26/2018, but  increased from 2014, concerning for renal neoplasms. --PT eval rec MIGUEL ÁNGEL --She underwent NST 3/2017 revealing mild anterior wall ischemia, but does not appear she had any further invasive work up. Presumed cad was medically managed at that time given advanced age; given no anginal symptoms or clinical heart failure, would continue to medically manage with asa/statin/bb --no further cardiac workup needed at this time    Patient cleared for discharge to rehab.  Outpatient f/u with PCP within 1-2 weeks. HPI:  89 y/o female with a PMHx of arthritis, "kidney problems" (pt denies current or h/o HD, but cannot recall exact diagnosis), HTN, HLD and DM presents s/p unwitnessed fall yesterday around 18:00. Pt reports that she was attempting to walk to the restroom when she fell. Although pt participates in interview, history limited as pt states she "forgets things" and "cannot always remember things". Pt states that she did not land on her back and denies head injury, but admits that she is unsure which part of her body she landed on. She currently reports diffuse pain after falling that is worst in her R knee and L shoulder. Pt states that she was unable to stand up after the fall, but that she was able to move herself to a sitting position and move around on the floor. She notes that she remained on the floor until she was able to access her Med Alert button and contact EMS this morning, though she does not recall what time this was. Pt states that she is typically ambulatory with a walker at baseline, but that she does not always use it while she is at home. She denies urinary or fecal incontinence a/w the fall, but notes that she did urinate while she was stuck on the floor as she was unable to stand up to get to the restroom. Pt reports that she has fallen in the past, though she cannot recall the details about these episodes. Additionally, she reports that she has fainted in the past due to not eating as much as she should and states that she "didn't eat much" yesterday, but is unable to provide further details. Denies f/c, change in appetite from baseline, weight change, fatigue, generalized weakness, vision changes, eye pain, hearing changes, tinnitus, sore throat, nasal congestion, SOB, cough, orthopnea, ARNDT, palpitations, chest pain, LE edema, n/v/d, abd pain, changes in chronic constipation, hematochezia, urinary frequency, dysuria, hematuria, rash, headache, dizziness, numbness, tingling, extremity weakness, syncope/LOC or confusion. (29 Oct 2018 12:37)    On admission:   EKG: NSR at 78 bpm with 1st degree AV block  CE x2: Trop 34-->32, -->217  UA: Large LE, bacteria, WBC  UCx Neg x24  10/29 CXR: No focal consolidation or pleural effusion.  10/29 X-ray hips: No acute fracture or dislocation of the pelvis or either hip.  10/29 X-ray knees: No acute fracture or dislocation of either knee.  10/29 CT head: No acute intracranial hemorrhage or mass effect. No displaced calvarial fracture.  10/29 CT cervical spine: No acute fracture or traumatic subluxation. No prevertebral soft tissue swelling. Degenerative changes.  10/29 CT abdomen/pelvis: No CT evidence of acute vascular injury or solid organ injury in the abdomen or pelvis. Bilateral indeterminate renal lesions, stable since 01/26/2018, but   increased from 2014, concerning for renal neoplasms.  10/31 Echo: EF 75% 1. Normal left ventricular internal dimensions and wall thicknesses.2. Normal left ventricular systolic function. No segmental wall motion abnormalities.3. Normal right ventricular size and function. 4. Normal tricuspid valve. Minimal tricuspid regurgitation.*** Compared with echocardiogram of 10/19/2016, no significant changes noted.    Evaluated by cardiology: 89 y/o female with a PMHx of arthritis, ?CKD, HTN, HLD and DM presents s/p unwitnessed fall.  She reports her legs gave out, denies LOC.  Reports frequent falls at home due to weakness.  Denies syncope. CT head negative for acute bleed/mass/CVA.   --TTE noted  with normal Bi-V function --Orthostatics negative x 1-- BMP noted - hyperkalemia resolved; creatinine remains elevated at 1.6 (ACE discontinued) -- CT a/p - Bilateral indeterminate renal lesions, stable since 01/26/2018, but  increased from 2014, concerning for renal neoplasms. --PT eval rec MIGUEL ÁNGEL --She underwent NST 3/2017 revealing mild anterior wall ischemia, but does not appear she had any further invasive work up. Presumed cad was medically managed at that time given advanced age; given no anginal symptoms or clinical heart failure, would continue to medically manage with asa/statin/bb --no further cardiac workup needed at this time    Evaluated by medicine: 89 y/o female with a PMHx of HTN, HLD, DM, and OA presents to ED S/P unwitnessed fall concerning for a syncopal event.   -Syncope and collapse: cardiology attending input noted. echocardiogram done normal LV function. change metoprolol to 25 po ER qd.   -SVETA (acute kidney injury): likely has baseline CKD stage 3. will continue to monitor in [facility]. hyperkalemia resolved. will continue to monitor as outpatient. discontinue ACE as she has an elevated creatinine as well as hyperkalemia. also she is 89 years old and likely will not progress in her CKD at this stage.   -Normocytic normochromic anemia: will check work up. Hb stable. b12 low will replete.   -DM (Diabetes Mellitus): HbA1C 6.7. discontinue Actos. change Levemir to qd 14 units secondary to CKD. she is at risk for hypoglycemia with BID dosing of 10 units.   -Hypertension: continue home meds with hold parameters.     Patient cleared for discharge to rehab.  Outpatient f/u with PCP within 1-2 weeks.

## 2018-11-02 NOTE — DISCHARGE NOTE ADULT - PLAN OF CARE
To maintain a normal blood pressure to prevent heart attack, stroke and renal failure. Low sodium and fat diet, continue anti-hypertensive medications, and follow up with primary care physician. To maintain normal cholesterol levels to prevent stroke, coronary artery disease, peripheral vascular disease and heart attacks. Low fat diet and continue current medications. Follow up with primary care physician and cardiologist for management. Close outpatient monitoring of your kidney function. Stop taking your benazapril.  Avoid nephrotoxic agents such as NSAIDs (advil/motrin/aleve/ibuprofen).  Follow up with your PCP within 1-2 weeks for repeat blood work to monitor your kidney function & electrolytes; call for appointment. Pain management. You may take tylenol as needed for pain.  Follow up with your PCP within 1-2 weeks. Prevention of future episodes. Continue medications as prescribed.  Low salt, low fat, low sugar diet.  Continue physical therapy at rehab.  Follow up with your PCP within 1-2 weeks; call for appointment. To maintain a normal blood glucose level and HgA1C level < 5.7 and to prevent diabetic complications such as uncontrolled diabetes, diabetic coma, blindness, renal failure, and amputations. Monitor finger sticks pre-meal and bedtime, low salt, fat and carbohydrate diet, minimize glucose intake.  Exercise daily for at least 30 minutes and weight loss.  Follow up with primary care physician and endocrinologist for routine Hemoglobin A1C checks and management.  Follow up with your ophthalmologist for routine yearly vision exams.

## 2018-11-19 DIAGNOSIS — Z71.89 OTHER SPECIFIED COUNSELING: ICD-10-CM

## 2019-03-17 NOTE — ED ADULT NURSE NOTE - PAIN RATING/NUMBER SCALE (0-10): ACTIVITY
Subjective





- Date & Time of Evaluation


Date of Evaluation: 03/17/19


Time of Evaluation: 14:01





- Subjective


Subjective: 





Nephrology Consultation Note





Assessment: stable


Acute renal failure/acute kidney injury related to multifactorial including 

sepsis required dialysis: RESOLVED


Diabetes mellitus and history of hypertension history of CVA.


7 gram proteinuria on dipstick


Hypokalemia, Hypomagnesemia





Plan


Hypertension control with meds as ordered. Maintain hemodynamics stable. Avoid 

hypotension. Patient not on ACEI/ARB due to recent SHANITA. will add losartan 50 

mg/d as BP elevated as well 


Monitor Input/Output, daily weights and renal function with basic metabolic 

panel


supplement lytes as needed


repeat UA, urine pr/cr and alb/cr





Dose meds/antibiotics for GFR >60


Glycemic control


Further work up for as per primary team





Thanks for allowing me to participate in care of your patient. Will follow 

patient with you. Please call if any Qs.


Dr Stevo Sanders


Office: [phone] Cell: [phone] Fax: [phone]





Subjective: Noted events overnight. Patients feels better. no urine complaints. 

SOB better





Physical Examination:      


General Appearance: comfortable no acute distress


Vitals reviewed and noted as below


Head; Atraumatic, normocephalic


Neck; supple no lymphadenopathy, no thyromegaly or bruit


Lungs: Normal respiratory rate/effort. Breath sounds bilateral reduced at bases


Heart: Normal rate. s1s2 normal. No rub or gallop. 


Extremities: no edema. No varicose veins


Neurological: Patient is awake alert follow commands. Rt hand contractures


Skin: Warm and dry. Normal turgor. No rash. Palpitation: Normal elasticity for 

age


Abdomen: Abdomen is soft. Bowel sounds +. There is no abdominal tenderness, no 

guarding/rigidity no organomegaly


Psych: limite dinsight. normal affect


MSK: no joint tenderness or swelling. Digits and nails normal, no deformity


: kidney or bladder not palpable





Labs/imaging reviewed.


Past medical history, past surgical history, family history, social history, 

allergy reviewed and noted as below


Family hx: no hx of CKD. Rest non-contributory 








Objective





- Vital Signs/Intake and Output


Vital Signs (last 24 hours): 


                                        











Temp Pulse Resp BP Pulse Ox


 


 97.9 F   68   18   136/79   96 


 


 03/17/19 11:55  03/17/19 11:55  03/17/19 11:55  03/17/19 11:55  03/17/19 11:55











- Medications


Medications: 


                               Current Medications





Acetaminophen (Tylenol 325mg Tab)  650 mg PO Q6 PRN


   PRN Reason: Temperature


   Last Admin: 03/10/19 08:51 Dose:  650 mg


Acetaminophen (Tylenol 325mg Tab)  650 mg PO Q6 PRN


   PRN Reason: Pain <5


   Last Admin: 03/16/19 05:36 Dose:  650 mg


Al Hydrox/Mg Hydrox/Simethicone (Maalox Plus 30 Ml)  30 ml PO Q6 PRN


   PRN Reason: Indigestion / Heartburn


Albuterol/Ipratropium (Duoneb 3 Mg/0.5 Mg (3 Ml) Ud)  3 ml INH RQ6 [facility]


   Last Admin: 03/17/19 13:05 Dose:  Not Given


Dimethicone (Proshield Plus Skin Protectant)  1 applic TOP Q8 [facility]


   Last Admin: 03/17/19 09:32 Dose:  1 applic


Ferrous Sulfate (Feosol)  325 mg PO TID [facility]


   Last Admin: 03/08/19 11:21 Dose:  Not Given


Furosemide (Lasix)  20 mg PO DAILY [facility]


   Last Admin: 03/17/19 09:31 Dose:  20 mg


Insulin Human Regular (Humulin R)  0 units SC ACCU-CHECK [facility]; Protocol


   Last Admin: 03/17/19 13:05 Dose:  Not Given


Levetiracetam (Keppra)  500 mg PO BID [facility]


   Last Admin: 03/17/19 09:29 Dose:  500 mg


Losartan Potassium (Cozaar)  50 mg PO DAILY [facility]


   Last Admin: 03/17/19 11:13 Dose:  50 mg


Magnesium Oxide (Mag-Ox)  400 mg PO BID [facility]


   Last Admin: 03/17/19 09:28 Dose:  400 mg


Metformin HCl (Glucophage)  1,000 mg PO BIDWM [facility]


   Last Admin: 03/17/19 09:27 Dose:  1,000 mg


Metoprolol Tartrate (Lopressor)  25 mg PO Q12 [facility]


   Last Admin: 03/17/19 09:32 Dose:  Not Given


Ondansetron HCl (Zofran Inj)  4 mg IVP Q6 PRN


   PRN Reason: Nausea/Vomiting


   Last Admin: 03/17/19 02:40 Dose:  4 mg


Potassium Chloride (K-Dur 20 Meq Er Tab)  20 meq PO BID [facility]


   Last Admin: 03/17/19 09:28 Dose:  20 meq


Sitagliptin Phosphate (Januvia)  100 mg PO DAILY [facility]


   Last Admin: 03/17/19 09:28 Dose:  100 mg


Vancomycin HCl (Vancocin (Oral/Rectal Use))  500 mg PO Q6 [facility]; Protocol


   Last Admin: 03/17/19 09:32 Dose:  500 mg











- Labs


Labs: 


                                        





                                 03/16/19 05:30 





                                 03/16/19 05:30 





                                        











PT  16.2 Seconds (9.8-13.1)  H  03/01/19  10:28    


 


INR  1.4   03/01/19  10:28    


 


APTT  35.6 Seconds (25.6-37.1)   03/01/19  10:28 3

## 2019-04-09 ENCOUNTER — INPATIENT (INPATIENT)
Facility: HOSPITAL | Age: 84
LOS: 1 days | Discharge: HOME HEALTH | End: 2019-04-11
Attending: INTERNAL MEDICINE | Admitting: INTERNAL MEDICINE
Payer: MEDICARE

## 2019-04-09 VITALS
OXYGEN SATURATION: 100 % | TEMPERATURE: 98 F | RESPIRATION RATE: 18 BRPM | SYSTOLIC BLOOD PRESSURE: 132 MMHG | DIASTOLIC BLOOD PRESSURE: 101 MMHG | HEART RATE: 64 BPM

## 2019-04-09 DIAGNOSIS — Z98.41 CATARACT EXTRACTION STATUS, RIGHT EYE: Chronic | ICD-10-CM

## 2019-04-09 DIAGNOSIS — Z98.890 OTHER SPECIFIED POSTPROCEDURAL STATES: Chronic | ICD-10-CM

## 2019-04-09 DIAGNOSIS — E83.42 HYPOMAGNESEMIA: ICD-10-CM

## 2019-04-09 PROBLEM — M19.90 UNSPECIFIED OSTEOARTHRITIS, UNSPECIFIED SITE: Chronic | Status: ACTIVE | Noted: 2018-10-29

## 2019-04-09 PROBLEM — E78.5 HYPERLIPIDEMIA, UNSPECIFIED: Chronic | Status: ACTIVE | Noted: 2018-10-29

## 2019-04-09 PROBLEM — I10 ESSENTIAL (PRIMARY) HYPERTENSION: Chronic | Status: ACTIVE | Noted: 2018-10-29

## 2019-04-09 PROBLEM — E11.9 TYPE 2 DIABETES MELLITUS WITHOUT COMPLICATIONS: Chronic | Status: ACTIVE | Noted: 2018-10-29

## 2019-04-09 LAB
ALBUMIN SERPL ELPH-MCNC: 4.1 G/DL — SIGNIFICANT CHANGE UP (ref 3.3–5)
ALP SERPL-CCNC: 87 U/L — SIGNIFICANT CHANGE UP (ref 40–120)
ALT FLD-CCNC: 8 U/L — SIGNIFICANT CHANGE UP (ref 4–33)
ANION GAP SERPL CALC-SCNC: 11 MMO/L — SIGNIFICANT CHANGE UP (ref 7–14)
APPEARANCE UR: CLEAR — SIGNIFICANT CHANGE UP
APTT BLD: 30.7 SEC — SIGNIFICANT CHANGE UP (ref 27.5–36.3)
AST SERPL-CCNC: 18 U/L — SIGNIFICANT CHANGE UP (ref 4–32)
BASOPHILS # BLD AUTO: 0.03 K/UL — SIGNIFICANT CHANGE UP (ref 0–0.2)
BASOPHILS NFR BLD AUTO: 0.6 % — SIGNIFICANT CHANGE UP (ref 0–2)
BILIRUB SERPL-MCNC: 0.7 MG/DL — SIGNIFICANT CHANGE UP (ref 0.2–1.2)
BILIRUB UR-MCNC: NEGATIVE — SIGNIFICANT CHANGE UP
BLOOD UR QL VISUAL: NEGATIVE — SIGNIFICANT CHANGE UP
BUN SERPL-MCNC: 14 MG/DL — SIGNIFICANT CHANGE UP (ref 7–23)
CALCIUM SERPL-MCNC: 9.5 MG/DL — SIGNIFICANT CHANGE UP (ref 8.4–10.5)
CHLORIDE SERPL-SCNC: 103 MMOL/L — SIGNIFICANT CHANGE UP (ref 98–107)
CO2 SERPL-SCNC: 25 MMOL/L — SIGNIFICANT CHANGE UP (ref 22–31)
COLOR SPEC: COLORLESS — SIGNIFICANT CHANGE UP
CREAT SERPL-MCNC: 1.19 MG/DL — SIGNIFICANT CHANGE UP (ref 0.5–1.3)
EOSINOPHIL # BLD AUTO: 0.12 K/UL — SIGNIFICANT CHANGE UP (ref 0–0.5)
EOSINOPHIL NFR BLD AUTO: 2.5 % — SIGNIFICANT CHANGE UP (ref 0–6)
GLUCOSE BLDC GLUCOMTR-MCNC: 278 MG/DL — HIGH (ref 70–99)
GLUCOSE SERPL-MCNC: 145 MG/DL — HIGH (ref 70–99)
GLUCOSE UR-MCNC: NEGATIVE — SIGNIFICANT CHANGE UP
HCT VFR BLD CALC: 33.3 % — LOW (ref 34.5–45)
HGB BLD-MCNC: 10.7 G/DL — LOW (ref 11.5–15.5)
IMM GRANULOCYTES NFR BLD AUTO: 0.6 % — SIGNIFICANT CHANGE UP (ref 0–1.5)
INR BLD: 0.97 — SIGNIFICANT CHANGE UP (ref 0.88–1.17)
KETONES UR-MCNC: NEGATIVE — SIGNIFICANT CHANGE UP
LEUKOCYTE ESTERASE UR-ACNC: NEGATIVE — SIGNIFICANT CHANGE UP
LYMPHOCYTES # BLD AUTO: 1.92 K/UL — SIGNIFICANT CHANGE UP (ref 1–3.3)
LYMPHOCYTES # BLD AUTO: 39.9 % — SIGNIFICANT CHANGE UP (ref 13–44)
MAGNESIUM SERPL-MCNC: 1 MG/DL — CRITICAL LOW (ref 1.6–2.6)
MCHC RBC-ENTMCNC: 28.9 PG — SIGNIFICANT CHANGE UP (ref 27–34)
MCHC RBC-ENTMCNC: 32.1 % — SIGNIFICANT CHANGE UP (ref 32–36)
MCV RBC AUTO: 90 FL — SIGNIFICANT CHANGE UP (ref 80–100)
MONOCYTES # BLD AUTO: 0.36 K/UL — SIGNIFICANT CHANGE UP (ref 0–0.9)
MONOCYTES NFR BLD AUTO: 7.5 % — SIGNIFICANT CHANGE UP (ref 2–14)
NEUTROPHILS # BLD AUTO: 2.35 K/UL — SIGNIFICANT CHANGE UP (ref 1.8–7.4)
NEUTROPHILS NFR BLD AUTO: 48.9 % — SIGNIFICANT CHANGE UP (ref 43–77)
NITRITE UR-MCNC: NEGATIVE — SIGNIFICANT CHANGE UP
NRBC # FLD: 0 K/UL — SIGNIFICANT CHANGE UP (ref 0–0)
NT-PROBNP SERPL-SCNC: 843.3 PG/ML — SIGNIFICANT CHANGE UP
PH UR: 6.5 — SIGNIFICANT CHANGE UP (ref 5–8)
PLATELET # BLD AUTO: 208 K/UL — SIGNIFICANT CHANGE UP (ref 150–400)
PMV BLD: 10.1 FL — SIGNIFICANT CHANGE UP (ref 7–13)
POTASSIUM SERPL-MCNC: 4.3 MMOL/L — SIGNIFICANT CHANGE UP (ref 3.5–5.3)
POTASSIUM SERPL-SCNC: 4.3 MMOL/L — SIGNIFICANT CHANGE UP (ref 3.5–5.3)
PROT SERPL-MCNC: 7.7 G/DL — SIGNIFICANT CHANGE UP (ref 6–8.3)
PROT UR-MCNC: 10 — SIGNIFICANT CHANGE UP
PROTHROM AB SERPL-ACNC: 11 SEC — SIGNIFICANT CHANGE UP (ref 9.8–13.1)
RBC # BLD: 3.7 M/UL — LOW (ref 3.8–5.2)
RBC # FLD: 14.9 % — HIGH (ref 10.3–14.5)
SODIUM SERPL-SCNC: 139 MMOL/L — SIGNIFICANT CHANGE UP (ref 135–145)
SP GR SPEC: 1.01 — SIGNIFICANT CHANGE UP (ref 1–1.04)
TROPONIN T, HIGH SENSITIVITY: 29 NG/L — SIGNIFICANT CHANGE UP (ref ?–14)
UROBILINOGEN FLD QL: NORMAL — SIGNIFICANT CHANGE UP
WBC # BLD: 4.81 K/UL — SIGNIFICANT CHANGE UP (ref 3.8–10.5)
WBC # FLD AUTO: 4.81 K/UL — SIGNIFICANT CHANGE UP (ref 3.8–10.5)

## 2019-04-09 PROCEDURE — 71045 X-RAY EXAM CHEST 1 VIEW: CPT | Mod: 26

## 2019-04-09 PROCEDURE — 93971 EXTREMITY STUDY: CPT | Mod: 26,LT

## 2019-04-09 PROCEDURE — 99223 1ST HOSP IP/OBS HIGH 75: CPT

## 2019-04-09 PROCEDURE — 93010 ELECTROCARDIOGRAM REPORT: CPT

## 2019-04-09 RX ORDER — ACETAMINOPHEN 500 MG
650 TABLET ORAL EVERY 6 HOURS
Qty: 0 | Refills: 0 | Status: DISCONTINUED | OUTPATIENT
Start: 2019-04-09 | End: 2019-04-11

## 2019-04-09 RX ORDER — DEXTROSE 50 % IN WATER 50 %
15 SYRINGE (ML) INTRAVENOUS ONCE
Qty: 0 | Refills: 0 | Status: DISCONTINUED | OUTPATIENT
Start: 2019-04-09 | End: 2019-04-11

## 2019-04-09 RX ORDER — INSULIN LISPRO 100/ML
VIAL (ML) SUBCUTANEOUS AT BEDTIME
Qty: 0 | Refills: 0 | Status: DISCONTINUED | OUTPATIENT
Start: 2019-04-09 | End: 2019-04-11

## 2019-04-09 RX ORDER — INSULIN LISPRO 100/ML
VIAL (ML) SUBCUTANEOUS
Qty: 0 | Refills: 0 | Status: DISCONTINUED | OUTPATIENT
Start: 2019-04-09 | End: 2019-04-11

## 2019-04-09 RX ORDER — DEXTROSE 50 % IN WATER 50 %
12.5 SYRINGE (ML) INTRAVENOUS ONCE
Qty: 0 | Refills: 0 | Status: DISCONTINUED | OUTPATIENT
Start: 2019-04-09 | End: 2019-04-11

## 2019-04-09 RX ORDER — DEXTROSE 50 % IN WATER 50 %
25 SYRINGE (ML) INTRAVENOUS ONCE
Qty: 0 | Refills: 0 | Status: DISCONTINUED | OUTPATIENT
Start: 2019-04-09 | End: 2019-04-11

## 2019-04-09 RX ORDER — MORPHINE SULFATE 50 MG/1
2 CAPSULE, EXTENDED RELEASE ORAL ONCE
Qty: 0 | Refills: 0 | Status: DISCONTINUED | OUTPATIENT
Start: 2019-04-09 | End: 2019-04-09

## 2019-04-09 RX ORDER — SODIUM CHLORIDE 9 MG/ML
1000 INJECTION, SOLUTION INTRAVENOUS
Qty: 0 | Refills: 0 | Status: DISCONTINUED | OUTPATIENT
Start: 2019-04-09 | End: 2019-04-11

## 2019-04-09 RX ORDER — MAGNESIUM OXIDE 400 MG ORAL TABLET 241.3 MG
400 TABLET ORAL
Qty: 0 | Refills: 0 | Status: COMPLETED | OUTPATIENT
Start: 2019-04-09 | End: 2019-04-10

## 2019-04-09 RX ORDER — GLUCAGON INJECTION, SOLUTION 0.5 MG/.1ML
1 INJECTION, SOLUTION SUBCUTANEOUS ONCE
Qty: 0 | Refills: 0 | Status: DISCONTINUED | OUTPATIENT
Start: 2019-04-09 | End: 2019-04-11

## 2019-04-09 RX ORDER — MAGNESIUM SULFATE 500 MG/ML
2 VIAL (ML) INJECTION ONCE
Qty: 0 | Refills: 0 | Status: COMPLETED | OUTPATIENT
Start: 2019-04-09 | End: 2019-04-09

## 2019-04-09 RX ADMIN — MORPHINE SULFATE 2 MILLIGRAM(S): 50 CAPSULE, EXTENDED RELEASE ORAL at 18:29

## 2019-04-09 RX ADMIN — Medication 50 GRAM(S): at 18:27

## 2019-04-09 RX ADMIN — MORPHINE SULFATE 2 MILLIGRAM(S): 50 CAPSULE, EXTENDED RELEASE ORAL at 16:48

## 2019-04-09 RX ADMIN — Medication 650 MILLIGRAM(S): at 23:26

## 2019-04-09 NOTE — ED PROVIDER NOTE - ATTENDING CONTRIBUTION TO CARE
DR. SANDERS, ATTENDING MD-  I performed a face to face bedside interview with patient regarding history of present illness, review of symptoms and past medical history. I completed an independent physical exam.  I have discussed patient's plan of care with the resident.   Documentation as above in the note.    89 y/o female h/o htn, hld, dm here with b/l le pain and swelling x1 wk.  Lives alone in basement apt.  States gradually worsening pain now unable to ambulate.  No prior h/o such sx.  Denies f/c, ha, neck stiffness, cp, sob, cough, abd pain, n/v/d, dysuria, rash.  Afebrile, vs wnl, grimacing, poor insp effort otherwise ctabil, s1s2 rrr no m/r/g, abd soft non ttp no r/g, no cva tenderness b/l, b/l le mild swelling, ttp L>R, distally nv intact.  DVT vs neuropathy vs hypoalbuminemia.  Obtain cbc, bmp, ua, dvt studies, give pain med, will need admission for likely PT, further care, and evaluation.

## 2019-04-09 NOTE — H&P ADULT - NSHPPHYSICALEXAM_GEN_ALL_CORE
Vital Signs Last 24 Hrs  T(C): 36.8 (09 Apr 2019 20:36), Max: 36.8 (09 Apr 2019 20:36)  T(F): 98.3 (09 Apr 2019 20:36), Max: 98.3 (09 Apr 2019 20:36)  HR: 84 (09 Apr 2019 20:36) (64 - 84)  BP: 147/56 (09 Apr 2019 20:36) (132/101 - 149/66)  BP(mean): --  RR: 18 (09 Apr 2019 20:36) (16 - 18)  SpO2: 100% (09 Apr 2019 20:36) (100% - 100%)    GENERAL: No acute distress, well-developed  ENT: EOMI, PERRLA, conjunctiva and sclera clear, Neck supple, No JVD, moist mucosa  CHEST/LUNG: Clear to auscultation bilaterally; No wheeze, equal breath sounds bilaterally   BACK: No spinal tenderness  HEART: Regular rate and rhythm; No murmurs, rubs, or gallops  ABDOMEN: Soft, Nontender, Nondistended; Bowel sounds present  EXTREMITIES: b/l foot nonpitting edema vs subcutaneous fat, some TTP on palpation of both feet but no erythema or warmth, no ulcers/wounds noted  PSYCH: Nl behavior, nl affect  NEUROLOGY: AAOx3, non-focal  SKIN: Normal color, No rashes or lesions

## 2019-04-09 NOTE — H&P ADULT - PROBLEM SELECTOR PLAN 8
- Patient unsure of her medications, will need to clarify in AM, will email hospital pharmacist to help in her medication reconciliation

## 2019-04-09 NOTE — ED ADULT NURSE NOTE - OBJECTIVE STATEMENT
Pt presenting to room 5 AxOx3 ambulatory at baseline with a cane, for c/o bilateral foot pain and mild bilateral foot swelling. PMH of DM- pt states she went to see her podiatrist today for symptoms, was sent here for 10/10 pain in feet. Pt presenting to room 5 with Pt presenting to room 5 AxOx3 ambulatory at baseline with a cane, for c/o bilateral foot pain and mild bilateral foot swelling. PMH of DM- pt states she went to see her podiatrist today for symptoms, was sent here for 10/10 pain in feet. Pt presenting to room 5 with even and unlabored breathing, positive ROM in upper extremities and limited ROM of bilateral toes. Pt has full sensation in lower extremities with skin warm to touch and presenting without redness or tenderness. Pt denies SOB, CP, dizziness, lightheadedness, urinary symptoms, or any other discomfort. Skin dry and intact with nonblanchable discoloration present on sacrum. IV established in LAC with 20g, labs drawn and sent, MD at bedside, will continue to monitor.

## 2019-04-09 NOTE — H&P ADULT - PROBLEM SELECTOR PROBLEM 6
Type 2 diabetes mellitus without complication, with long-term current use of insulin Medication management

## 2019-04-09 NOTE — H&P ADULT - NSHPSOCIALHISTORY_GEN_ALL_CORE
Lives alone, ambulates with a walker  Has a HHA for 4 hrs per day for 4 days a week (Monday, Wednesday, Friday, and Saturday)  Denies any tobacco, etoh, or illicit substance use

## 2019-04-09 NOTE — H&P ADULT - PROBLEM SELECTOR PLAN 9
- SCDs for DVT ppx  - Fall precautions - Patient unsure of her medications, will need to clarify in AM, will email hospital pharmacist to help in her medication reconciliation

## 2019-04-09 NOTE — H&P ADULT - NSHPREVIEWOFSYSTEMS_GEN_ALL_CORE
REVIEW OF SYSTEMS:    CONSTITUTIONAL: No weakness, fevers or chills  EYES: No visual changes or eye discharge  ENT: No rhinorrhea or sore throat  NECK: No pain or stiffness  RESPIRATORY: No cough, wheezing, hemoptysis; No shortness of breath  CARDIOVASCULAR: No chest pain or palpitations; No lower extremity edema  GASTROINTESTINAL: No abdominal or epigastric pain. No nausea, vomiting, or hematemesis; No diarrhea or constipation. No melena or hematochezia.  EXT: B/L feet pain and swelling, No back pain  GENITOURINARY: No dysuria, frequency or hematuria  NEUROLOGICAL: No numbness or weakness  SKIN: No itching, burning, rashes, or lesions

## 2019-04-09 NOTE — H&P ADULT - HISTORY OF PRESENT ILLNESS
This is a 89F with history of DM2, HLD, HTN, and OA who presents to the hospital with complaints of b/l feet pain and swelling. Patient said that these symptoms have been going on for a few weeks but cannot state exactly how long the pain has been present for. Said that the pain is a sharp pain in both feet and rates it as a 10/10 pain. Said that the pain comes and goes and has been causing her a lot of issues. She said that she has been unable to walk as a result of the pain. Denies any trauma prior to the onset of the pain. Has OA but denies any significant knee or hip pain. Has tried to take tylenol for ther pain but said that it did not help her. Said that she went to her PCP for eval of this pain earlier this month and was prescribed an abx (though she does not know exactly why she was given the abx) which she said has not helped the pain at all. She therefore came to the ED for further management. Of note, patient said that she has had a significant decrease in her appetite and has lost ~10 lbs though cannot state over how much time she has lost this weight. No other complaints.    On arrival to the ED, her vitals were T 97.6, P 64, /101, R 18, O2 sat 100% RA. Her lab showed anemia and significant hypomagnesemia. She had a CXR and a US DVT study of the L leg which were negative for any acute findings. She ws given Mg SO4 2g IVPB x1 and morphine 2mg IVP x1. She was admitted to medicine for further management. This is a 89F with history of DM2, HLD, HTN, CKD 3, and OA who presents to the hospital with complaints of b/l feet pain and swelling. Patient said that these symptoms have been going on for a few weeks but cannot state exactly how long the pain has been present for. Said that the pain is a sharp pain in both feet and rates it as a 10/10 pain. Said that the pain comes and goes and has been causing her a lot of issues. She said that she has been unable to walk as a result of the pain. Denies any trauma prior to the onset of the pain. Has OA but denies any significant knee or hip pain. Has tried to take tylenol for ther pain but said that it did not help her. Said that she went to her PCP for eval of this pain earlier this month and was prescribed an abx (though she does not know exactly why she was given the abx) which she said has not helped the pain at all. She therefore came to the ED for further management. Of note, patient said that she has had a significant decrease in her appetite and has lost ~10 lbs though cannot state over how much time she has lost this weight. No other complaints.    On arrival to the ED, her vitals were T 97.6, P 64, /101, R 18, O2 sat 100% RA. Her lab showed anemia and significant hypomagnesemia. She had a CXR and a US DVT study of the L leg which were negative for any acute findings. She ws given Mg SO4 2g IVPB x1 and morphine 2mg IVP x1. She was admitted to medicine for further management.

## 2019-04-09 NOTE — H&P ADULT - NSICDXPASTMEDICALHX_GEN_ALL_CORE_FT
PAST MEDICAL HISTORY:  DM (diabetes mellitus)     HLD (hyperlipidemia)     HTN (hypertension)     OA (osteoarthritis) PAST MEDICAL HISTORY:  DM (diabetes mellitus)     HLD (hyperlipidemia)     HTN (hypertension)     OA (osteoarthritis)     Stage III chronic kidney disease

## 2019-04-09 NOTE — H&P ADULT - PROBLEM SELECTOR PLAN 7
- Patient unsure of her medications, will need to clarify in AM, will email hospital pharmacist to help in her medication reconciliation - Patient unsure of her medications, will need to clarify in AM, will email hospital pharmacist to help in her medication reconciliation  - Will monitor on HISS for now

## 2019-04-09 NOTE — H&P ADULT - NSICDXPASTSURGICALHX_GEN_ALL_CORE_FT
PAST SURGICAL HISTORY:  H/O bilateral cataract extraction     H/O eye surgery L "retina" surgery    S/P appendectomy w/ ileocolic resection

## 2019-04-09 NOTE — H&P ADULT - ASSESSMENT
This is a 89F with history as above who presents to the hospital with complaints of b/l foot pain and swelling.

## 2019-04-09 NOTE — H&P ADULT - PROBLEM SELECTOR PLAN 5
- Patient unsure of her medications, will need to clarify in AM, will email hospital pharmacist to help in her medication reconciliation - Noted to have normocytic anemia, will check iron studies, ferritin level, retic count, B12, and folate in AM  - Possibly 2/2 CKD, monitor CBC in AM

## 2019-04-09 NOTE — H&P ADULT - PROBLEM SELECTOR PLAN 1
- Patient with b/l foot pain, likely multifactorial from OA, possible neuropathy given long standing DM, and deconditioning  - Would place on tylenol prn, cold compresses, trial of gabapentin incase pain is 2/2 neuropathy  - Will check b/l foot xrays though low concern for fracture as patient denies any trauma  - Fall precautions  - PT eval

## 2019-04-09 NOTE — H&P ADULT - PROBLEM SELECTOR PLAN 2
- Noted to have significant hypomagnesemia on lab work, possibly 2/2 poor PO intake as patient states she has not been eating well for a while  - Will c/w oral repletion for now as patient without any significant symptoms from her hypomagnesemia  - Will check an EKG to eval for any cardiac conduction abnormalities in setting of her hypomagnesemia - Noted to have significant hypomagnesemia on lab work, possibly 2/2 poor PO intake as patient states she has not been eating well for a while  - Will c/w oral repletion for now as patient without any significant symptoms from her hypomagnesemia  - Will check an EKG to eval for any cardiac conduction abnormalities in setting of her hypomagnesemia -> EKG shows NSR with narrow complex QRS, 1st degree AV block, frequent PVCs

## 2019-04-09 NOTE — ED ADULT NURSE REASSESSMENT NOTE - NS ED NURSE REASSESS COMMENT FT1
pt stable and comfortable, respirations equal and non labored, sating 100% room air, report tubed to station 57 to cssu

## 2019-04-09 NOTE — ED PROVIDER NOTE - PHYSICAL EXAMINATION
Marla Gann M.D.:   patient awake alert NAD .   LUNGS CTAB no wheeze no crackle.   CARD RRR no m/r/g.    Abdomen soft NT ND no rebound no guarding no CVA tenderness.   EXT WWP bl LE edema L>R with tenderness to palpation CV 2+DP/PT bilaterally.   neuro A&Ox3, moving all extremities.    skin warm and dry no rash  HEENT: dry mucous membranes, PERRL, EOMI

## 2019-04-09 NOTE — ED PROVIDER NOTE - PROGRESS NOTE DETAILS
Marla Gann M.D: labs and imaging only notable for low magneisum which is being repleted. pt still with significant LE pain and lives alone and is not a safe discharge. will admit. endorsed to dr chris bowser who is accepting.

## 2019-04-09 NOTE — ED PROVIDER NOTE - OBJECTIVE STATEMENT
Marla Gann M.D: 89F hx DM presenting with one week of worsening b/l LE edema associated with pain. notes worse in LLE as compared to right. no cp no sob no abd pain no f/c no cough no uri symptoms.

## 2019-04-09 NOTE — H&P ADULT - NSICDXFAMILYHX_GEN_ALL_CORE_FT
FAMILY HISTORY:  Child  Still living? Unknown  Family history of diabetes mellitus, Age at diagnosis: Age Unknown

## 2019-04-09 NOTE — H&P ADULT - PROBLEM SELECTOR PLAN 3
- Pt states she has not eaten well for the past few weeks to months and has lost ~10 lbs  - WIll obtain nutrition eval in AM, calorie counts

## 2019-04-09 NOTE — H&P ADULT - NSHPLABSRESULTS_GEN_ALL_CORE
LABS and ADDITIONAL STUDIES:                        10.7   4.81  )-----------( 208      ( 2019 16:31 )             33.3         139  |  103  |  14  ----------------------------<  145<H>  4.3   |  25  |  1.19    Ca    9.5      2019 06:23  Mg     1.0         TPro  7.7  /  Alb  4.1  /  TBili  0.7  /  DBili  x   /  AST  18  /  ALT  8   /  AlkPhos  87      LIVER FUNCTIONS - ( 2019 06:23 )  Alb: 4.1 g/dL / Pro: 7.7 g/dL / ALK PHOS: 87 u/L / ALT: 8 u/L / AST: 18 u/L / GGT: x           PT/INR - ( 2019 16:25 )   PT: 11.0 SEC;   INR: 0.97     PTT - ( 2019 16:25 )  PTT:30.7 SEC    Urinalysis Basic - ( 2019 18:40 )  Color: COLORLESS / Appearance: CLEAR / S.007 / pH: 6.5  Gluc: NEGATIVE / Ketone: NEGATIVE  / Bili: NEGATIVE / Urobili: NORMAL   Blood: NEGATIVE / Protein: 10 / Nitrite: NEGATIVE   Leuk Esterase: NEGATIVE / RBC: x / WBC x   Sq Epi: x / Non Sq Epi: x / Bacteria: x    < from: Xray Chest 1 View- PORTABLE-Urgent (19 @ 16:51) >    ******PRELIMINARY REPORT******            INTERPRETATION:  no emergent findings    < end of copied text >    < from: US Duplex Venous Lower Ext Ltd, Left (19 @ 17:34) >    IMPRESSION:     No evidence of left lower extremity deep venous thrombosis.    < end of copied text > LABS and ADDITIONAL STUDIES:                        10.7   4.81  )-----------( 208      ( 2019 16:31 )             33.3         139  |  103  |  14  ----------------------------<  145<H>  4.3   |  25  |  1.19    Ca    9.5      2019 06:23  Mg     1.0         TPro  7.7  /  Alb  4.1  /  TBili  0.7  /  DBili  x   /  AST  18  /  ALT  8   /  AlkPhos  87      LIVER FUNCTIONS - ( 2019 06:23 )  Alb: 4.1 g/dL / Pro: 7.7 g/dL / ALK PHOS: 87 u/L / ALT: 8 u/L / AST: 18 u/L / GGT: x           PT/INR - ( 2019 16:25 )   PT: 11.0 SEC;   INR: 0.97     PTT - ( 2019 16:25 )  PTT:30.7 SEC    Urinalysis Basic - ( 2019 18:40 )  Color: COLORLESS / Appearance: CLEAR / S.007 / pH: 6.5  Gluc: NEGATIVE / Ketone: NEGATIVE  / Bili: NEGATIVE / Urobili: NORMAL   Blood: NEGATIVE / Protein: 10 / Nitrite: NEGATIVE   Leuk Esterase: NEGATIVE / RBC: x / WBC x   Sq Epi: x / Non Sq Epi: x / Bacteria: x    Serum Pro-Brain Natriuretic Peptide (19 @ 06:23)    Serum Pro-Brain Natriuretic Peptide: 843.3  Troponin T, High Sensitivity (19 @ 06:23)    Troponin T, High Sensitivity: 29    < from: Xray Chest 1 View- PORTABLE-Urgent (19 @ 16:51) >  ******PRELIMINARY REPORT******        INTERPRETATION:  no emergent findings  < end of copied text >    < from: US Duplex Venous Lower Ext Ltd, Left (19 @ 17:34) >  IMPRESSION:   No evidence of left lower extremity deep venous thrombosis.  < end of copied text >

## 2019-04-09 NOTE — PATIENT PROFILE ADULT - FALL HARM RISK
fall hx, weakness/age(85 years old or older)/other fall hx, weakness/age(85 years old or older)/bones(Osteoporosis,prev fx,steroid use,metastatic bone ca)/other

## 2019-04-09 NOTE — ED PROVIDER NOTE - CLINICAL SUMMARY MEDICAL DECISION MAKING FREE TEXT BOX
89 y/o female wih h/o htn, hld, dm with b/l leg pain and swelling.  R/o dvt, eval for lyte abn.  Will need admission for further care and evaluation.

## 2019-04-09 NOTE — H&P ADULT - PROBLEM SELECTOR PLAN 4
- Noted to have an indeterminant trop of 29, prior leves in October 2018 were in low 30s, likely chronically elevated 2/2 CKD 3, will repeat level in AM to ensure stability  - Patient without any cardiac complaints currently

## 2019-04-09 NOTE — H&P ADULT - PROBLEM SELECTOR PLAN 6
- Patient unsure of her medications, will need to clarify in AM, will email hospital pharmacist to help in her medication reconciliation  - Will monitor on HISS for now - Patient unsure of her medications, will need to clarify in AM, will email hospital pharmacist to help in her medication reconciliation

## 2019-04-10 DIAGNOSIS — E83.42 HYPOMAGNESEMIA: ICD-10-CM

## 2019-04-10 DIAGNOSIS — M79.671 PAIN IN RIGHT FOOT: ICD-10-CM

## 2019-04-10 DIAGNOSIS — E78.5 HYPERLIPIDEMIA, UNSPECIFIED: ICD-10-CM

## 2019-04-10 DIAGNOSIS — R74.8 ABNORMAL LEVELS OF OTHER SERUM ENZYMES: ICD-10-CM

## 2019-04-10 DIAGNOSIS — Z29.9 ENCOUNTER FOR PROPHYLACTIC MEASURES, UNSPECIFIED: ICD-10-CM

## 2019-04-10 DIAGNOSIS — E11.9 TYPE 2 DIABETES MELLITUS WITHOUT COMPLICATIONS: ICD-10-CM

## 2019-04-10 DIAGNOSIS — Z79.899 OTHER LONG TERM (CURRENT) DRUG THERAPY: ICD-10-CM

## 2019-04-10 DIAGNOSIS — D64.9 ANEMIA, UNSPECIFIED: ICD-10-CM

## 2019-04-10 DIAGNOSIS — R62.7 ADULT FAILURE TO THRIVE: ICD-10-CM

## 2019-04-10 DIAGNOSIS — I10 ESSENTIAL (PRIMARY) HYPERTENSION: ICD-10-CM

## 2019-04-10 LAB
ALBUMIN SERPL ELPH-MCNC: 3.4 G/DL — SIGNIFICANT CHANGE UP (ref 3.3–5)
ALP SERPL-CCNC: 83 U/L — SIGNIFICANT CHANGE UP (ref 40–120)
ALT FLD-CCNC: 8 U/L — SIGNIFICANT CHANGE UP (ref 4–33)
ANION GAP SERPL CALC-SCNC: 10 MMO/L — SIGNIFICANT CHANGE UP (ref 7–14)
ANION GAP SERPL CALC-SCNC: 10 MMO/L — SIGNIFICANT CHANGE UP (ref 7–14)
AST SERPL-CCNC: 16 U/L — SIGNIFICANT CHANGE UP (ref 4–32)
BILIRUB SERPL-MCNC: 0.6 MG/DL — SIGNIFICANT CHANGE UP (ref 0.2–1.2)
BUN SERPL-MCNC: 10 MG/DL — SIGNIFICANT CHANGE UP (ref 7–23)
BUN SERPL-MCNC: 11 MG/DL — SIGNIFICANT CHANGE UP (ref 7–23)
CALCIUM SERPL-MCNC: 8.8 MG/DL — SIGNIFICANT CHANGE UP (ref 8.4–10.5)
CALCIUM SERPL-MCNC: 8.9 MG/DL — SIGNIFICANT CHANGE UP (ref 8.4–10.5)
CHLORIDE SERPL-SCNC: 106 MMOL/L — SIGNIFICANT CHANGE UP (ref 98–107)
CHLORIDE SERPL-SCNC: 108 MMOL/L — HIGH (ref 98–107)
CO2 SERPL-SCNC: 23 MMOL/L — SIGNIFICANT CHANGE UP (ref 22–31)
CO2 SERPL-SCNC: 25 MMOL/L — SIGNIFICANT CHANGE UP (ref 22–31)
CREAT SERPL-MCNC: 1.12 MG/DL — SIGNIFICANT CHANGE UP (ref 0.5–1.3)
CREAT SERPL-MCNC: 1.15 MG/DL — SIGNIFICANT CHANGE UP (ref 0.5–1.3)
FERRITIN SERPL-MCNC: 55.31 NG/ML — SIGNIFICANT CHANGE UP (ref 15–150)
FOLATE SERPL-MCNC: 8.6 NG/ML — SIGNIFICANT CHANGE UP (ref 4.7–20)
GLUCOSE BLDC GLUCOMTR-MCNC: 119 MG/DL — HIGH (ref 70–99)
GLUCOSE BLDC GLUCOMTR-MCNC: 130 MG/DL — HIGH (ref 70–99)
GLUCOSE BLDC GLUCOMTR-MCNC: 182 MG/DL — HIGH (ref 70–99)
GLUCOSE BLDC GLUCOMTR-MCNC: 195 MG/DL — HIGH (ref 70–99)
GLUCOSE SERPL-MCNC: 128 MG/DL — HIGH (ref 70–99)
GLUCOSE SERPL-MCNC: 153 MG/DL — HIGH (ref 70–99)
HBA1C BLD-MCNC: 7.8 % — HIGH (ref 4–5.6)
HCT VFR BLD CALC: 32.4 % — LOW (ref 34.5–45)
HGB BLD-MCNC: 10.2 G/DL — LOW (ref 11.5–15.5)
IRON SATN MFR SERPL: 295 UG/DL — SIGNIFICANT CHANGE UP (ref 140–530)
IRON SATN MFR SERPL: 41 UG/DL — SIGNIFICANT CHANGE UP (ref 30–160)
MAGNESIUM SERPL-MCNC: 1.3 MG/DL — LOW (ref 1.6–2.6)
MAGNESIUM SERPL-MCNC: 1.3 MG/DL — LOW (ref 1.6–2.6)
MCHC RBC-ENTMCNC: 28.9 PG — SIGNIFICANT CHANGE UP (ref 27–34)
MCHC RBC-ENTMCNC: 31.5 % — LOW (ref 32–36)
MCV RBC AUTO: 91.8 FL — SIGNIFICANT CHANGE UP (ref 80–100)
NRBC # FLD: 0 K/UL — SIGNIFICANT CHANGE UP (ref 0–0)
PHOSPHATE SERPL-MCNC: 2.9 MG/DL — SIGNIFICANT CHANGE UP (ref 2.5–4.5)
PHOSPHATE SERPL-MCNC: 3.1 MG/DL — SIGNIFICANT CHANGE UP (ref 2.5–4.5)
PLATELET # BLD AUTO: 197 K/UL — SIGNIFICANT CHANGE UP (ref 150–400)
PMV BLD: 10.4 FL — SIGNIFICANT CHANGE UP (ref 7–13)
POTASSIUM SERPL-MCNC: 3.8 MMOL/L — SIGNIFICANT CHANGE UP (ref 3.5–5.3)
POTASSIUM SERPL-MCNC: 3.9 MMOL/L — SIGNIFICANT CHANGE UP (ref 3.5–5.3)
POTASSIUM SERPL-SCNC: 3.8 MMOL/L — SIGNIFICANT CHANGE UP (ref 3.5–5.3)
POTASSIUM SERPL-SCNC: 3.9 MMOL/L — SIGNIFICANT CHANGE UP (ref 3.5–5.3)
PROT SERPL-MCNC: 6.6 G/DL — SIGNIFICANT CHANGE UP (ref 6–8.3)
RBC # BLD: 3.53 M/UL — LOW (ref 3.8–5.2)
RBC # FLD: 15.1 % — HIGH (ref 10.3–14.5)
RETICS #: 28 K/UL — SIGNIFICANT CHANGE UP (ref 25–125)
RETICS/RBC NFR: 0.8 % — SIGNIFICANT CHANGE UP (ref 0.5–2.5)
SODIUM SERPL-SCNC: 141 MMOL/L — SIGNIFICANT CHANGE UP (ref 135–145)
SODIUM SERPL-SCNC: 141 MMOL/L — SIGNIFICANT CHANGE UP (ref 135–145)
TROPONIN T, HIGH SENSITIVITY: 40 NG/L — SIGNIFICANT CHANGE UP (ref ?–14)
UIBC SERPL-MCNC: 254.3 UG/DL — SIGNIFICANT CHANGE UP (ref 110–370)
VIT B12 SERPL-MCNC: 150 PG/ML — LOW (ref 200–900)
WBC # BLD: 4.25 K/UL — SIGNIFICANT CHANGE UP (ref 3.8–10.5)
WBC # FLD AUTO: 4.25 K/UL — SIGNIFICANT CHANGE UP (ref 3.8–10.5)

## 2019-04-10 PROCEDURE — 93010 ELECTROCARDIOGRAM REPORT: CPT

## 2019-04-10 PROCEDURE — 73630 X-RAY EXAM OF FOOT: CPT | Mod: 26,50

## 2019-04-10 RX ORDER — CHOLECALCIFEROL (VITAMIN D3) 125 MCG
1 CAPSULE ORAL
Qty: 0 | Refills: 0 | COMMUNITY

## 2019-04-10 RX ORDER — METOPROLOL TARTRATE 50 MG
1 TABLET ORAL
Qty: 0 | Refills: 0 | COMMUNITY

## 2019-04-10 RX ORDER — MAGNESIUM SULFATE 500 MG/ML
2 VIAL (ML) INJECTION ONCE
Qty: 0 | Refills: 0 | Status: COMPLETED | OUTPATIENT
Start: 2019-04-10 | End: 2019-04-10

## 2019-04-10 RX ORDER — LISINOPRIL 2.5 MG/1
5 TABLET ORAL DAILY
Qty: 0 | Refills: 0 | Status: DISCONTINUED | OUTPATIENT
Start: 2019-04-10 | End: 2019-04-10

## 2019-04-10 RX ORDER — GABAPENTIN 400 MG/1
100 CAPSULE ORAL DAILY
Qty: 0 | Refills: 0 | Status: DISCONTINUED | OUTPATIENT
Start: 2019-04-10 | End: 2019-04-11

## 2019-04-10 RX ORDER — PREGABALIN 225 MG/1
1000 CAPSULE ORAL DAILY
Qty: 0 | Refills: 0 | Status: DISCONTINUED | OUTPATIENT
Start: 2019-04-10 | End: 2019-04-11

## 2019-04-10 RX ORDER — ATORVASTATIN CALCIUM 80 MG/1
10 TABLET, FILM COATED ORAL AT BEDTIME
Qty: 0 | Refills: 0 | Status: DISCONTINUED | OUTPATIENT
Start: 2019-04-10 | End: 2019-04-11

## 2019-04-10 RX ORDER — INSULIN DETEMIR 100/ML (3)
0 INSULIN PEN (ML) SUBCUTANEOUS
Qty: 15 | Refills: 0 | COMMUNITY

## 2019-04-10 RX ORDER — INSULIN DETEMIR 100/ML (3)
14 INSULIN PEN (ML) SUBCUTANEOUS
Qty: 0 | Refills: 0 | COMMUNITY

## 2019-04-10 RX ORDER — METOPROLOL TARTRATE 50 MG
25 TABLET ORAL DAILY
Qty: 0 | Refills: 0 | Status: DISCONTINUED | OUTPATIENT
Start: 2019-04-10 | End: 2019-04-11

## 2019-04-10 RX ORDER — METOPROLOL TARTRATE 50 MG
0 TABLET ORAL
Qty: 90 | Refills: 0 | COMMUNITY

## 2019-04-10 RX ORDER — AMLODIPINE BESYLATE 2.5 MG/1
5 TABLET ORAL ONCE
Qty: 0 | Refills: 0 | Status: COMPLETED | OUTPATIENT
Start: 2019-04-10 | End: 2019-04-10

## 2019-04-10 RX ORDER — ACETAMINOPHEN 500 MG
1000 TABLET ORAL ONCE
Qty: 0 | Refills: 0 | Status: COMPLETED | OUTPATIENT
Start: 2019-04-10 | End: 2019-04-10

## 2019-04-10 RX ORDER — PIOGLITAZONE HYDROCHLORIDE 15 MG/1
0 TABLET ORAL
Qty: 90 | Refills: 0 | COMMUNITY

## 2019-04-10 RX ORDER — LOVASTATIN 20 MG
0 TABLET ORAL
Qty: 90 | Refills: 0 | COMMUNITY

## 2019-04-10 RX ORDER — BENAZEPRIL HYDROCHLORIDE 40 MG/1
0 TABLET ORAL
Qty: 90 | Refills: 0 | COMMUNITY

## 2019-04-10 RX ORDER — LISINOPRIL 2.5 MG/1
5 TABLET ORAL DAILY
Qty: 0 | Refills: 0 | Status: DISCONTINUED | OUTPATIENT
Start: 2019-04-10 | End: 2019-04-11

## 2019-04-10 RX ADMIN — Medication 50 GRAM(S): at 17:10

## 2019-04-10 RX ADMIN — Medication 650 MILLIGRAM(S): at 19:22

## 2019-04-10 RX ADMIN — MAGNESIUM OXIDE 400 MG ORAL TABLET 400 MILLIGRAM(S): 241.3 TABLET ORAL at 11:51

## 2019-04-10 RX ADMIN — Medication 650 MILLIGRAM(S): at 12:50

## 2019-04-10 RX ADMIN — MAGNESIUM OXIDE 400 MG ORAL TABLET 400 MILLIGRAM(S): 241.3 TABLET ORAL at 07:25

## 2019-04-10 RX ADMIN — Medication 650 MILLIGRAM(S): at 18:42

## 2019-04-10 RX ADMIN — MAGNESIUM OXIDE 400 MG ORAL TABLET 400 MILLIGRAM(S): 241.3 TABLET ORAL at 17:10

## 2019-04-10 RX ADMIN — Medication 400 MILLIGRAM(S): at 05:58

## 2019-04-10 RX ADMIN — Medication 1: at 11:51

## 2019-04-10 RX ADMIN — GABAPENTIN 100 MILLIGRAM(S): 400 CAPSULE ORAL at 11:51

## 2019-04-10 RX ADMIN — AMLODIPINE BESYLATE 5 MILLIGRAM(S): 2.5 TABLET ORAL at 05:44

## 2019-04-10 RX ADMIN — Medication 1000 MILLIGRAM(S): at 06:30

## 2019-04-10 RX ADMIN — LISINOPRIL 5 MILLIGRAM(S): 2.5 TABLET ORAL at 21:29

## 2019-04-10 RX ADMIN — Medication 650 MILLIGRAM(S): at 11:51

## 2019-04-10 RX ADMIN — ATORVASTATIN CALCIUM 10 MILLIGRAM(S): 80 TABLET, FILM COATED ORAL at 21:04

## 2019-04-10 RX ADMIN — Medication 25 MILLIGRAM(S): at 21:29

## 2019-04-10 RX ADMIN — Medication 650 MILLIGRAM(S): at 00:20

## 2019-04-10 RX ADMIN — PREGABALIN 1000 MICROGRAM(S): 225 CAPSULE ORAL at 17:10

## 2019-04-10 NOTE — DIETITIAN INITIAL EVALUATION ADULT. - OTHER INFO
Pt states that her appetite has not been very good and she has been eating less. She was unable to report her usual weight but states that she has lost a large amount of weight. Pt reports eating less that 50% of her normal intake. Pt has severe muscle wasting at the temporal region. Discussed alternate food items Pt can order. Pt is willing to try the supplement Glucerna Shake. Pt has few teeth but she does has dentures with her.

## 2019-04-10 NOTE — DIETITIAN INITIAL EVALUATION ADULT. - PROBLEM SELECTOR PLAN 7
- Patient unsure of her medications, will need to clarify in AM, will email hospital pharmacist to help in her medication reconciliation  - Will monitor on HISS for now

## 2019-04-10 NOTE — CHART NOTE - NSCHARTNOTEFT_GEN_A_CORE
NUTRITION SERVICES                                                                                  MALNUTRITION ALERT     Attention Health Care Provider: Upon nutritional assessment by the Registered Dietitian your patient was determined to meet criteria / has evidence of the following diagnosis/diagnoses:    [ ] Mild Protein Calorie Malnutrition   [ ] Moderate Protein Calorie Malnutrition   [x ] Severe Protein Calorie Malnutrition   [ ] Unspecified Protein Calorie Malnutrition   [ ] Underweight / BMI <19  [ ] Morbid Obesity / BMI >40        By signing this assessment you are acknowledging the diagnosis/diagnoses.       PLAN OF CARE: Refer to Initial Dietitian Evaluation or Nutrition Follow-Up Documentation for Nutritional Recommendations.

## 2019-04-10 NOTE — DIETITIAN INITIAL EVALUATION ADULT. - PROBLEM SELECTOR PLAN 5
- Noted to have normocytic anemia, will check iron studies, ferritin level, retic count, B12, and folate in AM  - Possibly 2/2 CKD, monitor CBC in AM

## 2019-04-10 NOTE — DIETITIAN INITIAL EVALUATION ADULT. - PROBLEM SELECTOR PLAN 2
- Noted to have significant hypomagnesemia on lab work, possibly 2/2 poor PO intake as patient states she has not been eating well for a while  - Will c/w oral repletion for now as patient without any significant symptoms from her hypomagnesemia  - Will check an EKG to eval for any cardiac conduction abnormalities in setting of her hypomagnesemia -> EKG shows NSR with narrow complex QRS, 1st degree AV block, frequent PVCs

## 2019-04-11 ENCOUNTER — TRANSCRIPTION ENCOUNTER (OUTPATIENT)
Age: 84
End: 2019-04-11

## 2019-04-11 VITALS
SYSTOLIC BLOOD PRESSURE: 152 MMHG | TEMPERATURE: 98 F | HEART RATE: 68 BPM | RESPIRATION RATE: 18 BRPM | OXYGEN SATURATION: 100 % | DIASTOLIC BLOOD PRESSURE: 59 MMHG

## 2019-04-11 DIAGNOSIS — R60.0 LOCALIZED EDEMA: ICD-10-CM

## 2019-04-11 DIAGNOSIS — M79.604 PAIN IN RIGHT LEG: ICD-10-CM

## 2019-04-11 DIAGNOSIS — I10 ESSENTIAL (PRIMARY) HYPERTENSION: ICD-10-CM

## 2019-04-11 LAB
ALBUMIN SERPL ELPH-MCNC: 3.3 G/DL — SIGNIFICANT CHANGE UP (ref 3.3–5)
ALP SERPL-CCNC: 79 U/L — SIGNIFICANT CHANGE UP (ref 40–120)
ALT FLD-CCNC: 7 U/L — SIGNIFICANT CHANGE UP (ref 4–33)
ANION GAP SERPL CALC-SCNC: 11 MMO/L — SIGNIFICANT CHANGE UP (ref 7–14)
AST SERPL-CCNC: 15 U/L — SIGNIFICANT CHANGE UP (ref 4–32)
BILIRUB SERPL-MCNC: 0.7 MG/DL — SIGNIFICANT CHANGE UP (ref 0.2–1.2)
BUN SERPL-MCNC: 9 MG/DL — SIGNIFICANT CHANGE UP (ref 7–23)
CALCIUM SERPL-MCNC: 8.7 MG/DL — SIGNIFICANT CHANGE UP (ref 8.4–10.5)
CHLORIDE SERPL-SCNC: 104 MMOL/L — SIGNIFICANT CHANGE UP (ref 98–107)
CO2 SERPL-SCNC: 24 MMOL/L — SIGNIFICANT CHANGE UP (ref 22–31)
CREAT SERPL-MCNC: 1.05 MG/DL — SIGNIFICANT CHANGE UP (ref 0.5–1.3)
GLUCOSE BLDC GLUCOMTR-MCNC: 136 MG/DL — HIGH (ref 70–99)
GLUCOSE BLDC GLUCOMTR-MCNC: 184 MG/DL — HIGH (ref 70–99)
GLUCOSE BLDC GLUCOMTR-MCNC: 87 MG/DL — SIGNIFICANT CHANGE UP (ref 70–99)
GLUCOSE SERPL-MCNC: 140 MG/DL — HIGH (ref 70–99)
HCT VFR BLD CALC: 33.6 % — LOW (ref 34.5–45)
HGB BLD-MCNC: 10.7 G/DL — LOW (ref 11.5–15.5)
MAGNESIUM SERPL-MCNC: 1.6 MG/DL — SIGNIFICANT CHANGE UP (ref 1.6–2.6)
MCHC RBC-ENTMCNC: 28.5 PG — SIGNIFICANT CHANGE UP (ref 27–34)
MCHC RBC-ENTMCNC: 31.8 % — LOW (ref 32–36)
MCV RBC AUTO: 89.6 FL — SIGNIFICANT CHANGE UP (ref 80–100)
NRBC # FLD: 0 K/UL — SIGNIFICANT CHANGE UP (ref 0–0)
PHOSPHATE SERPL-MCNC: 2.6 MG/DL — SIGNIFICANT CHANGE UP (ref 2.5–4.5)
PLATELET # BLD AUTO: 200 K/UL — SIGNIFICANT CHANGE UP (ref 150–400)
PMV BLD: 10.3 FL — SIGNIFICANT CHANGE UP (ref 7–13)
POTASSIUM SERPL-MCNC: 4.3 MMOL/L — SIGNIFICANT CHANGE UP (ref 3.5–5.3)
POTASSIUM SERPL-SCNC: 4.3 MMOL/L — SIGNIFICANT CHANGE UP (ref 3.5–5.3)
PROT SERPL-MCNC: 6.5 G/DL — SIGNIFICANT CHANGE UP (ref 6–8.3)
RBC # BLD: 3.75 M/UL — LOW (ref 3.8–5.2)
RBC # FLD: 15.3 % — HIGH (ref 10.3–14.5)
SODIUM SERPL-SCNC: 139 MMOL/L — SIGNIFICANT CHANGE UP (ref 135–145)
WBC # BLD: 4.48 K/UL — SIGNIFICANT CHANGE UP (ref 3.8–10.5)
WBC # FLD AUTO: 4.48 K/UL — SIGNIFICANT CHANGE UP (ref 3.8–10.5)

## 2019-04-11 RX ORDER — METOPROLOL TARTRATE 50 MG
1 TABLET ORAL
Qty: 0 | Refills: 0 | COMMUNITY

## 2019-04-11 RX ORDER — ACETAMINOPHEN 500 MG
2 TABLET ORAL
Qty: 0 | Refills: 0 | DISCHARGE
Start: 2019-04-11

## 2019-04-11 RX ORDER — METOPROLOL TARTRATE 50 MG
1 TABLET ORAL
Qty: 0 | Refills: 0 | DISCHARGE
Start: 2019-04-11

## 2019-04-11 RX ORDER — GABAPENTIN 400 MG/1
1 CAPSULE ORAL
Qty: 30 | Refills: 0 | OUTPATIENT
Start: 2019-04-11 | End: 2019-05-10

## 2019-04-11 RX ORDER — INSULIN DETEMIR 100/ML (3)
10 INSULIN PEN (ML) SUBCUTANEOUS
Qty: 0 | Refills: 0 | Status: DISCONTINUED | OUTPATIENT
Start: 2019-04-11 | End: 2019-04-11

## 2019-04-11 RX ADMIN — PREGABALIN 1000 MICROGRAM(S): 225 CAPSULE ORAL at 12:04

## 2019-04-11 RX ADMIN — Medication 650 MILLIGRAM(S): at 08:01

## 2019-04-11 RX ADMIN — LISINOPRIL 5 MILLIGRAM(S): 2.5 TABLET ORAL at 08:15

## 2019-04-11 RX ADMIN — Medication 10 UNIT(S): at 08:01

## 2019-04-11 RX ADMIN — GABAPENTIN 100 MILLIGRAM(S): 400 CAPSULE ORAL at 12:04

## 2019-04-11 RX ADMIN — Medication 25 MILLIGRAM(S): at 08:16

## 2019-04-11 RX ADMIN — Medication 1: at 08:00

## 2019-04-11 NOTE — DISCHARGE NOTE NURSING/CASE MANAGEMENT/SOCIAL WORK - NSDCCRNAME_GEN_ALL_CORE_FT
[facility], [facility]. Please call this agency to request additional Home Attendant hours to assist you more at home.

## 2019-04-11 NOTE — DISCHARGE NOTE NURSING/CASE MANAGEMENT/SOCIAL WORK - NSDCDPATPORTLINK_GEN_ALL_CORE
You can access the [facility] Patient Portal, offered by [facility], by registering with the following website: [email or web address]

## 2019-04-11 NOTE — PROGRESS NOTE ADULT - PROBLEM SELECTOR PLAN 6
continue to monitor off meds  watch for hyperglycemia   finger sticks with short acting insulin sliding scale
continue to monitor off meds  watch for hyperglycemia   finger sticks with short acting insulin sliding scale

## 2019-04-11 NOTE — PROGRESS NOTE ADULT - PROBLEM SELECTOR PLAN 4
no intervention required at this time  patient is totally asymptomatic
no intervention required at this time  patient is totally asymptomatic

## 2019-04-11 NOTE — DISCHARGE NOTE NURSING/CASE MANAGEMENT/SOCIAL WORK - NSSCNAMETXT_GEN_ALL_CORE
[facility]. This agency will send a Nurse and a Physical Therapist to your home for continuation of care.

## 2019-04-11 NOTE — DISCHARGE NOTE PROVIDER - NSDCCPCAREPLAN_GEN_ALL_CORE_FT
PRINCIPAL DISCHARGE DIAGNOSIS  Diagnosis: Leg pain, bilateral  Assessment and Plan of Treatment: no acute or infectious findings. Gabapentin started for pain. Continue medications. Follow up with your PCP for further evaluation and management. Please call to make an appointment within 1-2 weeks of discharge. Rehab was refused. participate with home PT      SECONDARY DISCHARGE DIAGNOSES  Diagnosis: Essential hypertension  Assessment and Plan of Treatment: Continue current blood pressure medication regimen as directed. Monitor for any visual changes, headaches or dizziness.  Monitor blood pressure regularly.  Follow up with your PCP for further management for high blood pressure, please call to make appointment within 1 week of discharge    Diagnosis: Type 2 diabetes mellitus without complication, with long-term current use of insulin  Assessment and Plan of Treatment: Continue consistent carbohydrate diet.  Monitor blood glucose levels throughout the day before meals and at bedtime.  Record blood sugars and bring to outpatient providers appointment in order to be reviewed by your doctor for management modifications.  Be aware of diabetes management symptoms including feeling cool and clammy may be related to low glucose levels.  Feeling hot and dry may indicate high glucose levels.  If  you feel these symptoms, check your blood sugar.  Make regular podiatry appointments in order to have feet checked for wounds and toe nails cut by a doctor to prevent infections.    Diagnosis: Troponin level elevated  Assessment and Plan of Treatment: Asymptomatic; no intervention needed while hospitalized.  Follow up with your PCP for further evaluation and management. Please call to make an appointment within 1-2 weeks of discharge.    Diagnosis: Hyperlipidemia, unspecified hyperlipidemia type  Assessment and Plan of Treatment: Continue medications. Follow up with your PCP for further evaluation and management. Please call to make an appointment within 1-2 weeks of discharge.

## 2019-04-11 NOTE — PROVIDER CONTACT NOTE (OTHER) - ACTION/TREATMENT ORDERED:
Will look into it
Will order metoprolol and lisinopril, give BP medications now as well as scheduled in AM, recheck BP, continue to monitor
continue to monitor

## 2019-04-11 NOTE — PROVIDER CONTACT NOTE (OTHER) - ASSESSMENT
Patient AxOx4, forgetful at times, asymptomatic, denies chest pain, SOB, N/V/dizziness, no acute distress noted. Patient lying comfortably in bed, will continue to monitor

## 2019-04-11 NOTE — PROGRESS NOTE ADULT - PROBLEM SELECTOR PLAN 3
nutrition evaluation  will continue to monitor calories
nutrition evaluation  will continue to monitor calories

## 2019-04-11 NOTE — DISCHARGE NOTE PROVIDER - PROVIDER TOKENS
FREE:[LAST:[maximilian],FIRST:[kaia],PHONE:[phone]],FAX:[(   [phone]    -],ADDRESS:[your PCP],FOLLOWUP:[1 week]]

## 2019-04-11 NOTE — PROGRESS NOTE ADULT - ASSESSMENT
This is a 89F with history as above who presents to the hospital with complaints of b/l foot pain and swelling.
This is a 89F with history as above who presents to the hospital with complaints of b/l foot pain and swelling.

## 2019-04-11 NOTE — DISCHARGE NOTE PROVIDER - HOSPITAL COURSE
89F with history as above who presents to the hospital with complaints of b/l foot pain and swelling.        Foot pain, bilateral.      will continue to monitor     unclear etiology     X rays done - neg for fracture     PT rec rehab; patient refusing and son refusing subacute rehab ; patient would benefit from additional home services            Failure to thrive in adult.  Plan: nutrition evaluation    will continue to monitor calories.         Troponin level elevated.      no intervention required at this time    patient is totally asymptomatic.         Normocytic anemia.     iron studies unrevealing    will trend.         Type 2 diabetes mellitus without complication, with long-term current use of insulin.    continue to monitor off meds    watch for hyperglycemia     finger sticks with short acting insulin sliding scale.         Essential hypertension.       resume home meds on discharge.                 discharge home with home PT at their own risk and responsibility.

## 2019-04-11 NOTE — PROGRESS NOTE ADULT - PROBLEM SELECTOR PLAN 1
resolved  will continue to monitor   unclear etiology   X Rays pending
resolved  will continue to monitor   unclear etiology   X rays done   will check results before discharge

## 2019-04-11 NOTE — PROGRESS NOTE ADULT - ATTENDING COMMENTS
discussed with patient in detail, all questions answered  and d/w son over the phone in detail   they are both adamantly opposed to Subacute Rehab as recommended by PT  they are both aware of the risks of fall. fracture etc and state that 'they will deal with it if that happens'  discharge home with home PT at their own risk and responsibility
eventual discharge to subacute rehab when cleared by all services

## 2019-04-11 NOTE — PROGRESS NOTE ADULT - SUBJECTIVE AND OBJECTIVE BOX
Patient is a 89y old  Female who presents with a chief complaint of b/l feet pain and swelling (2019 22:56)  patient well known to me, assigned this AM to assume care  chart reviewed and events thus far noted   admitted overnight by full time hospitalist service     SUBJECTIVE / OVERNIGHT EVENTS: no overnight events    ROS:  Resp: No cough no sputum production  CVS: No chest pain no palpitations no orthopnea  GI: no N/V/D  : no dysuria, no hematuria  Neuro: no weakness no paresthesias  Heme: No petechiae no easy bruising  Msk: No joint pain no swelling  Skin: No rash no itching        MEDICATIONS  (STANDING):  atorvastatin 10 milliGRAM(s) Oral at bedtime  cyanocobalamin Injectable 1000 MICROGram(s) IntraMuscular daily  dextrose 5%. 1000 milliLiter(s) (50 mL/Hr) IV Continuous <Continuous>  dextrose 50% Injectable 12.5 Gram(s) IV Push once  dextrose 50% Injectable 25 Gram(s) IV Push once  dextrose 50% Injectable 25 Gram(s) IV Push once  gabapentin 100 milliGRAM(s) Oral daily  insulin lispro (HumaLOG) corrective regimen sliding scale   SubCutaneous three times a day before meals  insulin lispro (HumaLOG) corrective regimen sliding scale   SubCutaneous at bedtime  magnesium oxide 400 milliGRAM(s) Oral three times a day with meals    MEDICATIONS  (PRN):  acetaminophen   Tablet .. 650 milliGRAM(s) Oral every 6 hours PRN Moderate Pain (4 - 6), Severe Pain (7 - 10)  dextrose 40% Gel 15 Gram(s) Oral once PRN Blood Glucose LESS THAN 70 milliGRAM(s)/deciliter  glucagon  Injectable 1 milliGRAM(s) IntraMuscular once PRN Glucose LESS THAN 70 milligrams/deciliter        CAPILLARY BLOOD GLUCOSE      POCT Blood Glucose.: 182 mg/dL (10 Apr 2019 11:29)  POCT Blood Glucose.: 130 mg/dL (10 Apr 2019 07:40)  POCT Blood Glucose.: 278 mg/dL (2019 22:16)    I&O's Summary      Vital Signs Last 24 Hrs  T(C): 36.4 (10 Apr 2019 05:20), Max: 36.8 (2019 20:36)  T(F): 97.6 (10 Apr 2019 05:20), Max: 98.3 (2019 20:36)  HR: 64 (10 Apr 2019 07:20) (64 - 84)  BP: 145/60 (10 Apr 2019 07:20) (132/101 - 177/58)  BP(mean): --  RR: 16 (10 Apr 2019 05:20) (16 - 18)  SpO2: 96% (10 Apr 2019 05:20) (96% - 100%)    PHYSICAL EXAM: vital signs as above  in no apparent distress  HEENT: ESTER EOMI  Neck: Supple, no JVD, no thyromegaly  Lungs: no rhonchi, no wheeze, no crackles  CVS: S1 S2 no M/R/G  Abdomen: no tenderness, no organomegaly, BS present  Neuro: Alert, no focal weakness, no sensory abnormalities  Psych: appropriate affect  Skin: warm, dry  Ext: no cyanosis or clubbing, no edema  Msk: no joint swelling or deformities  Back: no CVA tenderness, no kyphosis/scoliosis     LABS:                        10.2   4.25  )-----------( 197      ( 10 Apr 2019 05:55 )             32.4     04-10    141  |  108<H>  |  11  ----------------------------<  128<H>  3.8   |  23  |  1.15    Ca    8.9      10 Apr 2019 05:55  Phos  3.1     04-10  Mg     1.3     04-10    TPro  7.7  /  Alb  4.1  /  TBili  0.7  /  DBili  x   /  AST  18  /  ALT  8   /  AlkPhos  87  04-09    PT/INR - ( 2019 16:25 )   PT: 11.0 SEC;   INR: 0.97          PTT - ( 2019 16:25 )  PTT:30.7 SEC      Urinalysis Basic - ( 2019 18:40 )    Color: COLORLESS / Appearance: CLEAR / S.007 / pH: 6.5  Gluc: NEGATIVE / Ketone: NEGATIVE  / Bili: NEGATIVE / Urobili: NORMAL   Blood: NEGATIVE / Protein: 10 / Nitrite: NEGATIVE   Leuk Esterase: NEGATIVE / RBC: x / WBC x   Sq Epi: x / Non Sq Epi: x / Bacteria: x          All consultant(s) notes reviewed and care discussed with other providers        Contact Number, Dr Fernando [phone]
Patient is a 89y old  Female who presents with a chief complaint of b/l feet pain and swelling (10 Apr 2019 14:21)      SUBJECTIVE / OVERNIGHT EVENTS: no overnight events  "I want to go home"    ROS:  Resp: No cough no sputum production  CVS: No chest pain no palpitations no orthopnea  GI: no N/V/D  : no dysuria, no hematuria  Neuro: no weakness no paresthesias  Heme: No petechiae no easy bruising  Msk: No joint pain no swelling  Skin: No rash no itching        MEDICATIONS  (STANDING):  atorvastatin 10 milliGRAM(s) Oral at bedtime  cyanocobalamin Injectable 1000 MICROGram(s) IntraMuscular daily  dextrose 5%. 1000 milliLiter(s) (50 mL/Hr) IV Continuous <Continuous>  dextrose 50% Injectable 12.5 Gram(s) IV Push once  dextrose 50% Injectable 25 Gram(s) IV Push once  dextrose 50% Injectable 25 Gram(s) IV Push once  gabapentin 100 milliGRAM(s) Oral daily  insulin detemir injectable (LEVEMIR) 10 Unit(s) SubCutaneous two times a day  insulin lispro (HumaLOG) corrective regimen sliding scale   SubCutaneous three times a day before meals  insulin lispro (HumaLOG) corrective regimen sliding scale   SubCutaneous at bedtime  lisinopril 5 milliGRAM(s) Oral daily  metoprolol tartrate 25 milliGRAM(s) Oral daily    MEDICATIONS  (PRN):  acetaminophen   Tablet .. 650 milliGRAM(s) Oral every 6 hours PRN Moderate Pain (4 - 6), Severe Pain (7 - 10)  dextrose 40% Gel 15 Gram(s) Oral once PRN Blood Glucose LESS THAN 70 milliGRAM(s)/deciliter  glucagon  Injectable 1 milliGRAM(s) IntraMuscular once PRN Glucose LESS THAN 70 milligrams/deciliter        CAPILLARY BLOOD GLUCOSE      POCT Blood Glucose.: 184 mg/dL (2019 07:38)  POCT Blood Glucose.: 195 mg/dL (10 Apr 2019 21:20)  POCT Blood Glucose.: 119 mg/dL (10 Apr 2019 16:53)  POCT Blood Glucose.: 182 mg/dL (10 Apr 2019 11:29)    I&O's Summary      Vital Signs Last 24 Hrs  T(C): 36.7 (2019 07:09), Max: 37 (10 Apr 2019 14:32)  T(F): 98.1 (2019 07:09), Max: 98.6 (10 Apr 2019 14:32)  HR: 68 (2019 08:00) (58 - 79)  BP: 138/52 (2019 08:00) (129/45 - 172/72)  BP(mean): --  RR: 18 (2019 07:09) (17 - 18)  SpO2: 98% (2019 07:09) (98% - 100%)    PHYSICAL EXAM: vital signs as above  in no apparent distress  HEENT: ESTER EOMI  Neck: Supple, no JVD, no thyromegaly  Lungs: no rhonchi, no wheeze, no crackles  CVS: S1 S2 no M/R/G  Abdomen: no tenderness, no organomegaly, BS present  Neuro: Alert, oriented x 3 no focal weakness, no sensory abnormalities  Psych: appropriate affect  Skin: warm, dry  Ext: no cyanosis or clubbing, no edema  Msk: no joint swelling or deformities  Back: no CVA tenderness, no kyphosis/scoliosis     LABS:                        10.7   4.48  )-----------( 200      ( 2019 05:55 )             33.6     04-11    139  |  104  |  9   ----------------------------<  140<H>  4.3   |  24  |  1.05    Ca    8.7      2019 05:55  Phos  2.6     04-11  Mg     1.6     04-11    TPro  6.5  /  Alb  3.3  /  TBili  0.7  /  DBili  x   /  AST  15  /  ALT  7   /  AlkPhos  79  04-11    PT/INR - ( 2019 16:25 )   PT: 11.0 SEC;   INR: 0.97          PTT - ( 2019 16:25 )  PTT:30.7 SEC      Urinalysis Basic - ( 2019 18:40 )    Color: COLORLESS / Appearance: CLEAR / S.007 / pH: 6.5  Gluc: NEGATIVE / Ketone: NEGATIVE  / Bili: NEGATIVE / Urobili: NORMAL   Blood: NEGATIVE / Protein: 10 / Nitrite: NEGATIVE   Leuk Esterase: NEGATIVE / RBC: x / WBC x   Sq Epi: x / Non Sq Epi: x / Bacteria: x          All consultant(s) notes reviewed and care discussed with other providers        Contact Number, Dr Fernando [phone]

## 2019-04-11 NOTE — PROVIDER CONTACT NOTE (OTHER) - ASSESSMENT
Patient AxOx4, asymptomatic, denies chest pain, SOB, N/V/dizziness, no acute distress noted. Patient got BP medications in AM as ordered, will continue to monitor

## 2019-04-16 ENCOUNTER — INPATIENT (INPATIENT)
Facility: HOSPITAL | Age: 84
LOS: 6 days | Discharge: HOME HEALTH | End: 2019-04-23
Attending: INTERNAL MEDICINE | Admitting: INTERNAL MEDICINE
Payer: MEDICARE

## 2019-04-16 VITALS
TEMPERATURE: 98 F | HEART RATE: 89 BPM | SYSTOLIC BLOOD PRESSURE: 133 MMHG | DIASTOLIC BLOOD PRESSURE: 81 MMHG | OXYGEN SATURATION: 100 % | RESPIRATION RATE: 18 BRPM

## 2019-04-16 DIAGNOSIS — Z98.41 CATARACT EXTRACTION STATUS, RIGHT EYE: Chronic | ICD-10-CM

## 2019-04-16 DIAGNOSIS — M79.673 PAIN IN UNSPECIFIED FOOT: ICD-10-CM

## 2019-04-16 DIAGNOSIS — Z98.890 OTHER SPECIFIED POSTPROCEDURAL STATES: Chronic | ICD-10-CM

## 2019-04-16 PROBLEM — N18.3 CHRONIC KIDNEY DISEASE, STAGE 3 (MODERATE): Chronic | Status: ACTIVE | Noted: 2019-04-10

## 2019-04-16 LAB
ALBUMIN SERPL ELPH-MCNC: 3.9 G/DL — SIGNIFICANT CHANGE UP (ref 3.3–5)
ALP SERPL-CCNC: 95 U/L — SIGNIFICANT CHANGE UP (ref 40–120)
ALT FLD-CCNC: 13 U/L — SIGNIFICANT CHANGE UP (ref 4–33)
ANION GAP SERPL CALC-SCNC: 14 MMO/L — SIGNIFICANT CHANGE UP (ref 7–14)
APTT BLD: 28.5 SEC — SIGNIFICANT CHANGE UP (ref 27.5–36.3)
AST SERPL-CCNC: 17 U/L — SIGNIFICANT CHANGE UP (ref 4–32)
BASE EXCESS BLDV CALC-SCNC: 0.4 MMOL/L — SIGNIFICANT CHANGE UP
BASOPHILS # BLD AUTO: 0.03 K/UL — SIGNIFICANT CHANGE UP (ref 0–0.2)
BASOPHILS NFR BLD AUTO: 0.4 % — SIGNIFICANT CHANGE UP (ref 0–2)
BILIRUB SERPL-MCNC: 0.7 MG/DL — SIGNIFICANT CHANGE UP (ref 0.2–1.2)
BLOOD GAS VENOUS - CREATININE: SIGNIFICANT CHANGE UP MG/DL (ref 0.5–1.3)
BUN SERPL-MCNC: 11 MG/DL — SIGNIFICANT CHANGE UP (ref 7–23)
CALCIUM SERPL-MCNC: 9.3 MG/DL — SIGNIFICANT CHANGE UP (ref 8.4–10.5)
CHLORIDE BLDV-SCNC: 105 MMOL/L — SIGNIFICANT CHANGE UP (ref 96–108)
CHLORIDE SERPL-SCNC: 101 MMOL/L — SIGNIFICANT CHANGE UP (ref 98–107)
CO2 SERPL-SCNC: 23 MMOL/L — SIGNIFICANT CHANGE UP (ref 22–31)
CREAT SERPL-MCNC: 1.25 MG/DL — SIGNIFICANT CHANGE UP (ref 0.5–1.3)
EOSINOPHIL # BLD AUTO: 0.15 K/UL — SIGNIFICANT CHANGE UP (ref 0–0.5)
EOSINOPHIL NFR BLD AUTO: 2.2 % — SIGNIFICANT CHANGE UP (ref 0–6)
GAS PNL BLDV: 137 MMOL/L — SIGNIFICANT CHANGE UP (ref 136–146)
GLUCOSE BLDV-MCNC: 244 — HIGH (ref 70–99)
GLUCOSE SERPL-MCNC: 246 MG/DL — HIGH (ref 70–99)
HCO3 BLDV-SCNC: 24 MMOL/L — SIGNIFICANT CHANGE UP (ref 20–27)
HCT VFR BLD CALC: 33.6 % — LOW (ref 34.5–45)
HCT VFR BLDV CALC: 32.7 % — LOW (ref 34.5–45)
HGB BLD-MCNC: 10.5 G/DL — LOW (ref 11.5–15.5)
HGB BLDV-MCNC: 10.6 G/DL — LOW (ref 11.5–15.5)
IMM GRANULOCYTES NFR BLD AUTO: 0.4 % — SIGNIFICANT CHANGE UP (ref 0–1.5)
INR BLD: 1.03 — SIGNIFICANT CHANGE UP (ref 0.88–1.17)
LACTATE BLDV-MCNC: 2.2 MMOL/L — HIGH (ref 0.5–2)
LYMPHOCYTES # BLD AUTO: 2.23 K/UL — SIGNIFICANT CHANGE UP (ref 1–3.3)
LYMPHOCYTES # BLD AUTO: 33 % — SIGNIFICANT CHANGE UP (ref 13–44)
MCHC RBC-ENTMCNC: 28.6 PG — SIGNIFICANT CHANGE UP (ref 27–34)
MCHC RBC-ENTMCNC: 31.3 % — LOW (ref 32–36)
MCV RBC AUTO: 91.6 FL — SIGNIFICANT CHANGE UP (ref 80–100)
MONOCYTES # BLD AUTO: 0.73 K/UL — SIGNIFICANT CHANGE UP (ref 0–0.9)
MONOCYTES NFR BLD AUTO: 10.8 % — SIGNIFICANT CHANGE UP (ref 2–14)
NEUTROPHILS # BLD AUTO: 3.59 K/UL — SIGNIFICANT CHANGE UP (ref 1.8–7.4)
NEUTROPHILS NFR BLD AUTO: 53.2 % — SIGNIFICANT CHANGE UP (ref 43–77)
NRBC # FLD: 0 K/UL — SIGNIFICANT CHANGE UP (ref 0–0)
PCO2 BLDV: 48 MMHG — SIGNIFICANT CHANGE UP (ref 41–51)
PH BLDV: 7.34 PH — SIGNIFICANT CHANGE UP (ref 7.32–7.43)
PLATELET # BLD AUTO: 268 K/UL — SIGNIFICANT CHANGE UP (ref 150–400)
PMV BLD: 10.7 FL — SIGNIFICANT CHANGE UP (ref 7–13)
PO2 BLDV: 29 MMHG — LOW (ref 35–40)
POTASSIUM BLDV-SCNC: 4 MMOL/L — SIGNIFICANT CHANGE UP (ref 3.4–4.5)
POTASSIUM SERPL-MCNC: 4 MMOL/L — SIGNIFICANT CHANGE UP (ref 3.5–5.3)
POTASSIUM SERPL-SCNC: 4 MMOL/L — SIGNIFICANT CHANGE UP (ref 3.5–5.3)
PROT SERPL-MCNC: 7.3 G/DL — SIGNIFICANT CHANGE UP (ref 6–8.3)
PROTHROM AB SERPL-ACNC: 11.5 SEC — SIGNIFICANT CHANGE UP (ref 9.8–13.1)
RBC # BLD: 3.67 M/UL — LOW (ref 3.8–5.2)
RBC # FLD: 15.6 % — HIGH (ref 10.3–14.5)
SAO2 % BLDV: 46.6 % — LOW (ref 60–85)
SODIUM SERPL-SCNC: 138 MMOL/L — SIGNIFICANT CHANGE UP (ref 135–145)
WBC # BLD: 6.76 K/UL — SIGNIFICANT CHANGE UP (ref 3.8–10.5)
WBC # FLD AUTO: 6.76 K/UL — SIGNIFICANT CHANGE UP (ref 3.8–10.5)

## 2019-04-16 PROCEDURE — 99223 1ST HOSP IP/OBS HIGH 75: CPT

## 2019-04-16 PROCEDURE — 93925 LOWER EXTREMITY STUDY: CPT | Mod: 26

## 2019-04-16 RX ORDER — DEXTROSE 50 % IN WATER 50 %
15 SYRINGE (ML) INTRAVENOUS ONCE
Qty: 0 | Refills: 0 | Status: DISCONTINUED | OUTPATIENT
Start: 2019-04-16 | End: 2019-04-23

## 2019-04-16 RX ORDER — LISINOPRIL 2.5 MG/1
5 TABLET ORAL DAILY
Qty: 0 | Refills: 0 | Status: DISCONTINUED | OUTPATIENT
Start: 2019-04-16 | End: 2019-04-18

## 2019-04-16 RX ORDER — DEXTROSE 50 % IN WATER 50 %
25 SYRINGE (ML) INTRAVENOUS ONCE
Qty: 0 | Refills: 0 | Status: DISCONTINUED | OUTPATIENT
Start: 2019-04-16 | End: 2019-04-23

## 2019-04-16 RX ORDER — ACETAMINOPHEN 500 MG
650 TABLET ORAL EVERY 6 HOURS
Qty: 0 | Refills: 0 | Status: DISCONTINUED | OUTPATIENT
Start: 2019-04-16 | End: 2019-04-23

## 2019-04-16 RX ORDER — LOVASTATIN 20 MG
1 TABLET ORAL
Qty: 0 | Refills: 0 | COMMUNITY

## 2019-04-16 RX ORDER — CILOSTAZOL 100 MG/1
100 TABLET ORAL
Qty: 0 | Refills: 0 | Status: DISCONTINUED | OUTPATIENT
Start: 2019-04-16 | End: 2019-04-23

## 2019-04-16 RX ORDER — INSULIN GLARGINE 100 [IU]/ML
10 INJECTION, SOLUTION SUBCUTANEOUS AT BEDTIME
Qty: 0 | Refills: 0 | Status: DISCONTINUED | OUTPATIENT
Start: 2019-04-16 | End: 2019-04-16

## 2019-04-16 RX ORDER — MORPHINE SULFATE 50 MG/1
2 CAPSULE, EXTENDED RELEASE ORAL ONCE
Qty: 0 | Refills: 0 | Status: DISCONTINUED | OUTPATIENT
Start: 2019-04-16 | End: 2019-04-16

## 2019-04-16 RX ORDER — INSULIN LISPRO 100/ML
VIAL (ML) SUBCUTANEOUS AT BEDTIME
Qty: 0 | Refills: 0 | Status: DISCONTINUED | OUTPATIENT
Start: 2019-04-16 | End: 2019-04-23

## 2019-04-16 RX ORDER — DEXTROSE 50 % IN WATER 50 %
12.5 SYRINGE (ML) INTRAVENOUS ONCE
Qty: 0 | Refills: 0 | Status: DISCONTINUED | OUTPATIENT
Start: 2019-04-16 | End: 2019-04-23

## 2019-04-16 RX ORDER — METOPROLOL TARTRATE 50 MG
25 TABLET ORAL
Qty: 0 | Refills: 0 | Status: DISCONTINUED | OUTPATIENT
Start: 2019-04-16 | End: 2019-04-18

## 2019-04-16 RX ORDER — ACETAMINOPHEN 500 MG
650 TABLET ORAL ONCE
Qty: 0 | Refills: 0 | Status: COMPLETED | OUTPATIENT
Start: 2019-04-16 | End: 2019-04-16

## 2019-04-16 RX ORDER — SODIUM CHLORIDE 9 MG/ML
1000 INJECTION, SOLUTION INTRAVENOUS
Qty: 0 | Refills: 0 | Status: DISCONTINUED | OUTPATIENT
Start: 2019-04-16 | End: 2019-04-23

## 2019-04-16 RX ORDER — INSULIN DETEMIR 100/ML (3)
5 INSULIN PEN (ML) SUBCUTANEOUS
Qty: 0 | Refills: 0 | Status: DISCONTINUED | OUTPATIENT
Start: 2019-04-16 | End: 2019-04-23

## 2019-04-16 RX ORDER — GLUCAGON INJECTION, SOLUTION 0.5 MG/.1ML
1 INJECTION, SOLUTION SUBCUTANEOUS ONCE
Qty: 0 | Refills: 0 | Status: DISCONTINUED | OUTPATIENT
Start: 2019-04-16 | End: 2019-04-23

## 2019-04-16 RX ORDER — INSULIN LISPRO 100/ML
VIAL (ML) SUBCUTANEOUS
Qty: 0 | Refills: 0 | Status: DISCONTINUED | OUTPATIENT
Start: 2019-04-16 | End: 2019-04-23

## 2019-04-16 RX ORDER — HEPARIN SODIUM 5000 [USP'U]/ML
5000 INJECTION INTRAVENOUS; SUBCUTANEOUS EVERY 8 HOURS
Qty: 0 | Refills: 0 | Status: DISCONTINUED | OUTPATIENT
Start: 2019-04-16 | End: 2019-04-23

## 2019-04-16 RX ADMIN — Medication 650 MILLIGRAM(S): at 23:35

## 2019-04-16 RX ADMIN — MORPHINE SULFATE 2 MILLIGRAM(S): 50 CAPSULE, EXTENDED RELEASE ORAL at 17:55

## 2019-04-16 RX ADMIN — MORPHINE SULFATE 2 MILLIGRAM(S): 50 CAPSULE, EXTENDED RELEASE ORAL at 17:42

## 2019-04-16 RX ADMIN — Medication 650 MILLIGRAM(S): at 23:53

## 2019-04-16 RX ADMIN — MORPHINE SULFATE 2 MILLIGRAM(S): 50 CAPSULE, EXTENDED RELEASE ORAL at 10:08

## 2019-04-16 NOTE — H&P ADULT - ASSESSMENT
This is a 89W with history as above who presents to the hospital with complaints of b/l feet pain found to have b/l multilevel PAD.

## 2019-04-16 NOTE — ED ADULT TRIAGE NOTE - CHIEF COMPLAINT QUOTE
pt was seen 3 days for same c/o for foot pain had neg US. presents for atraumatic foot pain hx of DM.

## 2019-04-16 NOTE — H&P ADULT - PROBLEM SELECTOR PLAN 5
- lisinopril as therapeutic interchange for home benazepril - lisinopril as therapeutic interchange for home benazepril  - c/w home metoprolol

## 2019-04-16 NOTE — H&P ADULT - HISTORY OF PRESENT ILLNESS
This is a 89W with history of DM2, CKD 3, HTN, HLD, and OA who presents to the hospital with complaints of persistent b/l feet pain. Said that the pain has been ongoing for a long time and is unrelenting. Said that the pain comes and goes and denies it being exacerbated by walking. Said that she was recently given a new medication to help with the pain (gabapentin) but denies that medication improving the pain. Said that she has become more bed bound due to the pain. Otherwise denies any other complaints at this time. Said that her appetite is still poor. Patient was recently hospitalized from 4/9 - 4/11 for the same foot pain and hypomagnesemia. She was seen by PT during that admission and was recommended to go to [facility] but she and her son deferred against that and she instead went back home. Her hypomagnesemia was also repleted and was wnl on her discharge.     On arrival to the hospital, her vitals were T 98. P 89, /81, R 18, O2 sat 100% RA. Her lab work did not show any acute abnormalities. Her lactate was noted to be mildly elevated to 2.2. She had JAMIE study done that showed that she had multilevel PAD of b/l legs. She was given morphine 2mg IVP x2. She was admitted to medicine for further management.

## 2019-04-16 NOTE — H&P ADULT - NSHPLABSRESULTS_GEN_ALL_CORE
LABS and ADDITIONAL STUDIES:                        10.5   6.76  )-----------( 268      ( 16 Apr 2019 09:20 )             33.6     04-16    138  |  101  |  11  ----------------------------<  246<H>  4.0   |  23  |  1.25    Ca    9.3      16 Apr 2019 09:27    TPro  7.3  /  Alb  3.9  /  TBili  0.7  /  DBili  x   /  AST  17  /  ALT  13  /  AlkPhos  95  04-16    LIVER FUNCTIONS - ( 16 Apr 2019 09:27 )  Alb: 3.9 g/dL / Pro: 7.3 g/dL / ALK PHOS: 95 u/L / ALT: 13 u/L / AST: 17 u/L / GGT: x           PT/INR - ( 16 Apr 2019 09:20 )   PT: 11.5 SEC;   INR: 1.03     PTT - ( 16 Apr 2019 09:20 )  PTT:28.5 SEC    < from: [facility] JAMIE. (04.16.19 @ 15:59) >  Summary/Impressions:  1.  Right JAMIE is severely decreased (0.29). Right TBI is  severely decreased (0.20).  Waveform and segmental  pressure analyses suggest the presence of multi-segment  arterial disease in the right lower extremity.  2.  Left JAMIE is severely decreased (0.22).  Waveform and  segmental pressure analyses suggest the presence of  multi-segment arterial disease in the left lower  extremity.  < end of copied text >    < from: Xray Foot AP + Lateral + Oblique, Bilat (04.10.19 @ 18:15) >  IMPRESSION:  No tracking soft tissue gas collections, radiopaque foreign bodies, or   gross radiographic evidence for osteomyelitis.    No fractures or dislocations.    Tarsometatarsal alignment maintained without evidence for a Lisfranc   injury.    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 Preserved remaining visualized   joint spaces and no joint margin erosions.    Generalized marked osteopenia otherwise no discrete lytic or blastic   lesions.    Vascular calcifications.    Hypertrophic elongated appearing nail shadows, correlate for   onychomycosis.  < end of copied text >

## 2019-04-16 NOTE — H&P ADULT - NSICDXPASTMEDICALHX_GEN_ALL_CORE_FT
PAST MEDICAL HISTORY:  DM (diabetes mellitus)     HLD (hyperlipidemia)     HTN (hypertension)     OA (osteoarthritis)     Stage III chronic kidney disease

## 2019-04-16 NOTE — ED PROVIDER NOTE - OBJECTIVE STATEMENT
Pt back to bed at this time   89F p/w b/l foot pain for the past month. patient states pain has not improved since last visit and now unable to walk secondary to pain for the past 2 days. denies any trauma, fever, chills.

## 2019-04-16 NOTE — H&P ADULT - PROBLEM SELECTOR PLAN 2
- Will place on cilostazole  - Patient states that she used to be on a baby aspirin but was taken off a while ago due to her recurrent falls, will therefore monitor off aspirin for now - Will place on cilostazol  - Patient states that she used to be on a baby aspirin but was taken off a while ago due to her recurrent falls, will therefore monitor off aspirin for now  - Seen by vascular surgery, might be a candidate for intervention, f/u further recs

## 2019-04-16 NOTE — H&P ADULT - PROBLEM SELECTOR PLAN 4
- Will place on atorvastatin 10mg qhs but might require uptitrating given her PAD  - Will check lipid profile in AM

## 2019-04-16 NOTE — H&P ADULT - NSHPREVIEWOFSYSTEMS_GEN_ALL_CORE
REVIEW OF SYSTEMS:    CONSTITUTIONAL: No weakness, fevers or chills  EYES: No visual changes or eye discharge  ENT: No rhinorrhea or sore throat  NECK: No pain or stiffness  RESPIRATORY: No cough, wheezing, hemoptysis; No shortness of breath  CARDIOVASCULAR: No chest pain or palpitations; No lower extremity edema  GASTROINTESTINAL: No abdominal or epigastric pain. No nausea, vomiting, or hematemesis; No diarrhea or constipation. No melena or hematochezia.  MSK: +b/l Feet pain; No back pain  GENITOURINARY: No dysuria, frequency or hematuria  NEUROLOGICAL: No numbness or weakness  SKIN: No itching, burning, rashes, or lesions

## 2019-04-16 NOTE — H&P ADULT - NSHPSOCIALHISTORY_GEN_ALL_CORE
Lives alone, had a HHA for 4 days a week for 4 hrs a day  Denies any tobacco, etoh, or illicit substance use

## 2019-04-16 NOTE — ED PROVIDER NOTE - CROS ED [facility] ALL NEG
CCU Subjective





- Physician Review


Subjective (Free Text): 





09/18/18 17:27


70 Years old Female with PMHx of HTN, Diabetes, Gastritis, Chronic Kidney 

Disease, Seizures and End Stage Renal Disease (Hemodialysis M, W, F)


Who presented to the Emergency department by EMS for evaluation of shortness of 

breath. 


Patient was scheduled for her HD today but when the transport came to pick her 

up for dialysis, she was noticed to be in distress, 


complaining of shortness of breath and was brought directly to the emergency 

department 


Labs revealed hyperkalemia and EKG revealed peaked T waves for which he 

received Amp of bicarbonate, amp of calcium gluconate and amp of D50.


Patient also had fever in the ER, Antibiotics Vanco and Zosyn was given, but no 

culture sent


CX-Ray showed Cardiomegaly and pulmonary vascular congestion


ID consulted


B/L UE and LE ordered due to swelling to R/O DVT


Pt admitted to the ICu and currently receiving emergent HD


She is awake, confused, comfortable, NAD





 








CCU Objective





- Vital Signs / Intake & Output


Vital Signs (Last 4 hours): 


Vital Signs











  Temp Pulse Pulse Resp BP Pulse Ox


 


 09/18/18 16:00  99.1 F  89   22  145/71  98


 


 09/18/18 14:52    89  25 H   96


 


 09/18/18 14:30   90   21  148/68  95


 


 09/18/18 13:26       99











Intake and Output (Last 8hrs): 


 Intake & Output











 09/18/18 09/18/18 09/18/18





 06:59 14:59 22:59


 


Weight  150 lb 














- Physical Exam


Physical Exam Limitations: Positive for: Altered Mental Status


Head: Positive for: Atraumatic, Normocephalic


Pupils: Positive for: PERRL


Extroacular Muscles: Positive for: EOMI


Conjunctiva: Positive for: Normal


Ears: Positive for: Normal


Mouth: Positive for: Moist Mucous Membranes


Pharnyx: Positive for: Normal


Nose (Internal): Positive for: Normal Inspection


Neck: Positive for: Normal Range of Motion, Trachea Midline.  Negative for: 

Meningeal Signs, MIDLINE TENDERNESS, Paraspinal Tenderness, JVD, Lymphadenopathy

, Bruit, Other


Respiratory/Chest: Positive for: Decreased Breath Sounds, Rales, Rhonchi.  

Negative for: Clear to Auscultation, Respiratory Distress, Accessory Muscle Use


Abdomen: Positive for: Normal Bowel Sounds.  Negative for: Tenderness, 

Distention


Back: Negative for: CVA Tenderness


Neurological: Positive for: Motor Func Grossly Intact, Normal Sensory Function


Psychiatric: Positive for: Alert.  Negative for: Oriented x 3





- Medications


Active Medications: 


Active Medications











Generic Name Dose Route Start Last Admin





  Trade Name Freq  PRN Reason Stop Dose Admin


 


Heparin Sodium (Porcine)  5,000 units  09/18/18 17:00  09/18/18 16:30





  Heparin  SC   5,000 units





  Q8 BOWEN   Administration





  Protocol   


 


Pantoprazole Sodium  40 mg  09/18/18 16:45  





  Protonix Ec Tab  PO   





  DAILY BOWEN   














- Patient Studies


Lab Studies: 


 Lab Studies











  09/18/18 09/18/18 09/18/18 Range/Units





  11:38 11:38 11:38 


 


WBC    11.3 H  (4.8-10.8)  K/uL


 


RBC    4.43  (3.80-5.20)  Mil/uL


 


Hgb    12.1  (12.0-16.0)  g/dL


 


Hct    37.5  (34.0-47.0)  %


 


MCV    84.6  D  (81.0-99.0)  fl


 


MCH    27.3  (27.0-31.0)  pg


 


MCHC    32.3 L  (33.0-37.0)  g/dL


 


RDW    17.7 H  (11.5-14.5)  %


 


Plt Count    217  D  (130-400)  K/uL


 


MPV    9.3  (7.2-11.7)  fl


 


Neut % (Auto)    91.4 H  (50.0-75.0)  %


 


Lymph % (Auto)    5.8 L  (20.0-40.0)  %


 


Mono % (Auto)    2.3  (0.0-10.0)  %


 


Eos % (Auto)    0.4  (0.0-4.0)  %


 


Baso % (Auto)    0.1  (0.0-2.0)  %


 


Neut # (Auto)    10.4 H  (1.8-7.0)  K/uL


 


Lymph # (Auto)    0.7 L  (1.0-4.3)  K/uL


 


Mono # (Auto)    0.3  (0.0-0.8)  K/uL


 


Eos # (Auto)    0.0  (0.0-0.7)  K/uL


 


Baso # (Auto)    0.0  (0.0-0.2)  K/uL


 


Neutrophils % (Manual)    88 H  (42-75)  %


 


Band Neutrophils %    3 H  (0-2)  %


 


Lymphocytes % (Manual)    7 L  (20-50)  %


 


Monocytes % (Manual)    2  (0-10)  %


 


Platelet Estimate    Normal  (NORMAL)  


 


Anisocytosis (manual)    Slight  


 


PT     (9.8-13.1)  Seconds


 


INR     


 


APTT     (25.6-37.1)  Seconds


 


pCO2     (35-45)  mm/Hg


 


pO2     ()  mm/Hg


 


HCO3     (21-28)  mmol/L


 


ABG pH     (7.35-7.45)  


 


ABG Total CO2     (22-28)  mmol/L


 


ABG O2 Saturation     (95-98)  %


 


ABG Base Excess     (-2.0-3.0)  mmol/L


 


Bruce Test     


 


ABG Potassium     (3.6-5.2)  mmol/L


 


A-a O2 Difference     mm/Hg


 


Sodium   136   (132-148)  mmol/L


 


Chloride   96 L   ()  mmol/L


 


Glucose     ()  mg/dL


 


Lactate     (0.7-2.1)  mmol/L


 


Vent Mode     


 


FiO2     %


 


Blood Gas Comments     


 


Crit Value Called To     


 


Crit Value Called By     


 


Crit Value Read Back     


 


Blood Gas Notified Time     


 


Potassium   8.0 H* D   (3.6-5.0)  MMOL/L


 


Carbon Dioxide   25   (22-30)  mmol/L


 


Anion Gap   23 H   (10-20)  


 


BUN   66 H   (7-17)  mg/dl


 


Creatinine   9.1 H* D   (0.7-1.2)  mg/dl


 


Est GFR ( Amer)   5   


 


Est GFR (Non-Af Amer)   4   


 


POC Glucose (mg/dL)     ()  mg/dL


 


Random Glucose   108 H   ()  mg/dL


 


Calcium   9.0   (8.4-10.2)  mg/dL


 


Phosphorus   6.7 H   (2.5-4.5)  mg/dl


 


Magnesium   2.4 H   (1.6-2.3)  MG/DL


 


Total Bilirubin   1.0   (0.2-1.3)  mg/dl


 


AST   40 H   (14-36)  U/L


 


ALT   27   (9-52)  U/L


 


Alkaline Phosphatase   155 H   ()  U/L


 


Troponin I   0.0600   (0.00-0.120)  ng/mL


 


NT-Pro-B Natriuret Pep   97819 H   (0-900)  pg/ml


 


Total Protein   8.4 H   (6.3-8.2)  G/DL


 


Albumin   4.2   (3.5-5.0)  g/dL


 


Globulin   4.2 H   (2.2-3.9)  gm/dL


 


Albumin/Globulin Ratio   1.0   (1.0-2.1)  


 


Arterial Blood Potassium     (3.6-5.2)  mmol/L


 


Phenytoin  4.1 L    (10-20)  ug/ML














  09/18/18 09/18/18 09/18/18 Range/Units





  11:24 11:24 11:00 


 


WBC     (4.8-10.8)  K/uL


 


RBC     (3.80-5.20)  Mil/uL


 


Hgb     (12.0-16.0)  g/dL


 


Hct     (34.0-47.0)  %


 


MCV     (81.0-99.0)  fl


 


MCH     (27.0-31.0)  pg


 


MCHC     (33.0-37.0)  g/dL


 


RDW     (11.5-14.5)  %


 


Plt Count     (130-400)  K/uL


 


MPV     (7.2-11.7)  fl


 


Neut % (Auto)     (50.0-75.0)  %


 


Lymph % (Auto)     (20.0-40.0)  %


 


Mono % (Auto)     (0.0-10.0)  %


 


Eos % (Auto)     (0.0-4.0)  %


 


Baso % (Auto)     (0.0-2.0)  %


 


Neut # (Auto)     (1.8-7.0)  K/uL


 


Lymph # (Auto)     (1.0-4.3)  K/uL


 


Mono # (Auto)     (0.0-0.8)  K/uL


 


Eos # (Auto)     (0.0-0.7)  K/uL


 


Baso # (Auto)     (0.0-0.2)  K/uL


 


Neutrophils % (Manual)     (42-75)  %


 


Band Neutrophils %     (0-2)  %


 


Lymphocytes % (Manual)     (20-50)  %


 


Monocytes % (Manual)     (0-10)  %


 


Platelet Estimate     (NORMAL)  


 


Anisocytosis (manual)     


 


PT   10.6   (9.8-13.1)  Seconds


 


INR   1.0   


 


APTT   29.6   (25.6-37.1)  Seconds


 


pCO2  38    (35-45)  mm/Hg


 


pO2  108 H    ()  mm/Hg


 


HCO3  27.3    (21-28)  mmol/L


 


ABG pH  7.46 H    (7.35-7.45)  


 


ABG Total CO2  28.2 H    (22-28)  mmol/L


 


ABG O2 Saturation  98.6 H    (95-98)  %


 


ABG Base Excess  3.1 H    (-2.0-3.0)  mmol/L


 


Bruce Test  Yes    


 


ABG Potassium  7.2 H*    (3.6-5.2)  mmol/L


 


A-a O2 Difference  101.0    mm/Hg


 


Sodium  131.0 L    (132-148)  mmol/L


 


Chloride  99.0    ()  mmol/L


 


Glucose  109 H    ()  mg/dL


 


Lactate  0.8    (0.7-2.1)  mmol/L


 


Vent Mode  Nc    


 


FiO2  36.0    %


 


Blood Gas Comments  Nc 4lpm    


 


Crit Value Called To  Daisy garcia    


 


Crit Value Called By  Nadja    


 


Crit Value Read Back  Y    


 


Blood Gas Notified Time  1136    


 


Potassium     (3.6-5.0)  MMOL/L


 


Carbon Dioxide     (22-30)  mmol/L


 


Anion Gap     (10-20)  


 


BUN     (7-17)  mg/dl


 


Creatinine     (0.7-1.2)  mg/dl


 


Est GFR (African Amer)     


 


Est GFR (Non-Af Amer)     


 


POC Glucose (mg/dL)    112 H  ()  mg/dL


 


Random Glucose     ()  mg/dL


 


Calcium     (8.4-10.2)  mg/dL


 


Phosphorus     (2.5-4.5)  mg/dl


 


Magnesium     (1.6-2.3)  MG/DL


 


Total Bilirubin     (0.2-1.3)  mg/dl


 


AST     (14-36)  U/L


 


ALT     (9-52)  U/L


 


Alkaline Phosphatase     ()  U/L


 


Troponin I     (0.00-0.120)  ng/mL


 


NT-Pro-B Natriuret Pep     (0-900)  pg/ml


 


Total Protein     (6.3-8.2)  G/DL


 


Albumin     (3.5-5.0)  g/dL


 


Globulin     (2.2-3.9)  gm/dL


 


Albumin/Globulin Ratio     (1.0-2.1)  


 


Arterial Blood Potassium  7.2 H*    (3.6-5.2)  mmol/L


 


Phenytoin     (10-20)  ug/ML








 Laboratory Results - last 24 hr











  09/18/18 09/18/18 09/18/18





  11:00 11:24 11:24


 


WBC   


 


RBC   


 


Hgb   


 


Hct   


 


MCV   


 


MCH   


 


MCHC   


 


RDW   


 


Plt Count   


 


MPV   


 


Neut % (Auto)   


 


Lymph % (Auto)   


 


Mono % (Auto)   


 


Eos % (Auto)   


 


Baso % (Auto)   


 


Neut # (Auto)   


 


Lymph # (Auto)   


 


Mono # (Auto)   


 


Eos # (Auto)   


 


Baso # (Auto)   


 


Neutrophils % (Manual)   


 


Band Neutrophils %   


 


Lymphocytes % (Manual)   


 


Monocytes % (Manual)   


 


Platelet Estimate   


 


Anisocytosis (manual)   


 


PT   10.6 


 


INR   1.0 


 


APTT   29.6 


 


pCO2    38


 


pO2    108 H


 


HCO3    27.3


 


ABG pH    7.46 H


 


ABG Total CO2    28.2 H


 


ABG O2 Saturation    98.6 H


 


ABG Base Excess    3.1 H


 


Bruce Test    Yes


 


ABG Potassium    7.2 H*


 


A-a O2 Difference    101.0


 


Sodium    131.0 L


 


Chloride    99.0


 


Glucose    109 H


 


Lactate    0.8


 


Vent Mode    Nc


 


FiO2    36.0


 


Blood Gas Comments    Nc 4lpm


 


Crit Value Called To    Daisy garcia


 


Crit Value Called By    Nadja


 


Crit Value Read Back    Y


 


Blood Gas Notified Time    1136


 


Potassium   


 


Carbon Dioxide   


 


Anion Gap   


 


BUN   


 


Creatinine   


 


Est GFR ( Amer)   


 


Est GFR (Non-Af Amer)   


 


POC Glucose (mg/dL)  112 H  


 


Random Glucose   


 


Calcium   


 


Phosphorus   


 


Magnesium   


 


Total Bilirubin   


 


AST   


 


ALT   


 


Alkaline Phosphatase   


 


Troponin I   


 


NT-Pro-B Natriuret Pep   


 


Total Protein   


 


Albumin   


 


Globulin   


 


Albumin/Globulin Ratio   


 


Arterial Blood Potassium    7.2 H*


 


Phenytoin   














  09/18/18 09/18/18 09/18/18





  11:38 11:38 11:38


 


WBC  11.3 H  


 


RBC  4.43  


 


Hgb  12.1  


 


Hct  37.5  


 


MCV  84.6  D  


 


MCH  27.3  


 


MCHC  32.3 L  


 


RDW  17.7 H  


 


Plt Count  217  D  


 


MPV  9.3  


 


Neut % (Auto)  91.4 H  


 


Lymph % (Auto)  5.8 L  


 


Mono % (Auto)  2.3  


 


Eos % (Auto)  0.4  


 


Baso % (Auto)  0.1  


 


Neut # (Auto)  10.4 H  


 


Lymph # (Auto)  0.7 L  


 


Mono # (Auto)  0.3  


 


Eos # (Auto)  0.0  


 


Baso # (Auto)  0.0  


 


Neutrophils % (Manual)  88 H  


 


Band Neutrophils %  3 H  


 


Lymphocytes % (Manual)  7 L  


 


Monocytes % (Manual)  2  


 


Platelet Estimate  Normal  


 


Anisocytosis (manual)  Slight  


 


PT   


 


INR   


 


APTT   


 


pCO2   


 


pO2   


 


HCO3   


 


ABG pH   


 


ABG Total CO2   


 


ABG O2 Saturation   


 


ABG Base Excess   


 


Bruce Test   


 


ABG Potassium   


 


A-a O2 Difference   


 


Sodium   136 


 


Chloride   96 L 


 


Glucose   


 


Lactate   


 


Vent Mode   


 


FiO2   


 


Blood Gas Comments   


 


Crit Value Called To   


 


Crit Value Called By   


 


Crit Value Read Back   


 


Blood Gas Notified Time   


 


Potassium   8.0 H* D 


 


Carbon Dioxide   25 


 


Anion Gap   23 H 


 


BUN   66 H 


 


Creatinine   9.1 H* D 


 


Est GFR ( Amer)   5 


 


Est GFR (Non-Af Amer)   4 


 


POC Glucose (mg/dL)   


 


Random Glucose   108 H 


 


Calcium   9.0 


 


Phosphorus   6.7 H 


 


Magnesium   2.4 H 


 


Total Bilirubin   1.0 


 


AST   40 H 


 


ALT   27 


 


Alkaline Phosphatase   155 H 


 


Troponin I   0.0600 


 


NT-Pro-B Natriuret Pep   58432 H 


 


Total Protein   8.4 H 


 


Albumin   4.2 


 


Globulin   4.2 H 


 


Albumin/Globulin Ratio   1.0 


 


Arterial Blood Potassium   


 


Phenytoin    4.1 L











EKG/Cardiology Studies: 


Cardiology / EKG Studies





09/18/18 11:17


ELECTROCARDIOGRAM Stat 


   Comment: 


   Mode Of Transportation: 


   Reason For Exam: Sepsis Patient





09/18/18 13:38


ELECTROCARDIOGRAM Stat 


   Comment: 


   Mode Of Transportation: 


   Reason For Exam: needed











Fingerstick Blood Sugar Results: 112





Review of Systems





- Review of Systems


Systems not reviewed;Unavailable: Altered Mental Status


All systems: reviewed and no additional remarkable complaints except





- Constitutional


Constitutional: Fever.  absent: Chills, Sweats, Weakness





- Cardiovascular


Cardiovascular: absent: Chest Pain, Chest Pain at Rest, Chest Pain with Activity

, Claudication, Diaphoresis





- Respiratory


Respiratory: absent: Cough, Dyspnea, Hemoptysis, Dyspnea on Exertion, Wheezing





- Gastrointestinal


Gastrointestinal: absent: Abdominal Pain





Critical Care Progress Note





- Nutrition


Nutrition: 


 Nutrition











 Category Date Time Status


 


 Heart Healthy Diet [DIET] Diets  09/18/18 Dinner Active














Assessment/Plan


(1) Respiratory distress


Current Visit: Yes   Status: Acute   Priority: High   Comment: 


Sec to acute pulmonary edema


Emergent HD to obtimize fluid status


Aggressive pulmonary toilet


BD nebs q 6h prn     





(2) Acute pulmonary edema


Current Visit: Yes   Status: Acute   Priority: High   Comment: 


As per above   





(3) Sepsis


Current Visit: Yes   Status: Acute   Priority: High   Comment: 


Patient had fever in the ER, no clear source at this time


Antibiotics Vanco and Zosyn was given


CX-Ray showed Cardiomegaly and pulmonary vascular congestion


ID consulted


Will send blood, urine C/S and cultures   





(4) Hyperkalemia


Current Visit: Yes   Status: Acute   Priority: High   Comment: 


Corrrect hyperkalemia with D50, Insulin, calcium gluconate 


Repeat Labs, EKG, lactic acid, Calcium, Mag, phosphate Post HD   





(5) ESRD needing dialysis


Current Visit: No   Status: Acute   Priority: High - - -

## 2019-04-16 NOTE — ED ADULT NURSE NOTE - NSIMPLEMENTINTERV_GEN_ALL_ED
Implemented All Fall with Harm Risk Interventions:  Floyd to call system. Call bell, personal items and telephone within reach. Instruct patient to call for assistance. Room bathroom lighting operational. Non-slip footwear when patient is off stretcher. Physically safe environment: no spills, clutter or unnecessary equipment. Stretcher in lowest position, wheels locked, appropriate side rails in place. Provide visual cue, wrist band, yellow gown, etc. Monitor gait and stability. Monitor for mental status changes and reorient to person, place, and time. Review medications for side effects contributing to fall risk. Reinforce activity limits and safety measures with patient and family. Provide visual clues: red socks.

## 2019-04-16 NOTE — H&P ADULT - NSHPPHYSICALEXAM_GEN_ALL_CORE
Vital Signs Last 24 Hrs  T(C): 36.6 (16 Apr 2019 22:02), Max: 36.7 (16 Apr 2019 08:50)  T(F): 97.8 (16 Apr 2019 22:02), Max: 98 (16 Apr 2019 08:50)  HR: 86 (16 Apr 2019 22:02) (70 - 89)  BP: 107/50 (16 Apr 2019 22:02) (107/50 - 168/91)  BP(mean): --  RR: 17 (16 Apr 2019 21:54) (17 - 18)  SpO2: 99% (16 Apr 2019 21:54) (95% - 100%)    GENERAL: No acute distress, well-developed  ENT: EOMI, PERRL, conjunctiva and sclera clear, Neck supple, No JVD, moist mucosa  CHEST/LUNG: Clear to auscultation bilaterally; No wheeze, equal breath sounds bilaterally   BACK: No spinal tenderness  HEART: Regular rate and rhythm; No murmurs, rubs, or gallops  ABDOMEN: Soft, Nontender, Nondistended; Bowel sounds present  EXTREMITIES: +DP/PT pulses b/l, No clubbing, cyanosis, or edema, no erythema, warmth, or TTP of feet  PSYCH: Nl behavior, nl affect  NEUROLOGY: AAOx3, non-focal  SKIN: Normal color, No rashes or lesions

## 2019-04-16 NOTE — H&P ADULT - PROBLEM SELECTOR PLAN 1
- During her last visit her b/l foot pain was believed to be multifactorial 2/2 OQ of feet with DM neuropathy given her long standing neuropathy, given current findings of multilevel PAD her pain is therefore likely 2/2 poor blood flow  - For now will d/c gabapentin as its unlikely to give her any benefit, will place patient on cilostazole and monitor  - Tylenol prn for pain, will try to limit opioids in this elderly, frail woman - During her last visit her b/l foot pain was believed to be multifactorial 2/2 OQ of feet with DM neuropathy given her long standing neuropathy, given current findings of multilevel PAD her pain is therefore likely 2/2 poor blood flow  - will place patient on cilostazol (no history of heart failure) and monitor  - Tylenol prn for pain, will try to limit opioids in this elderly, frail woman  - c/w gabapentin

## 2019-04-16 NOTE — ED PROVIDER NOTE - ATTENDING CONTRIBUTION TO CARE
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  Pt declined subacute rehab during recent admission.  On exam pt chronically-ill appearing but in NAD when laying at rest, heart RRR, lungs CTAB, abd NTND, extremities without swelling, strength 5/5 in all extremities when laying in bed and skin without rash.  Both feet overall normal appearing but unable to palpate DP pulses in both feet and both feet extremely painful with light touch to both feet.

## 2019-04-16 NOTE — PATIENT PROFILE ADULT - FALL HARM RISK
age(85 years old or older)/bones(Osteoporosis,prev fx,steroid use,metastatic bone ca)/other/fall hx, weakness

## 2019-04-16 NOTE — ED ADULT NURSE NOTE - PMH
DM (diabetes mellitus)    HLD (hyperlipidemia)    HTN (hypertension)    OA (osteoarthritis)    Stage III chronic kidney disease

## 2019-04-16 NOTE — H&P ADULT - PROBLEM SELECTOR PLAN 3
- On levemir 10U BID, states that her appetite has been poor, will therefore place on levemir 5U BID for now  - Seen by nutritionist on her last hospitalization, recommended to have glucerna ginakes BID, will therefore order for that

## 2019-04-16 NOTE — ED ADULT NURSE NOTE - OBJECTIVE STATEMENT
89 year old female with PMH of DM and HTN presents to the ER with c/o b/l foot pain x several months and unable to walk due to the pain in her feet x several days. Right foot reddened and left foot is dark red, constant pain, worse when touched Pt is alert and oriented to name and , lives alone and ambulates with a cane.   PIV placed and labs drawn Plan of care discussed with pt and son

## 2019-04-16 NOTE — ED PROVIDER NOTE - CLINICAL SUMMARY MEDICAL DECISION MAKING FREE TEXT BOX
89F with b/l foot pain description similar to neuropathy however nonpalpable or dopplerable pulses b/l. vasc sx eval, labs, pain control

## 2019-04-17 DIAGNOSIS — E78.5 HYPERLIPIDEMIA, UNSPECIFIED: ICD-10-CM

## 2019-04-17 DIAGNOSIS — N18.3 CHRONIC KIDNEY DISEASE, STAGE 3 (MODERATE): ICD-10-CM

## 2019-04-17 DIAGNOSIS — I10 ESSENTIAL (PRIMARY) HYPERTENSION: ICD-10-CM

## 2019-04-17 DIAGNOSIS — E11.65 TYPE 2 DIABETES MELLITUS WITH HYPERGLYCEMIA: ICD-10-CM

## 2019-04-17 DIAGNOSIS — M79.671 PAIN IN RIGHT FOOT: ICD-10-CM

## 2019-04-17 DIAGNOSIS — Z29.9 ENCOUNTER FOR PROPHYLACTIC MEASURES, UNSPECIFIED: ICD-10-CM

## 2019-04-17 DIAGNOSIS — I73.9 PERIPHERAL VASCULAR DISEASE, UNSPECIFIED: ICD-10-CM

## 2019-04-17 LAB
ANION GAP SERPL CALC-SCNC: 12 MMO/L — SIGNIFICANT CHANGE UP (ref 7–14)
ANION GAP SERPL CALC-SCNC: 14 MMO/L — SIGNIFICANT CHANGE UP (ref 7–14)
BUN SERPL-MCNC: 14 MG/DL — SIGNIFICANT CHANGE UP (ref 7–23)
BUN SERPL-MCNC: 16 MG/DL — SIGNIFICANT CHANGE UP (ref 7–23)
CALCIUM SERPL-MCNC: 8.5 MG/DL — SIGNIFICANT CHANGE UP (ref 8.4–10.5)
CALCIUM SERPL-MCNC: 8.6 MG/DL — SIGNIFICANT CHANGE UP (ref 8.4–10.5)
CHLORIDE SERPL-SCNC: 102 MMOL/L — SIGNIFICANT CHANGE UP (ref 98–107)
CHLORIDE SERPL-SCNC: 104 MMOL/L — SIGNIFICANT CHANGE UP (ref 98–107)
CHOLEST SERPL-MCNC: 131 MG/DL — SIGNIFICANT CHANGE UP (ref 120–199)
CO2 SERPL-SCNC: 24 MMOL/L — SIGNIFICANT CHANGE UP (ref 22–31)
CO2 SERPL-SCNC: 24 MMOL/L — SIGNIFICANT CHANGE UP (ref 22–31)
CREAT SERPL-MCNC: 1.11 MG/DL — SIGNIFICANT CHANGE UP (ref 0.5–1.3)
CREAT SERPL-MCNC: 1.18 MG/DL — SIGNIFICANT CHANGE UP (ref 0.5–1.3)
GLUCOSE BLDC GLUCOMTR-MCNC: 175 MG/DL — HIGH (ref 70–99)
GLUCOSE BLDC GLUCOMTR-MCNC: 179 MG/DL — HIGH (ref 70–99)
GLUCOSE BLDC GLUCOMTR-MCNC: 191 MG/DL — HIGH (ref 70–99)
GLUCOSE BLDC GLUCOMTR-MCNC: 221 MG/DL — HIGH (ref 70–99)
GLUCOSE BLDC GLUCOMTR-MCNC: 225 MG/DL — HIGH (ref 70–99)
GLUCOSE SERPL-MCNC: 164 MG/DL — HIGH (ref 70–99)
GLUCOSE SERPL-MCNC: 204 MG/DL — HIGH (ref 70–99)
HCT VFR BLD CALC: 32.8 % — LOW (ref 34.5–45)
HDLC SERPL-MCNC: 56 MG/DL — SIGNIFICANT CHANGE UP (ref 45–65)
HGB BLD-MCNC: 10.5 G/DL — LOW (ref 11.5–15.5)
LACTATE SERPL-SCNC: 0.9 MMOL/L — SIGNIFICANT CHANGE UP (ref 0.5–2)
LIPID PNL WITH DIRECT LDL SERPL: 72 MG/DL — SIGNIFICANT CHANGE UP
MAGNESIUM SERPL-MCNC: 1 MG/DL — CRITICAL LOW (ref 1.6–2.6)
MAGNESIUM SERPL-MCNC: 2.2 MG/DL — SIGNIFICANT CHANGE UP (ref 1.6–2.6)
MCHC RBC-ENTMCNC: 28.9 PG — SIGNIFICANT CHANGE UP (ref 27–34)
MCHC RBC-ENTMCNC: 32 % — SIGNIFICANT CHANGE UP (ref 32–36)
MCV RBC AUTO: 90.4 FL — SIGNIFICANT CHANGE UP (ref 80–100)
NRBC # FLD: 0 K/UL — SIGNIFICANT CHANGE UP (ref 0–0)
PHOSPHATE SERPL-MCNC: 2.9 MG/DL — SIGNIFICANT CHANGE UP (ref 2.5–4.5)
PHOSPHATE SERPL-MCNC: 3.4 MG/DL — SIGNIFICANT CHANGE UP (ref 2.5–4.5)
PLATELET # BLD AUTO: 249 K/UL — SIGNIFICANT CHANGE UP (ref 150–400)
PMV BLD: 10.2 FL — SIGNIFICANT CHANGE UP (ref 7–13)
POTASSIUM SERPL-MCNC: 4.3 MMOL/L — SIGNIFICANT CHANGE UP (ref 3.5–5.3)
POTASSIUM SERPL-MCNC: 5.1 MMOL/L — SIGNIFICANT CHANGE UP (ref 3.5–5.3)
POTASSIUM SERPL-SCNC: 4.3 MMOL/L — SIGNIFICANT CHANGE UP (ref 3.5–5.3)
POTASSIUM SERPL-SCNC: 5.1 MMOL/L — SIGNIFICANT CHANGE UP (ref 3.5–5.3)
RBC # BLD: 3.63 M/UL — LOW (ref 3.8–5.2)
RBC # FLD: 15.4 % — HIGH (ref 10.3–14.5)
SODIUM SERPL-SCNC: 140 MMOL/L — SIGNIFICANT CHANGE UP (ref 135–145)
SODIUM SERPL-SCNC: 140 MMOL/L — SIGNIFICANT CHANGE UP (ref 135–145)
TRIGL SERPL-MCNC: 81 MG/DL — SIGNIFICANT CHANGE UP (ref 10–149)
WBC # BLD: 5.8 K/UL — SIGNIFICANT CHANGE UP (ref 3.8–10.5)
WBC # FLD AUTO: 5.8 K/UL — SIGNIFICANT CHANGE UP (ref 3.8–10.5)

## 2019-04-17 RX ORDER — SODIUM CHLORIDE 9 MG/ML
250 INJECTION INTRAMUSCULAR; INTRAVENOUS; SUBCUTANEOUS ONCE
Qty: 0 | Refills: 0 | Status: COMPLETED | OUTPATIENT
Start: 2019-04-17 | End: 2019-04-17

## 2019-04-17 RX ORDER — MORPHINE SULFATE 50 MG/1
2 CAPSULE, EXTENDED RELEASE ORAL ONCE
Qty: 0 | Refills: 0 | Status: DISCONTINUED | OUTPATIENT
Start: 2019-04-17 | End: 2019-04-17

## 2019-04-17 RX ORDER — ATORVASTATIN CALCIUM 80 MG/1
40 TABLET, FILM COATED ORAL AT BEDTIME
Qty: 0 | Refills: 0 | Status: DISCONTINUED | OUTPATIENT
Start: 2019-04-17 | End: 2019-04-17

## 2019-04-17 RX ORDER — MAGNESIUM SULFATE 500 MG/ML
2 VIAL (ML) INJECTION ONCE
Qty: 0 | Refills: 0 | Status: COMPLETED | OUTPATIENT
Start: 2019-04-17 | End: 2019-04-17

## 2019-04-17 RX ORDER — MAGNESIUM SULFATE 500 MG/ML
2 VIAL (ML) INJECTION
Qty: 0 | Refills: 0 | Status: DISCONTINUED | OUTPATIENT
Start: 2019-04-17 | End: 2019-04-17

## 2019-04-17 RX ORDER — GABAPENTIN 400 MG/1
100 CAPSULE ORAL DAILY
Qty: 0 | Refills: 0 | Status: DISCONTINUED | OUTPATIENT
Start: 2019-04-17 | End: 2019-04-23

## 2019-04-17 RX ORDER — MAGNESIUM OXIDE 400 MG ORAL TABLET 241.3 MG
400 TABLET ORAL
Qty: 0 | Refills: 0 | Status: DISCONTINUED | OUTPATIENT
Start: 2019-04-17 | End: 2019-04-18

## 2019-04-17 RX ORDER — ATORVASTATIN CALCIUM 80 MG/1
10 TABLET, FILM COATED ORAL AT BEDTIME
Qty: 0 | Refills: 0 | Status: DISCONTINUED | OUTPATIENT
Start: 2019-04-17 | End: 2019-04-23

## 2019-04-17 RX ADMIN — Medication 25 MILLIGRAM(S): at 17:55

## 2019-04-17 RX ADMIN — HEPARIN SODIUM 5000 UNIT(S): 5000 INJECTION INTRAVENOUS; SUBCUTANEOUS at 22:22

## 2019-04-17 RX ADMIN — CILOSTAZOL 100 MILLIGRAM(S): 100 TABLET ORAL at 18:10

## 2019-04-17 RX ADMIN — Medication 1: at 17:03

## 2019-04-17 RX ADMIN — LISINOPRIL 5 MILLIGRAM(S): 2.5 TABLET ORAL at 05:51

## 2019-04-17 RX ADMIN — HEPARIN SODIUM 5000 UNIT(S): 5000 INJECTION INTRAVENOUS; SUBCUTANEOUS at 05:50

## 2019-04-17 RX ADMIN — ATORVASTATIN CALCIUM 10 MILLIGRAM(S): 80 TABLET, FILM COATED ORAL at 22:22

## 2019-04-17 RX ADMIN — Medication 5 UNIT(S): at 20:35

## 2019-04-17 RX ADMIN — Medication 25 MILLIGRAM(S): at 05:51

## 2019-04-17 RX ADMIN — MAGNESIUM OXIDE 400 MG ORAL TABLET 400 MILLIGRAM(S): 241.3 TABLET ORAL at 17:55

## 2019-04-17 RX ADMIN — Medication 50 GRAM(S): at 12:25

## 2019-04-17 RX ADMIN — Medication 5 UNIT(S): at 08:17

## 2019-04-17 RX ADMIN — HEPARIN SODIUM 5000 UNIT(S): 5000 INJECTION INTRAVENOUS; SUBCUTANEOUS at 14:23

## 2019-04-17 RX ADMIN — MAGNESIUM OXIDE 400 MG ORAL TABLET 400 MILLIGRAM(S): 241.3 TABLET ORAL at 11:57

## 2019-04-17 RX ADMIN — Medication 50 GRAM(S): at 08:49

## 2019-04-17 RX ADMIN — CILOSTAZOL 100 MILLIGRAM(S): 100 TABLET ORAL at 05:50

## 2019-04-17 RX ADMIN — Medication 1: at 07:48

## 2019-04-17 RX ADMIN — SODIUM CHLORIDE 1000 MILLILITER(S): 9 INJECTION INTRAMUSCULAR; INTRAVENOUS; SUBCUTANEOUS at 02:53

## 2019-04-17 RX ADMIN — GABAPENTIN 100 MILLIGRAM(S): 400 CAPSULE ORAL at 11:57

## 2019-04-17 RX ADMIN — Medication 650 MILLIGRAM(S): at 05:51

## 2019-04-17 RX ADMIN — Medication 1: at 11:57

## 2019-04-17 NOTE — PROGRESS NOTE ADULT - SUBJECTIVE AND OBJECTIVE BOX
INTERVAL HPI/OVERNIGHT EVENTS: Seen earlier today. My feet yaritza left hurting   Vital Signs Last 24 Hrs  T(C): 36.7 (17 Apr 2019 05:47), Max: 36.7 (17 Apr 2019 05:47)  T(F): 98 (17 Apr 2019 05:47), Max: 98 (17 Apr 2019 05:47)  HR: 80 (17 Apr 2019 05:47) (80 - 86)  BP: 134/72 (17 Apr 2019 05:47) (98/59 - 168/91)  BP(mean): --  RR: 17 (17 Apr 2019 05:47) (17 - 18)  SpO2: 98% (17 Apr 2019 05:47) (96% - 99%)  I&O's Summary    MEDICATIONS  (STANDING):  atorvastatin 10 milliGRAM(s) Oral at bedtime  cilostazol 100 milliGRAM(s) Oral two times a day  dextrose 5%. 1000 milliLiter(s) (50 mL/Hr) IV Continuous <Continuous>  dextrose 50% Injectable 12.5 Gram(s) IV Push once  dextrose 50% Injectable 25 Gram(s) IV Push once  dextrose 50% Injectable 25 Gram(s) IV Push once  gabapentin 100 milliGRAM(s) Oral daily  heparin  Injectable 5000 Unit(s) SubCutaneous every 8 hours  insulin detemir injectable (LEVEMIR) 5 Unit(s) SubCutaneous two times a day  insulin lispro (HumaLOG) corrective regimen sliding scale   SubCutaneous three times a day before meals  insulin lispro (HumaLOG) corrective regimen sliding scale   SubCutaneous at bedtime  lisinopril 5 milliGRAM(s) Oral daily  magnesium oxide 400 milliGRAM(s) Oral three times a day with meals  metoprolol tartrate 25 milliGRAM(s) Oral two times a day    MEDICATIONS  (PRN):  acetaminophen   Tablet .. 650 milliGRAM(s) Oral every 6 hours PRN Moderate Pain (4 - 6)  dextrose 40% Gel 15 Gram(s) Oral once PRN Blood Glucose LESS THAN 70 milliGRAM(s)/deciliter  glucagon  Injectable 1 milliGRAM(s) IntraMuscular once PRN Glucose LESS THAN 70 milligrams/deciliter    LABS:                        10.5   5.80  )-----------( 249      ( 17 Apr 2019 06:00 )             32.8     04-17    140  |  102  |  16  ----------------------------<  204<H>  5.1   |  24  |  1.18    Ca    8.6      17 Apr 2019 14:00  Phos  3.4     04-17  Mg     2.2     04-17    TPro  7.3  /  Alb  3.9  /  TBili  0.7  /  DBili  x   /  AST  17  /  ALT  13  /  AlkPhos  95  04-16    PT/INR - ( 16 Apr 2019 09:20 )   PT: 11.5 SEC;   INR: 1.03          PTT - ( 16 Apr 2019 09:20 )  PTT:28.5 SEC    CAPILLARY BLOOD GLUCOSE      POCT Blood Glucose.: 191 mg/dL (17 Apr 2019 11:43)  POCT Blood Glucose.: 175 mg/dL (17 Apr 2019 07:40)  POCT Blood Glucose.: 203 mg/dL (16 Apr 2019 22:21)          REVIEW OF SYSTEMS:  CONSTITUTIONAL: No fever, weight loss, or fatigue  RESPIRATORY: No cough, wheezing, chills or hemoptysis; No shortness of breath  CARDIOVASCULAR: No chest pain, palpitations, dizziness, or leg swelling  GASTROINTESTINAL: No abdominal or epigastric pain. No nausea, vomiting, or hematemesis; No diarrhea or constipation. No melena or hematochezia.  GENITOURINARY: No dysuria, frequency, hematuria, or incontinence  NEUROLOGICAL: No headaches, memory loss, loss of strength, numbness, or tremors  MUSCULOSKELETAL: feet pain     Consultant(s) Notes Reviewed:  [x ] YES  [ ] NO    PHYSICAL EXAM:  GENERAL: NAD, well-groomed, well-developed, not in any distress ,  HEAD:  Atraumatic, Normocephalic  NECK: Supple, No JVD, Normal thyroid  NERVOUS SYSTEM:  Alert & Oriented X3, No focal deficit   CHEST/LUNG: Good air entry bilateral with no  rales, rhonchi, wheezing, or rubs  HEART: Regular rate and rhythm; No murmurs, rubs, or gallops  ABDOMEN: Soft, Nontender, Nondistended; Bowel sounds present  EXTREMITIES:  tender to touch both feet but warm     Care Discussed with Consultants/Other Providers [ x] YES  [ ] NO

## 2019-04-17 NOTE — PHYSICAL THERAPY INITIAL EVALUATION ADULT - ADDITIONAL COMMENTS
Pt. recently hospitalized with dec. Mg+, was d/c home.  Now returns with increased b/l feet pain, awaiting vascular w/u

## 2019-04-17 NOTE — CONSULT NOTE ADULT - SUBJECTIVE AND OBJECTIVE BOX
NEPHROLOGY - [facility] (formerly known as [facility])    Patient seen and examined.    HPI: 89F w/ DM, HTN, HLD, OA, PAD, hypomagnesemia, CKD3 p/w b/l foot pain L>R (4/16) from home. Pt is somewhat of a poor historian.  Recently hospitalized for similar complaints of pain c/b hypomagnesemia. Her serum magnesium at discharge was 1.6 on 4/11. Pt endorses pain is controlled this AM, but at times the pain is 10/10 unrelieved by certain meds.  JAMIE study reveals multilevel PAD of bilateral legs. This admission she was started on cilostazol. Her blood pressure is controlled on ACE. She has CKD stage 3 likely due to DM/HTN with a baseline Cr ~ 1.1. Denies NSAID use ( though again; she is forgetful) or CAM. No changes in urine output. Fair appetite. Denies CP, SOB, abdominal pain, N/V/D, dysuria, hematuria, fever or chills. Her lactate was elevated (2.2) on admission; now resolved. Nephrology was consulted for CKD/hypomagnesemia.     PAST MEDICAL & SURGICAL HISTORY:  Stage III chronic kidney disease  OA (osteoarthritis)  DM (diabetes mellitus)  HLD (hyperlipidemia)  HTN (hypertension)  H/O eye surgery  H/O bilateral cataract extraction  S/P appendectomy: w/ ileocolic resection    MEDICATIONS  (STANDING):  atorvastatin 10 milliGRAM(s) Oral at bedtime  cilostazol 100 milliGRAM(s) Oral two times a day  gabapentin 100 milliGRAM(s) Oral daily  heparin  Injectable 5000 Unit(s) SubCutaneous every 8 hours  insulin lispro (HumaLOG) corrective regimen sliding scale   SubCutaneous three times a day before meals  lisinopril 5 milliGRAM(s) Oral daily  magnesium oxide 400 milliGRAM(s) Oral three times a day with meals  magnesium sulfate  IVPB 2 Gram(s) IV Intermittent once  metoprolol tartrate 25 milliGRAM(s) Oral two times a day    ALLERGIES:  No Known Allergies    SOCIAL HISTORY:  Denies alcohol abuse, drug abuse or tobacco usage.     FAMILY HISTORY:  Family history of diabetes mellitus (Child): in son and daughter    VITALS:  T(C): 36.7 (04-17-19 @ 05:47), Max: 36.7 (04-17-19 @ 05:47)  HR: 80 (04-17-19 @ 05:47) (70 - 86)  BP: 134/72 (04-17-19 @ 05:47) (98/59 - 168/91)  RR: 17 (04-17-19 @ 05:47) (17 - 18)  SpO2: 98% (04-17-19 @ 05:47) (95% - 99%)    REVIEW OF SYSTEMS:  Denies any nausea, vomiting, diarrhea, fever or chills. Denies chest pain, SOB, focal weakness, hematuria or dysuria. Fair oral intake and denies fatigue or weakness. All other pertinent systems are reviewed and are negative.    PHYSICAL EXAM:  Constitutional: NAD  HEENT: EOMI  Neck:  No JVD, supple   Respiratory: CTA B/L  Cardiovascular: S1 and S2, RRR  Gastrointestinal: + BS, soft, NT, ND  Extremities: No peripheral edema, + tender left foot  Neurological: forgetful  Psychiatric: Normal mood, normal affect  : No Mccray  Skin: No rashes, C/D/I  Access: Not applicable    LABS:                        10.5   5.80  )-----------( 249      ( 17 Apr 2019 06:00 )             32.8     04-17    140  |  104  |  14  ----------------------------<  164<H>  4.3   |  24  |  1.11    Ca    8.5      17 Apr 2019 06:00  Phos  2.9     04-17  Mg     1.0     04-17    TPro  7.3  /  Alb  3.9  /  TBili  0.7  /  DBili  x   /  AST  17  /  ALT  13  /  AlkPhos  95  04-16    ASSESSMENT  89F w/ DM, HTN, HLD, OA, PAD, hypomagnesemia, CKD3 p/w b/l foot pain L>R (4/16)  - CKD: stage 3 due to DM/HTN. Renal fxn at baseline  - HTN: BP controlled  - PAD: b/l LE, painful  - hypomagnesemia    RECOMMEND  - mag sulfate 2 gm IV x 2 doses as given  - mag ox 400 mg po tid  - repeat Mg at 1600  - check urine protein creatinine ratio  - c/w lisinopril    Franchesca Zee NP  [facility]  ([facility], )  [phone] NEPHROLOGY - [facility] (formerly known as [facility])    Patient seen and examined.    HPI: 89F w/ DM, HTN, HLD, OA, PAD, hypomagnesemia, CKD3 p/w b/l foot pain L>R (4/16) from home. Pt is somewhat of a poor historian.  Recently hospitalized for similar complaints of pain c/b hypomagnesemia. Her serum magnesium at discharge was 1.6 on 4/11. Pt endorses pain is controlled this AM, but at times the pain is 10/10 unrelieved by certain meds.  JAMIE study reveals multilevel PAD of bilateral legs. This admission she was started on cilostazol. Her blood pressure is controlled on ACE. She has CKD stage 3 likely due to DM/HTN with a baseline Cr ~ 1.1. Denies NSAID use ( though again; she is forgetful) or CAM. No changes in urine output. Fair appetite. Denies CP, SOB, abdominal pain, N/V/D, dysuria, hematuria, fever or chills. Her lactate was elevated (2.2) on admission; now resolved. Nephrology was consulted for CKD/hypomagnesemia.   She is a poor historian for sure       PAST MEDICAL & SURGICAL HISTORY:  Stage III chronic kidney disease  OA (osteoarthritis)  DM (diabetes mellitus)  HLD (hyperlipidemia)  HTN (hypertension)  H/O eye surgery  H/O bilateral cataract extraction  S/P appendectomy: w/ ileocolic resection    MEDICATIONS  (STANDING):  atorvastatin 10 milliGRAM(s) Oral at bedtime  cilostazol 100 milliGRAM(s) Oral two times a day  gabapentin 100 milliGRAM(s) Oral daily  heparin  Injectable 5000 Unit(s) SubCutaneous every 8 hours  insulin lispro (HumaLOG) corrective regimen sliding scale   SubCutaneous three times a day before meals  lisinopril 5 milliGRAM(s) Oral daily  magnesium oxide 400 milliGRAM(s) Oral three times a day with meals  magnesium sulfate  IVPB 2 Gram(s) IV Intermittent once  metoprolol tartrate 25 milliGRAM(s) Oral two times a day    ALLERGIES:  No Known Allergies    SOCIAL HISTORY:  Denies alcohol abuse, drug abuse or tobacco usage.     FAMILY HISTORY:  Family history of diabetes mellitus (Child): in son and daughter    VITALS:  T(C): 36.7 (04-17-19 @ 05:47), Max: 36.7 (04-17-19 @ 05:47)  HR: 80 (04-17-19 @ 05:47) (70 - 86)  BP: 134/72 (04-17-19 @ 05:47) (98/59 - 168/91)  RR: 17 (04-17-19 @ 05:47) (17 - 18)  SpO2: 98% (04-17-19 @ 05:47) (95% - 99%)    REVIEW OF SYSTEMS:  Denies any nausea, vomiting, diarrhea, fever or chills. Denies chest pain, SOB, focal weakness, hematuria or dysuria. Fair oral intake and denies fatigue or weakness. All other pertinent systems are reviewed and are negative.    PHYSICAL EXAM:  Constitutional: NAD  HEENT: EOMI  Neck:  No JVD, supple   Respiratory: CTA B/L  Cardiovascular: S1 and S2, RRR  Gastrointestinal: + BS, soft, NT, ND  Extremities: No peripheral edema, + tender left foot  Neurological: forgetful  Psychiatric: Normal mood, normal affect  : No Mccray  Skin: No rashes, C/D/I  Access: Not applicable    LABS:                        10.5   5.80  )-----------( 249      ( 17 Apr 2019 06:00 )             32.8     04-17    140  |  104  |  14  ----------------------------<  164<H>  4.3   |  24  |  1.11    Ca    8.5      17 Apr 2019 06:00  Phos  2.9     04-17  Mg     1.0     04-17    TPro  7.3  /  Alb  3.9  /  TBili  0.7  /  DBili  x   /  AST  17  /  ALT  13  /  AlkPhos  95  04-16    ASSESSMENT  89F w/ DM, HTN, HLD, OA, PAD, hypomagnesemia, CKD3 p/w b/l foot pain L>R (4/16)  - CKD: stage 3 due to DM/HTN. Renal fxn at baseline  - HTN: BP controlled  - PAD: b/l LE, painful  - hypomagnesemia    RECOMMEND  - mag sulfate 2 gm IV x 2 doses as given  - mag ox 400 mg po tid  - repeat Mg at 1600  - check urine protein creatinine ratio  - c/w lisinopril    Franchesca Zee NP  [facility]  ([facility], )  [phone]

## 2019-04-17 NOTE — CONSULT NOTE ADULT - SUBJECTIVE AND OBJECTIVE BOX
HPI:  89 y/o female with history of DM2, CKD 3, HTN, HLD, and OA who presents to the hospital with complaints of persistent b/l feet pain. Said that the pain has been ongoing for a long time and is unrelenting. Said that the pain comes and goes and denies it being exacerbated by walking. Said that she was recently given a new medication to help with the pain (gabapentin) but denies that medication improving the pain. Said that she has become more bed bound due to the pain. Patient was recently hospitalized from 4/9 - 4/11 for the same foot pain and hypomagnesemia. She was seen by PT during that admission and was recommended to go to [facility] but she and her son deferred against that and she instead went back home. Her hypomagnesemia was also repleted and was wnl on her discharge. Patient had JAMIE study done that showed that she had multilevel PAD of b/l legs. Cardiology consulted for PAD/HTN management. Patient denies having outpatient cardiologist. Denies hx MI/cardiac surgery. Denies chest pain, SOB, orthopnea, syncope, LE edema, dizziness, palpitations.      PAST MEDICAL & SURGICAL HISTORY:  Stage III chronic kidney disease  OA (osteoarthritis)  DM (diabetes mellitus)  HLD (hyperlipidemia)  HTN (hypertension)  H/O eye surgery: L &quot;retina&quot; surgery  H/O bilateral cataract extraction  S/P appendectomy: w/ ileocolic resection        MEDICATIONS  (STANDING):  atorvastatin 10 milliGRAM(s) Oral at bedtime  cilostazol 100 milliGRAM(s) Oral two times a day  dextrose 5%. 1000 milliLiter(s) (50 mL/Hr) IV Continuous <Continuous>  dextrose 50% Injectable 12.5 Gram(s) IV Push once  dextrose 50% Injectable 25 Gram(s) IV Push once  dextrose 50% Injectable 25 Gram(s) IV Push once  gabapentin 100 milliGRAM(s) Oral daily  heparin  Injectable 5000 Unit(s) SubCutaneous every 8 hours  insulin detemir injectable (LEVEMIR) 5 Unit(s) SubCutaneous two times a day  insulin lispro (HumaLOG) corrective regimen sliding scale   SubCutaneous three times a day before meals  insulin lispro (HumaLOG) corrective regimen sliding scale   SubCutaneous at bedtime  lisinopril 5 milliGRAM(s) Oral daily  magnesium oxide 400 milliGRAM(s) Oral three times a day with meals  metoprolol tartrate 25 milliGRAM(s) Oral two times a day      Allergies    No Known Allergies    Intolerances    FAMILY HISTORY:  Family history of diabetes mellitus (Child): in son and daughter    Noncontributory for premature coronary disease or sudden cardiac death    SOCIAL HISTORY:    [x ] Non-smoker  [ ] Smoker  [ ] Alcohol      REVIEW OF SYSTEMS:  [ ]chest pain  [  ]shortness of breath  [  ]palpitations  [  ]syncope  [ ]near syncope [ ]upper extremity weakness   [ ] lower extremity weakness  [  ]diplopia  [  ]altered mental status   [  ]fevers  [ ]chills [ ]nausea  [ ]vomitting  [  ]dysphagia    [ ]abdominal pain  [ ]melena  [ ]BRBPR    [  ]epistaxis  [  ]rash  [ ]lower extremity edema [x  ] Lower extremity pain     [x ] All others negative	  [ ] Unable to obtain    PHYSICAL EXAM:  T(C): 36.7 (04-17-19 @ 05:47), Max: 36.7 (04-17-19 @ 05:47)  HR: 80 (04-17-19 @ 05:47) (80 - 86)  BP: 134/72 (04-17-19 @ 05:47) (98/59 - 168/91)  RR: 17 (04-17-19 @ 05:47) (17 - 18)  SpO2: 98% (04-17-19 @ 05:47) (96% - 99%)  Wt(kg): --    Appearance: Normal	  HEENT:   Normal oral mucosa, PERRL, EOMI	  Lymphatic: No lymphadenopathy , no edema  Cardiovascular: Normal S1 S2, No JVD, No murmurs, +DP/PT pulses b/l  Respiratory: Lungs clear to auscultation, normal effort 	  Gastrointestinal:  Soft, Non-tender, + BS	  Skin: No rashes, No ecchymoses, No cyanosis, warm to touch  Musculoskeletal: Normal range of motion, normal strength  Psychiatry:  Mood & affect appropriate    DIAGNOSTIC DATA:    ECG: Pending    < from: Transthoracic Echocardiogram (10.31.18 @ 07:42) >  CONCLUSIONS:  1. Normal left ventricular internal dimensions and wall  thicknesses.  2. Normal left ventricular systolic function. No segmental  wall motion abnormalities.  3. Normal right ventricular size and function.  4. Normal tricuspid valve. Minimal tricuspid regurgitation.  *** Compared with echocardiogram of 10/19/2016, no  significant changes noted.    < end of copied text >    < from: Nuclear Stress Test-Pharmacologic (03.07.17 @ 10:30) >  IMPRESSIONS:  * No ECG evidence of ischemia  * Myocardial Perfusion SPECT results are abnormal.  * There is a medium sized, mild defect in anterior wall  that is partially reversible, suggestive of mild ischemia.  * The left ventricle was normal LV size.  * Post-stress resting myocardial perfusion gated SPECT  imaging was performed (LVEF > 70%;LVEDV = 47 ml.) and  showed normal wall motion.          *** Conclusions ***  There is SPECT evidence of mild ischemia during  Regadenoson  pharmacologic stress in the distribution of  LAD.    < end of copied text >      LABS:	 	                            10.5   5.80  )-----------( 249      ( 17 Apr 2019 06:00 )             32.8     04-17    140  |  104  |  14  ----------------------------<  164<H>  4.3   |  24  |  1.11    Ca    8.5      17 Apr 2019 06:00  Phos  2.9     04-17  Mg     1.0     04-17    TPro  7.3  /  Alb  3.9  /  TBili  0.7  /  DBili  x   /  AST  17  /  ALT  13  /  AlkPhos  95  04-16      ASSESSMENT/PLAN: 89 y/o female with history of DM2, CKD 3, HTN, HLD, and OA who presents to the hospital with complaints of persistent b/l feet pain. Patient had JAMIE study done that showed that she had multilevel PAD of b/l legs. Cardiology consulted for PAD/HTN management.     -Not in clinical heart failure and no anginal symptoms  -Last TTE 10/31/18 noted above - normal LV systolic function  -Last Stress test in 2017 - noted to have mild anterior wall ischemia but was medically managed - however patient reports was taken off Aspirin due to her recurrent falls - Continue BB/Statin  -BP stable - continue current BP meds  -Follow up EKG  -Replenish Magnesium per renal  -Follow up vascular plan regarding PAD  -Patient optimized from CV perspective for LE Angiogram

## 2019-04-17 NOTE — CONSULT NOTE ADULT - SUBJECTIVE AND OBJECTIVE BOX
General Surgery Consult  Consulting surgical team: Team C vascular Surgery  Consulting attending: Dr. Malave    HPI:  This is a 89W with history of DM2, CKD 3, HTN, HLD, and OA who presents to the hospital with complaints of persistent b/l feet pain. Said that the pain has been ongoing for a long time and is unrelenting. Said that the pain comes and goes and denies it being exacerbated by walking. Said that she was recently given a new medication to help with the pain (gabapentin) but denies that medication improving the pain. Said that she has become more bed bound due to the pain. Otherwise denies any other complaints at this time. Said that her appetite is still poor. Patient was recently hospitalized from 4/9 - 4/11 for the same foot pain and hypomagnesemia. She was seen by PT during that admission and was recommended to go to [facility] but she and her son deferred against that and she instead went back home. Her hypomagnesemia was also repleted and was wnl on her discharge.     On arrival to the hospital, her vitals were T 98. P 89, /81, R 18, O2 sat 100% RA. Her lab work did not show any acute abnormalities. Her lactate was noted to be mildly elevated to 2.2. She had JAMIE study done that showed that she had multilevel PAD of b/l legs. She was given morphine 2mg IVP x2. She was admitted to medicine for further management. (16 Apr 2019 22:11)      PAST MEDICAL HISTORY:  Stage III chronic kidney disease  OA (osteoarthritis)  DM (diabetes mellitus)  HLD (hyperlipidemia)  HTN (hypertension)  Appendicitis  Hypoglycemia associated with diabetes  Osteoarthritis  Arthritis  Hypercholesterolemia  DM (Diabetes Mellitus)  Hypertension      PAST SURGICAL HISTORY:  H/O eye surgery  H/O bilateral cataract extraction  S/P appendectomy  No significant past surgical history      MEDICATIONS:  acetaminophen   Tablet .. 650 milliGRAM(s) Oral every 6 hours PRN  cilostazol 100 milliGRAM(s) Oral two times a day  dextrose 40% Gel 15 Gram(s) Oral once PRN  dextrose 5%. 1000 milliLiter(s) IV Continuous <Continuous>  dextrose 50% Injectable 12.5 Gram(s) IV Push once  dextrose 50% Injectable 25 Gram(s) IV Push once  dextrose 50% Injectable 25 Gram(s) IV Push once  glucagon  Injectable 1 milliGRAM(s) IntraMuscular once PRN  heparin  Injectable 5000 Unit(s) SubCutaneous every 8 hours  insulin detemir injectable (LEVEMIR) 5 Unit(s) SubCutaneous two times a day  insulin lispro (HumaLOG) corrective regimen sliding scale   SubCutaneous three times a day before meals  insulin lispro (HumaLOG) corrective regimen sliding scale   SubCutaneous at bedtime  lisinopril 5 milliGRAM(s) Oral daily  metoprolol tartrate 25 milliGRAM(s) Oral two times a day      ALLERGIES:  No Known Allergies      VITALS & I/Os:  Vital Signs Last 24 Hrs  T(C): 36.6 (16 Apr 2019 22:02), Max: 36.7 (16 Apr 2019 08:50)  T(F): 97.8 (16 Apr 2019 22:02), Max: 98 (16 Apr 2019 08:50)  HR: 86 (16 Apr 2019 22:02) (70 - 89)  BP: 107/50 (16 Apr 2019 22:02) (107/50 - 168/91)  BP(mean): --  RR: 17 (16 Apr 2019 21:54) (17 - 18)  SpO2: 99% (16 Apr 2019 21:54) (95% - 100%)    I&O's Summary      PHYSICAL EXAM:  General: No acute distress, appears nontoxic  Respiratory: Breathing unlabored  Cardiovascular: RRR  Abdominal: Soft, nondistended, nontender. No rebound.  Extremities: Warm, well perfused    LABS:                        10.5   6.76  )-----------( 268      ( 16 Apr 2019 09:20 )             33.6     04-16    138  |  101  |  11  ----------------------------<  246<H>  4.0   |  23  |  1.25    Ca    9.3      16 Apr 2019 09:27    TPro  7.3  /  Alb  3.9  /  TBili  0.7  /  DBili  x   /  AST  17  /  ALT  13  /  AlkPhos  95  04-16    Lactate:  04-16 @ 09:28  2.2    PT/INR - ( 16 Apr 2019 09:20 )   PT: 11.5 SEC;   INR: 1.03          PTT - ( 16 Apr 2019 09:20 )  PTT:28.5 SEC              IMAGING:  < from: [facility] Duplex JAMIE. (04.16.19 @ 15:59) >  Procedure: Real-time grayscale and color Duplex  ultrasonography was used to interrogate the bilateral  lower extremity arteries.  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)  ------------------------------------------------------------------------  PRESSURE (mm Hg):  ------------------------------------------------------------------------  RIGHT (BP):  Brachial: 201  High Thigh:  Low Thigh: 103  Calf: 127  Ankle-PT: 59  Ankle-DP: 59  Greate Toe: 41  JAMIE: 0.29  ------------------------------------------------------------------------  LEFT (BP):  Brachial: 195  High Thigh:  Low Thigh:  Calf:  Ankle-PT: 45  Ankle-DP: 32  Greate Toe:  JAMIE: 0.22  ------------------------------------------------------------------------  WAVEFORM:  ------------------------------------------------------------------------  RIGHT:  CFA:  Popliteal:  Ankle-PT:  Ankle-DP:  ------------------------------------------------------------------------  LEFT:  CFA:  Popliteal:  Ankle-PT:  Ankle-DP:  ------------------------------------------------------------------------  PSA/AVF:  ------------------------------------------------------------------------  RIGHT:  Pseudoaneurysm:  A-V fistula:  TBI: 0.20  ------------------------------------------------------------------------  LEFT:  Pseudoaneurysm:  A-V fistula:  TBI:  ------------------------------------------------------------------------  Right Findings: The ankle-brachial index (JAMIE) on the  right is severely decreased (0.29).    The toe-brachial index (TBI) on the right is severely  decreased (0.20). The right toe pressure is 41 mmHg.    Pulse volume recording (PVR) waveforms are normal in the  thigh. Waveforms are diminshed in amplitude at the left  calf, ankle, and digits.  There is a significant decrease in segmental pressures  between the brachial and thigh, and between the calf and  ankle segments.  Left Findings: The ankle-brachial index (JAMIE) on the left  is severely decreased (0.22).    Pulse volume recording (PVR) waveforms are normal in the  thigh. Waveforms are diminshed in amplitude at the right  calf and ankle. Waveforms are absent/flat at the  metatarsal and digits.  There is a significant decrease in segmental pressures  between the calf and ankle.  Segmental pressures were not  accurate in the left thigh because of non-compressibility.  ------------------------------------------------------------------------  Summary/Impressions:  1.  Right JAMIE is severely decreased (0.29). Right TBI is  severely decreased (0.20).  Waveform and segmental  pressure analyses suggest the presence of multi-segment  arterial disease in the right lower extremity.  2.  Left JAMIE is severely decreased (0.22).  Waveform and  segmental pressure analyses suggest the presence of  multi-segment arterial disease in the left lower  extremity.  Results communicated to Dr. BECKY Mccrary on 4/16/19 at 4:45  pm.    < end of copied text >

## 2019-04-17 NOTE — CONSULT NOTE ADULT - ATTENDING COMMENTS
89 yo with hx HTN inc cholesterol OA PAD pw rest pain   Hypomagnesemia at present     Suggest  -Appropriate Mag supplementation needs to go through the enteropathic pathway;  Additional IV Mag will short term increase the levels but then lost through the urine;  No diarrhea or NV noted  -CKD stage 3 and moderate risk for MARIS(2-5%).  Need IVF on the day of the angiogram  -Estimated GFR is about 40-45

## 2019-04-17 NOTE — CONSULT NOTE ADULT - ASSESSMENT
89 F with chronic PVD with rest pain in feet    - will discuss options for possible angiography with angioplasty with attending and patient further however patient is high risk for SVETA  - recommend Pletal if medically cleared (Pletal is contraindicated in heart failure of any severity), Statin therapy, ASA 81, gabapentin first    SOHAMT Steve  PGY 3 Vascular Surgery

## 2019-04-18 LAB
ALBUMIN SERPL ELPH-MCNC: 3 G/DL — LOW (ref 3.3–5)
ALP SERPL-CCNC: 72 U/L — SIGNIFICANT CHANGE UP (ref 40–120)
ALT FLD-CCNC: 5 U/L — SIGNIFICANT CHANGE UP (ref 4–33)
ANION GAP SERPL CALC-SCNC: 10 MMO/L — SIGNIFICANT CHANGE UP (ref 7–14)
APPEARANCE UR: SIGNIFICANT CHANGE UP
AST SERPL-CCNC: 14 U/L — SIGNIFICANT CHANGE UP (ref 4–32)
BASOPHILS # BLD AUTO: 0.03 K/UL — SIGNIFICANT CHANGE UP (ref 0–0.2)
BASOPHILS NFR BLD AUTO: 0.6 % — SIGNIFICANT CHANGE UP (ref 0–2)
BILIRUB SERPL-MCNC: 0.8 MG/DL — SIGNIFICANT CHANGE UP (ref 0.2–1.2)
BILIRUB UR-MCNC: NEGATIVE — SIGNIFICANT CHANGE UP
BLOOD UR QL VISUAL: SIGNIFICANT CHANGE UP
BUN SERPL-MCNC: 20 MG/DL — SIGNIFICANT CHANGE UP (ref 7–23)
CALCIUM SERPL-MCNC: 8.3 MG/DL — LOW (ref 8.4–10.5)
CHLORIDE SERPL-SCNC: 102 MMOL/L — SIGNIFICANT CHANGE UP (ref 98–107)
CO2 SERPL-SCNC: 25 MMOL/L — SIGNIFICANT CHANGE UP (ref 22–31)
COLOR SPEC: YELLOW — SIGNIFICANT CHANGE UP
CREAT SERPL-MCNC: 1.52 MG/DL — HIGH (ref 0.5–1.3)
EOSINOPHIL # BLD AUTO: 0.15 K/UL — SIGNIFICANT CHANGE UP (ref 0–0.5)
EOSINOPHIL NFR BLD AUTO: 2.8 % — SIGNIFICANT CHANGE UP (ref 0–6)
EPI CELLS # UR: SIGNIFICANT CHANGE UP
GLUCOSE BLDC GLUCOMTR-MCNC: 100 MG/DL — HIGH (ref 70–99)
GLUCOSE BLDC GLUCOMTR-MCNC: 152 MG/DL — HIGH (ref 70–99)
GLUCOSE BLDC GLUCOMTR-MCNC: 175 MG/DL — HIGH (ref 70–99)
GLUCOSE BLDC GLUCOMTR-MCNC: 97 MG/DL — SIGNIFICANT CHANGE UP (ref 70–99)
GLUCOSE SERPL-MCNC: 104 MG/DL — HIGH (ref 70–99)
GLUCOSE UR-MCNC: NEGATIVE — SIGNIFICANT CHANGE UP
HCT VFR BLD CALC: 29.2 % — LOW (ref 34.5–45)
HGB BLD-MCNC: 9.5 G/DL — LOW (ref 11.5–15.5)
IMM GRANULOCYTES NFR BLD AUTO: 0.2 % — SIGNIFICANT CHANGE UP (ref 0–1.5)
KETONES UR-MCNC: NEGATIVE — SIGNIFICANT CHANGE UP
LEUKOCYTE ESTERASE UR-ACNC: HIGH
LYMPHOCYTES # BLD AUTO: 1.69 K/UL — SIGNIFICANT CHANGE UP (ref 1–3.3)
LYMPHOCYTES # BLD AUTO: 31.3 % — SIGNIFICANT CHANGE UP (ref 13–44)
MAGNESIUM SERPL-MCNC: 2 MG/DL — SIGNIFICANT CHANGE UP (ref 1.6–2.6)
MCHC RBC-ENTMCNC: 29.1 PG — SIGNIFICANT CHANGE UP (ref 27–34)
MCHC RBC-ENTMCNC: 32.5 % — SIGNIFICANT CHANGE UP (ref 32–36)
MCV RBC AUTO: 89.6 FL — SIGNIFICANT CHANGE UP (ref 80–100)
MONOCYTES # BLD AUTO: 0.44 K/UL — SIGNIFICANT CHANGE UP (ref 0–0.9)
MONOCYTES NFR BLD AUTO: 8.1 % — SIGNIFICANT CHANGE UP (ref 2–14)
NEUTROPHILS # BLD AUTO: 3.08 K/UL — SIGNIFICANT CHANGE UP (ref 1.8–7.4)
NEUTROPHILS NFR BLD AUTO: 57 % — SIGNIFICANT CHANGE UP (ref 43–77)
NITRITE UR-MCNC: NEGATIVE — SIGNIFICANT CHANGE UP
NRBC # FLD: 0 K/UL — SIGNIFICANT CHANGE UP (ref 0–0)
PH UR: 6.5 — SIGNIFICANT CHANGE UP (ref 5–8)
PHOSPHATE SERPL-MCNC: 3.3 MG/DL — SIGNIFICANT CHANGE UP (ref 2.5–4.5)
PLATELET # BLD AUTO: 250 K/UL — SIGNIFICANT CHANGE UP (ref 150–400)
PMV BLD: 10.4 FL — SIGNIFICANT CHANGE UP (ref 7–13)
POTASSIUM SERPL-MCNC: 4.5 MMOL/L — SIGNIFICANT CHANGE UP (ref 3.5–5.3)
POTASSIUM SERPL-SCNC: 4.5 MMOL/L — SIGNIFICANT CHANGE UP (ref 3.5–5.3)
PROT SERPL-MCNC: 6 G/DL — SIGNIFICANT CHANGE UP (ref 6–8.3)
PROT UR-MCNC: 50 — SIGNIFICANT CHANGE UP
RBC # BLD: 3.26 M/UL — LOW (ref 3.8–5.2)
RBC # FLD: 15.6 % — HIGH (ref 10.3–14.5)
SODIUM SERPL-SCNC: 137 MMOL/L — SIGNIFICANT CHANGE UP (ref 135–145)
SP GR SPEC: 1.04 — SIGNIFICANT CHANGE UP (ref 1–1.04)
UROBILINOGEN FLD QL: NORMAL — SIGNIFICANT CHANGE UP
WBC # BLD: 5.4 K/UL — SIGNIFICANT CHANGE UP (ref 3.8–10.5)
WBC # FLD AUTO: 5.4 K/UL — SIGNIFICANT CHANGE UP (ref 3.8–10.5)
WBC UR QL: SIGNIFICANT CHANGE UP (ref 0–?)

## 2019-04-18 PROCEDURE — 75635 CT ANGIO ABDOMINAL ARTERIES: CPT | Mod: 26

## 2019-04-18 RX ORDER — MAGNESIUM OXIDE 400 MG ORAL TABLET 241.3 MG
400 TABLET ORAL
Qty: 0 | Refills: 0 | Status: DISCONTINUED | OUTPATIENT
Start: 2019-04-18 | End: 2019-04-19

## 2019-04-18 RX ORDER — SODIUM CHLORIDE 9 MG/ML
1000 INJECTION INTRAMUSCULAR; INTRAVENOUS; SUBCUTANEOUS
Qty: 0 | Refills: 0 | Status: DISCONTINUED | OUTPATIENT
Start: 2019-04-18 | End: 2019-04-18

## 2019-04-18 RX ORDER — SODIUM CHLORIDE 9 MG/ML
1000 INJECTION INTRAMUSCULAR; INTRAVENOUS; SUBCUTANEOUS
Qty: 0 | Refills: 0 | Status: DISCONTINUED | OUTPATIENT
Start: 2019-04-18 | End: 2019-04-19

## 2019-04-18 RX ORDER — METOPROLOL TARTRATE 50 MG
25 TABLET ORAL DAILY
Qty: 0 | Refills: 0 | Status: DISCONTINUED | OUTPATIENT
Start: 2019-04-19 | End: 2019-04-23

## 2019-04-18 RX ADMIN — HEPARIN SODIUM 5000 UNIT(S): 5000 INJECTION INTRAVENOUS; SUBCUTANEOUS at 21:38

## 2019-04-18 RX ADMIN — CILOSTAZOL 100 MILLIGRAM(S): 100 TABLET ORAL at 06:51

## 2019-04-18 RX ADMIN — CILOSTAZOL 100 MILLIGRAM(S): 100 TABLET ORAL at 16:59

## 2019-04-18 RX ADMIN — Medication 650 MILLIGRAM(S): at 20:21

## 2019-04-18 RX ADMIN — Medication 650 MILLIGRAM(S): at 21:21

## 2019-04-18 RX ADMIN — MAGNESIUM OXIDE 400 MG ORAL TABLET 400 MILLIGRAM(S): 241.3 TABLET ORAL at 16:57

## 2019-04-18 RX ADMIN — Medication 650 MILLIGRAM(S): at 10:15

## 2019-04-18 RX ADMIN — Medication 5 UNIT(S): at 21:38

## 2019-04-18 RX ADMIN — SODIUM CHLORIDE 75 MILLILITER(S): 9 INJECTION INTRAMUSCULAR; INTRAVENOUS; SUBCUTANEOUS at 16:14

## 2019-04-18 RX ADMIN — Medication 650 MILLIGRAM(S): at 09:01

## 2019-04-18 RX ADMIN — MAGNESIUM OXIDE 400 MG ORAL TABLET 400 MILLIGRAM(S): 241.3 TABLET ORAL at 12:09

## 2019-04-18 RX ADMIN — GABAPENTIN 100 MILLIGRAM(S): 400 CAPSULE ORAL at 12:09

## 2019-04-18 RX ADMIN — Medication 1: at 12:08

## 2019-04-18 RX ADMIN — HEPARIN SODIUM 5000 UNIT(S): 5000 INJECTION INTRAVENOUS; SUBCUTANEOUS at 14:14

## 2019-04-18 RX ADMIN — HEPARIN SODIUM 5000 UNIT(S): 5000 INJECTION INTRAVENOUS; SUBCUTANEOUS at 06:52

## 2019-04-18 RX ADMIN — MAGNESIUM OXIDE 400 MG ORAL TABLET 400 MILLIGRAM(S): 241.3 TABLET ORAL at 08:36

## 2019-04-18 RX ADMIN — Medication 5 UNIT(S): at 08:35

## 2019-04-18 RX ADMIN — ATORVASTATIN CALCIUM 10 MILLIGRAM(S): 80 TABLET, FILM COATED ORAL at 21:38

## 2019-04-18 NOTE — CHART NOTE - NSCHARTNOTEFT_GEN_A_CORE
Notified by Radiology about results of Ct Angio abd aorta filling defect from right femoral artery to popliteal artery concern for thrombus. Vascular resident notified about results said will discuss with primary team tomorrow. Dr Fernando made aware.

## 2019-04-18 NOTE — PROGRESS NOTE ADULT - ATTENDING COMMENTS
Renal attd    -Increased creatinine noted;  Cont IVF for 24 hours   -CTA later today  Off Lisinopril as the BP is soft and she should have a higher MAP   Mag improving Renal attd    -Increased creatinine noted;  Cont IVF for 24 hours   -CTA later today  Off Lisinopril as the BP is soft and she should have a higher MAP   Mag improving.  Reduce to bid

## 2019-04-18 NOTE — PROVIDER CONTACT NOTE (OTHER) - ACTION/TREATMENT ORDERED:
do not administer morphine. Nacl bolus 250 ml then recheck BP
No further action is required at this time
No further action is required at this time

## 2019-04-18 NOTE — PROGRESS NOTE ADULT - SUBJECTIVE AND OBJECTIVE BOX
S: Still with persistent b/l foot pain. Patient denies chest pain or shortness of breath.   Review of systems otherwise (-)  	    MEDICATIONS  (STANDING):  atorvastatin 10 milliGRAM(s) Oral at bedtime  cilostazol 100 milliGRAM(s) Oral two times a day  dextrose 5%. 1000 milliLiter(s) (50 mL/Hr) IV Continuous <Continuous>  dextrose 50% Injectable 12.5 Gram(s) IV Push once  dextrose 50% Injectable 25 Gram(s) IV Push once  dextrose 50% Injectable 25 Gram(s) IV Push once  gabapentin 100 milliGRAM(s) Oral daily  heparin  Injectable 5000 Unit(s) SubCutaneous every 8 hours  insulin detemir injectable (LEVEMIR) 5 Unit(s) SubCutaneous two times a day  insulin lispro (HumaLOG) corrective regimen sliding scale   SubCutaneous three times a day before meals  insulin lispro (HumaLOG) corrective regimen sliding scale   SubCutaneous at bedtime  magnesium oxide 400 milliGRAM(s) Oral three times a day with meals  metoprolol tartrate 25 milliGRAM(s) Oral two times a day  sodium chloride 0.9%. 1000 milliLiter(s) (75 mL/Hr) IV Continuous <Continuous>      LABS:	 	                          9.5    5.40  )-----------( 250      ( 18 Apr 2019 05:54 )             29.2     Hemoglobin: 9.5 g/dL (04-18 @ 05:54)  Hemoglobin: 10.5 g/dL (04-17 @ 06:00)  Hemoglobin: 10.5 g/dL (04-16 @ 09:20)    04-18    137  |  102  |  20  ----------------------------<  104<H>  4.5   |  25  |  1.52<H>    Ca    8.3<L>      18 Apr 2019 05:54  Phos  3.3     04-18  Mg     2.0     04-18    TPro  6.0  /  Alb  3.0<L>  /  TBili  0.8  /  DBili  x   /  AST  14  /  ALT  5   /  AlkPhos  72  04-18    Creatinine Trend: 1.52<--, 1.18<--, 1.11<--, 1.25<--, 1.05<--, 1.12<--    PHYSICAL EXAM:  T(C): 36.8 (04-18-19 @ 06:44), Max: 37 (04-17-19 @ 17:42)  HR: 69 (04-18-19 @ 06:44) (64 - 74)  BP: 98/41 (04-18-19 @ 06:44) (98/41 - 112/49)  RR: 14 (04-18-19 @ 06:44) (14 - 18)  SpO2: 99% (04-18-19 @ 06:44) (99% - 100%)  Wt(kg): --  I&O's Summary      Gen: Appears well in NAD  HEENT:  (-)icterus (-)pallor  CV: Normal S1 S2, No JVD, No murmurs  Resp:  Clear to auscultation B/L, normal effort  GI: (+) BS Soft, NT, ND  Lymph:  (-)Edema, (-)obvious lymphadenopathy  Skin: Warm to touch, Normal turgor  Psych: Appropriate mood and affect      ECG: SR, 1st degree AV block (noted on previous EKGs)    < from: Transthoracic Echocardiogram (10.31.18 @ 07:42) >  CONCLUSIONS:  1. Normal left ventricular internal dimensions and wall  thicknesses.  2. Normal left ventricular systolic function. No segmental  wall motion abnormalities.  3. Normal right ventricular size and function.  4. Normal tricuspid valve. Minimal tricuspid regurgitation.  *** Compared with echocardiogram of 10/19/2016, no  significant changes noted.    < end of copied text >    < from: Nuclear Stress Test-Pharmacologic (03.07.17 @ 10:30) >  IMPRESSIONS:  * No ECG evidence of ischemia  * Myocardial Perfusion SPECT results are abnormal.  * There is a medium sized, mild defect in anterior wall  that is partially reversible, suggestive of mild ischemia.  * The left ventricle was normal LV size.  * Post-stress resting myocardial perfusion gated SPECT  imaging was performed (LVEF > 70%;LVEDV = 47 ml.) and  showed normal wall motion.    *** Conclusions ***  There is SPECT evidence of mild ischemia during  Regadenoson  pharmacologic stress in the distribution of  LAD.    < end of copied text >      ASSESSMENT/PLAN: 89 y/o female with history of DM2, CKD 3, HTN, HLD, and OA who presents to the hospital with complaints of persistent b/l feet pain. Patient had JAMIE study done that showed that she had multilevel PAD of b/l legs. Cardiology consulted for PAD/HTN management.     -Not in clinical heart failure and no anginal symptoms  -Last TTE 10/31/18 noted above - normal LV systolic function  -Last Stress test in 2017 - noted to have mild anterior wall ischemia but was medically managed - however patient reports was taken off Aspirin due to her recurrent falls - Continue BB/Statin  -Soft BP noted - Lisinopril d/maggi per renal, Will change metoprolol 25mg BID to Toprol XL 25mg daily with hold parameters (Based on previous admission and discharge notes patient was only on medication daily)  -Follow up vascular plan regarding PAD - CTA pending  -Patient optimized from CV perspective for LE Angiogram

## 2019-04-18 NOTE — PROGRESS NOTE ADULT - SUBJECTIVE AND OBJECTIVE BOX
Patient is a 89y old  Female who presents with a chief complaint of b/l foot pain (18 Apr 2019 13:11)      SUBJECTIVE / OVERNIGHT EVENTS: still with LE pain though improved     ROS:  Resp: No cough no sputum production  CVS: No chest pain no palpitations no orthopnea  GI: no N/V/D  : no dysuria, no hematuria  Neuro: no weakness no paresthesias  Heme: No petechiae no easy bruising  Msk: No joint pain no swelling  Skin: No rash no itching        MEDICATIONS  (STANDING):  atorvastatin 10 milliGRAM(s) Oral at bedtime  cilostazol 100 milliGRAM(s) Oral two times a day  dextrose 5%. 1000 milliLiter(s) (50 mL/Hr) IV Continuous <Continuous>  dextrose 50% Injectable 12.5 Gram(s) IV Push once  dextrose 50% Injectable 25 Gram(s) IV Push once  dextrose 50% Injectable 25 Gram(s) IV Push once  gabapentin 100 milliGRAM(s) Oral daily  heparin  Injectable 5000 Unit(s) SubCutaneous every 8 hours  insulin detemir injectable (LEVEMIR) 5 Unit(s) SubCutaneous two times a day  insulin lispro (HumaLOG) corrective regimen sliding scale   SubCutaneous three times a day before meals  insulin lispro (HumaLOG) corrective regimen sliding scale   SubCutaneous at bedtime  magnesium oxide 400 milliGRAM(s) Oral two times a day with meals  sodium chloride 0.9%. 1000 milliLiter(s) (75 mL/Hr) IV Continuous <Continuous>    MEDICATIONS  (PRN):  acetaminophen   Tablet .. 650 milliGRAM(s) Oral every 6 hours PRN Moderate Pain (4 - 6)  dextrose 40% Gel 15 Gram(s) Oral once PRN Blood Glucose LESS THAN 70 milliGRAM(s)/deciliter  glucagon  Injectable 1 milliGRAM(s) IntraMuscular once PRN Glucose LESS THAN 70 milligrams/deciliter        CAPILLARY BLOOD GLUCOSE      POCT Blood Glucose.: 97 mg/dL (18 Apr 2019 16:48)  POCT Blood Glucose.: 152 mg/dL (18 Apr 2019 11:53)  POCT Blood Glucose.: 100 mg/dL (18 Apr 2019 07:24)  POCT Blood Glucose.: 225 mg/dL (17 Apr 2019 21:47)  POCT Blood Glucose.: 221 mg/dL (17 Apr 2019 20:20)    I&O's Summary      Vital Signs Last 24 Hrs  T(C): 36.8 (18 Apr 2019 06:44), Max: 37 (17 Apr 2019 17:42)  T(F): 98.2 (18 Apr 2019 06:44), Max: 98.6 (17 Apr 2019 17:42)  HR: 73 (18 Apr 2019 14:20) (64 - 74)  BP: 92/40 (18 Apr 2019 14:20) (92/40 - 112/49)  BP(mean): --  RR: 16 (18 Apr 2019 14:20) (14 - 18)  SpO2: 94% (18 Apr 2019 14:20) (94% - 100%)    PHYSICAL EXAM:  GENERAL: NAD, not in any apparent distress ,  HEAD:  Atraumatic, Normocephalic  NECK: Supple, No JVD, Normal thyroid  NERVOUS SYSTEM:  Alert & Oriented, No focal deficit   CHEST/LUNG: Good air entry bilateral with no  rales, rhonchi, wheezing, or rubs  HEART: Regular rate and rhythm; No murmurs, rubs, or gallops  ABDOMEN: Soft, Nontender, Nondistended; Bowel sounds present  EXTREMITIES:  tender to touch both feet but warm     LABS:                        9.5    5.40  )-----------( 250      ( 18 Apr 2019 05:54 )             29.2     04-18    137  |  102  |  20  ----------------------------<  104<H>  4.5   |  25  |  1.52<H>    Ca    8.3<L>      18 Apr 2019 05:54  Phos  3.3     04-18  Mg     2.0     04-18    TPro  6.0  /  Alb  3.0<L>  /  TBili  0.8  /  DBili  x   /  AST  14  /  ALT  5   /  AlkPhos  72  04-18                All consultant(s) notes reviewed and care discussed with other providers        Contact Number, Dr Fernando [phone]

## 2019-04-18 NOTE — PROVIDER CONTACT NOTE (OTHER) - BACKGROUND
Patient came in for bilateral lower extremity pain, found to have PAD. History of hyperlipidemia, hypertension and DM

## 2019-04-18 NOTE — PROGRESS NOTE ADULT - SUBJECTIVE AND OBJECTIVE BOX
NEPHROLOGY - [facility] (formerly known as [facility])    Patient seen and examined.  BP soft  NAD, c/o persistent LE discomfort    MEDICATIONS  (STANDING):  atorvastatin 10 milliGRAM(s) Oral at bedtime  cilostazol 100 milliGRAM(s) Oral two times a day  gabapentin 100 milliGRAM(s) Oral daily  heparin  Injectable 5000 Unit(s) SubCutaneous every 8 hours  lisinopril 5 milliGRAM(s) Oral daily  magnesium oxide 400 milliGRAM(s) Oral three times a day with meals  metoprolol tartrate 25 milliGRAM(s) Oral two times a day    VITALS:  T(C): , Max: 37 (04-17-19 @ 17:42)  T(F): , Max: 98.6 (04-17-19 @ 17:42)  HR: 69 (04-18-19 @ 06:44)  BP: 98/41 (04-18-19 @ 06:44)  RR: 14 (04-18-19 @ 06:44)  SpO2: 99% (04-18-19 @ 06:44)    REVIEW OF SYSTEMS:  Full ROS done and were negative unless otherwise indicated in HPI/assessment.     PHYSICAL EXAM:  Constitutional: NAD  Respiratory: CTA B/L  Cardiovascular: S1 and S2, RRR  Gastrointestinal: + BS, soft, NT, ND  Extremities: no peripheral edema  Neurological: forgetful  : no lorenzana    LABS:                        9.5    5.40  )-----------( 250      ( 18 Apr 2019 05:54 )             29.2     04-18    137  |  102  |  20  ----------------------------<  104<H>  4.5   |  25  |  1.52<H>    Ca    8.3<L>      18 Apr 2019 05:54  Phos  3.3     04-18  Mg     2.0     04-18    TPro  6.0  /  Alb  3.0<L>  /  TBili  0.8  /  DBili  x   /  AST  14  /  ALT  5   /  AlkPhos  72  04-18    ASSESSMENT   89F w/ DM, HTN, HLD, OA, PAD, hypomagnesemia, CKD3 p/w b/l foot pain L>R (4/16)  - CKD: stage 3 due to DM/HTN. Renal fxn will fluctuate based on hemodynamics; overall stable  - HTN: BP soft at times  - PAD: b/l LE, painful  - hypomagnesemia    RECOMMEND  - c/w mag ox 400 mg po tid  - hold lisinopril given hypotension  - no objection to CT angiogram  - IVF NS @ 75 mL/hr x 6 hours on call for CTA  - check urine protein creatinine ratio  - dose meds for a GFR ~ 30-40 mL/min    Franchesca Zee NP  [facility] ([facility], )  [phone] NEPHROLOGY - [facility] (formerly known as [facility])    Patient seen and examined.  BP soft  NAD, c/o persistent LE discomfort    MEDICATIONS  (STANDING):  atorvastatin 10 milliGRAM(s) Oral at bedtime  cilostazol 100 milliGRAM(s) Oral two times a day  gabapentin 100 milliGRAM(s) Oral daily  heparin  Injectable 5000 Unit(s) SubCutaneous every 8 hours  lisinopril 5 milliGRAM(s) Oral daily  magnesium oxide 400 milliGRAM(s) Oral three times a day with meals  metoprolol tartrate 25 milliGRAM(s) Oral two times a day    VITALS:  T(C): , Max: 37 (04-17-19 @ 17:42)  T(F): , Max: 98.6 (04-17-19 @ 17:42)  HR: 69 (04-18-19 @ 06:44)  BP: 98/41 (04-18-19 @ 06:44)  RR: 14 (04-18-19 @ 06:44)  SpO2: 99% (04-18-19 @ 06:44)    REVIEW OF SYSTEMS:  Full ROS done and were negative unless otherwise indicated in HPI/assessment.     PHYSICAL EXAM:  Constitutional: NAD  Respiratory: CTA B/L  Cardiovascular: S1 and S2, RRR  Gastrointestinal: + BS, soft, NT, ND  Extremities: no peripheral edema  Neurological: forgetful  : no lorenzana    LABS:                        9.5    5.40  )-----------( 250      ( 18 Apr 2019 05:54 )             29.2     04-18    137  |  102  |  20  ----------------------------<  104<H>  4.5   |  25  |  1.52<H>    Ca    8.3<L>      18 Apr 2019 05:54  Phos  3.3     04-18  Mg     2.0     04-18    TPro  6.0  /  Alb  3.0<L>  /  TBili  0.8  /  DBili  x   /  AST  14  /  ALT  5   /  AlkPhos  72  04-18    ASSESSMENT   89F w/ DM, HTN, HLD, OA, PAD, hypomagnesemia, CKD3 p/w b/l foot pain L>R (4/16)  - CKD: stage 3 due to DM/HTN. Renal fxn will fluctuate based on hemodynamics; overall stable  - HTN: BP soft at times  - PAD: b/l LE, painful  - hypomagnesemia    RECOMMEND  - c/w mag ox 400 mg po tid  - hold lisinopril given hypotension  - no objection to CT angiogram  - IVF NS @ 75 mL/hr x 6 hours on call for CTA  - check urine protein creatinine ratio  - dose meds for a GFR ~ 30-40 mL/min    D/W RN     Franchesca Zee, RAY  [facility] ([facility], )  [phone]

## 2019-04-19 LAB
ANION GAP SERPL CALC-SCNC: 11 MMO/L — SIGNIFICANT CHANGE UP (ref 7–14)
BASOPHILS # BLD AUTO: 0.03 K/UL — SIGNIFICANT CHANGE UP (ref 0–0.2)
BASOPHILS NFR BLD AUTO: 0.5 % — SIGNIFICANT CHANGE UP (ref 0–2)
BUN SERPL-MCNC: 22 MG/DL — SIGNIFICANT CHANGE UP (ref 7–23)
CALCIUM SERPL-MCNC: 8.8 MG/DL — SIGNIFICANT CHANGE UP (ref 8.4–10.5)
CHLORIDE SERPL-SCNC: 103 MMOL/L — SIGNIFICANT CHANGE UP (ref 98–107)
CO2 SERPL-SCNC: 23 MMOL/L — SIGNIFICANT CHANGE UP (ref 22–31)
CREAT SERPL-MCNC: 1.64 MG/DL — HIGH (ref 0.5–1.3)
EOSINOPHIL # BLD AUTO: 0.19 K/UL — SIGNIFICANT CHANGE UP (ref 0–0.5)
EOSINOPHIL NFR BLD AUTO: 3.4 % — SIGNIFICANT CHANGE UP (ref 0–6)
GLUCOSE BLDC GLUCOMTR-MCNC: 129 MG/DL — HIGH (ref 70–99)
GLUCOSE BLDC GLUCOMTR-MCNC: 158 MG/DL — HIGH (ref 70–99)
GLUCOSE BLDC GLUCOMTR-MCNC: 181 MG/DL — HIGH (ref 70–99)
GLUCOSE BLDC GLUCOMTR-MCNC: 195 MG/DL — HIGH (ref 70–99)
GLUCOSE BLDC GLUCOMTR-MCNC: 227 MG/DL — HIGH (ref 70–99)
GLUCOSE SERPL-MCNC: 84 MG/DL — SIGNIFICANT CHANGE UP (ref 70–99)
HCT VFR BLD CALC: 29.1 % — LOW (ref 34.5–45)
HGB BLD-MCNC: 9.3 G/DL — LOW (ref 11.5–15.5)
IMM GRANULOCYTES NFR BLD AUTO: 0.2 % — SIGNIFICANT CHANGE UP (ref 0–1.5)
LYMPHOCYTES # BLD AUTO: 1.98 K/UL — SIGNIFICANT CHANGE UP (ref 1–3.3)
LYMPHOCYTES # BLD AUTO: 35.7 % — SIGNIFICANT CHANGE UP (ref 13–44)
MAGNESIUM SERPL-MCNC: 1.9 MG/DL — SIGNIFICANT CHANGE UP (ref 1.6–2.6)
MCHC RBC-ENTMCNC: 29 PG — SIGNIFICANT CHANGE UP (ref 27–34)
MCHC RBC-ENTMCNC: 32 % — SIGNIFICANT CHANGE UP (ref 32–36)
MCV RBC AUTO: 90.7 FL — SIGNIFICANT CHANGE UP (ref 80–100)
MONOCYTES # BLD AUTO: 0.48 K/UL — SIGNIFICANT CHANGE UP (ref 0–0.9)
MONOCYTES NFR BLD AUTO: 8.7 % — SIGNIFICANT CHANGE UP (ref 2–14)
NEUTROPHILS # BLD AUTO: 2.85 K/UL — SIGNIFICANT CHANGE UP (ref 1.8–7.4)
NEUTROPHILS NFR BLD AUTO: 51.5 % — SIGNIFICANT CHANGE UP (ref 43–77)
NRBC # FLD: 0 K/UL — SIGNIFICANT CHANGE UP (ref 0–0)
PHOSPHATE SERPL-MCNC: 3.1 MG/DL — SIGNIFICANT CHANGE UP (ref 2.5–4.5)
PLATELET # BLD AUTO: 256 K/UL — SIGNIFICANT CHANGE UP (ref 150–400)
PMV BLD: 10.3 FL — SIGNIFICANT CHANGE UP (ref 7–13)
POTASSIUM SERPL-MCNC: 4.8 MMOL/L — SIGNIFICANT CHANGE UP (ref 3.5–5.3)
POTASSIUM SERPL-SCNC: 4.8 MMOL/L — SIGNIFICANT CHANGE UP (ref 3.5–5.3)
RBC # BLD: 3.21 M/UL — LOW (ref 3.8–5.2)
RBC # FLD: 15.7 % — HIGH (ref 10.3–14.5)
SODIUM SERPL-SCNC: 137 MMOL/L — SIGNIFICANT CHANGE UP (ref 135–145)
WBC # BLD: 5.54 K/UL — SIGNIFICANT CHANGE UP (ref 3.8–10.5)
WBC # FLD AUTO: 5.54 K/UL — SIGNIFICANT CHANGE UP (ref 3.8–10.5)

## 2019-04-19 RX ORDER — SODIUM CHLORIDE 9 MG/ML
1000 INJECTION INTRAMUSCULAR; INTRAVENOUS; SUBCUTANEOUS
Qty: 0 | Refills: 0 | Status: DISCONTINUED | OUTPATIENT
Start: 2019-04-19 | End: 2019-04-20

## 2019-04-19 RX ORDER — ACETAMINOPHEN 500 MG
1000 TABLET ORAL ONCE
Qty: 0 | Refills: 0 | Status: COMPLETED | OUTPATIENT
Start: 2019-04-19 | End: 2019-04-19

## 2019-04-19 RX ORDER — MAGNESIUM OXIDE 400 MG ORAL TABLET 241.3 MG
400 TABLET ORAL
Qty: 0 | Refills: 0 | Status: COMPLETED | OUTPATIENT
Start: 2019-04-19 | End: 2019-04-20

## 2019-04-19 RX ORDER — ASPIRIN/CALCIUM CARB/MAGNESIUM 324 MG
81 TABLET ORAL DAILY
Qty: 0 | Refills: 0 | Status: DISCONTINUED | OUTPATIENT
Start: 2019-04-19 | End: 2019-04-23

## 2019-04-19 RX ORDER — MAGNESIUM OXIDE 400 MG ORAL TABLET 241.3 MG
400 TABLET ORAL DAILY
Qty: 0 | Refills: 0 | Status: COMPLETED | OUTPATIENT
Start: 2019-04-21 | End: 2019-04-23

## 2019-04-19 RX ADMIN — Medication 5 UNIT(S): at 08:49

## 2019-04-19 RX ADMIN — SODIUM CHLORIDE 70 MILLILITER(S): 9 INJECTION INTRAMUSCULAR; INTRAVENOUS; SUBCUTANEOUS at 12:39

## 2019-04-19 RX ADMIN — MAGNESIUM OXIDE 400 MG ORAL TABLET 400 MILLIGRAM(S): 241.3 TABLET ORAL at 17:45

## 2019-04-19 RX ADMIN — CILOSTAZOL 100 MILLIGRAM(S): 100 TABLET ORAL at 06:34

## 2019-04-19 RX ADMIN — HEPARIN SODIUM 5000 UNIT(S): 5000 INJECTION INTRAVENOUS; SUBCUTANEOUS at 12:38

## 2019-04-19 RX ADMIN — Medication 25 MILLIGRAM(S): at 06:34

## 2019-04-19 RX ADMIN — Medication 650 MILLIGRAM(S): at 02:49

## 2019-04-19 RX ADMIN — ATORVASTATIN CALCIUM 10 MILLIGRAM(S): 80 TABLET, FILM COATED ORAL at 21:15

## 2019-04-19 RX ADMIN — MAGNESIUM OXIDE 400 MG ORAL TABLET 400 MILLIGRAM(S): 241.3 TABLET ORAL at 08:49

## 2019-04-19 RX ADMIN — SODIUM CHLORIDE 70 MILLILITER(S): 9 INJECTION INTRAMUSCULAR; INTRAVENOUS; SUBCUTANEOUS at 21:15

## 2019-04-19 RX ADMIN — Medication 650 MILLIGRAM(S): at 03:49

## 2019-04-19 RX ADMIN — Medication 1: at 17:45

## 2019-04-19 RX ADMIN — Medication 650 MILLIGRAM(S): at 11:52

## 2019-04-19 RX ADMIN — Medication 400 MILLIGRAM(S): at 00:14

## 2019-04-19 RX ADMIN — Medication 1000 MILLIGRAM(S): at 00:29

## 2019-04-19 RX ADMIN — Medication 650 MILLIGRAM(S): at 10:56

## 2019-04-19 RX ADMIN — GABAPENTIN 100 MILLIGRAM(S): 400 CAPSULE ORAL at 12:38

## 2019-04-19 RX ADMIN — HEPARIN SODIUM 5000 UNIT(S): 5000 INJECTION INTRAVENOUS; SUBCUTANEOUS at 21:15

## 2019-04-19 RX ADMIN — HEPARIN SODIUM 5000 UNIT(S): 5000 INJECTION INTRAVENOUS; SUBCUTANEOUS at 06:34

## 2019-04-19 RX ADMIN — CILOSTAZOL 100 MILLIGRAM(S): 100 TABLET ORAL at 17:45

## 2019-04-19 RX ADMIN — Medication 5 UNIT(S): at 21:14

## 2019-04-19 NOTE — PROGRESS NOTE ADULT - SUBJECTIVE AND OBJECTIVE BOX
NEPHROLOGY - [facility] (formerly known as [facility])    Patient seen and examined.  NAD    MEDICATIONS  (STANDING):  atorvastatin 10 milliGRAM(s) Oral at bedtime  cilostazol 100 milliGRAM(s) Oral two times a day  gabapentin 100 milliGRAM(s) Oral daily  heparin  Injectable 5000 Unit(s) SubCutaneous every 8 hours  magnesium oxide 400 milliGRAM(s) Oral two times a day with meals  metoprolol succinate ER 25 milliGRAM(s) Oral daily  sodium chloride 0.9%. 1000 milliLiter(s) (75 mL/Hr) IV Continuous <Continuous>    VITALS:  T(C): , Max: 36.7 (19 @ 20:59)  T(F): , Max: 98.1 (19 @ 20:59)  HR: 87 (19 @ 06:18)  BP: 125/58 (19 @ 06:18)  RR: 16 (19 @ 06:18)  SpO2: 100% (19 @ 06:18)    Height (cm): 157.48 ( @ 20:59)  Weight (kg): 66.9 ( @ 20:59)  BMI (kg/m2): 27 ( @ 20:59)  BSA (m2): 1.68 ( @ 20:59)    REVIEW OF SYSTEMS:  Full ROS done and were negative unless otherwise indicated in HPI/assessment.     PHYSICAL EXAM:  Constitutional: NAD  Respiratory: CTA B/L  Cardiovascular: S1 and S2, RRR  Gastrointestinal: + BS, soft, NT, ND  Extremities: no peripheral edema  Neurological: forgetful  : no lorenzana      LABS:                        9.3    5.54  )-----------( 256      ( 2019 05:00 )             29.1         137  |  103  |  22  ----------------------------<  84  4.8   |  23  |  1.64<H>    Ca    8.8      2019 05:00  Phos  3.1       Mg     1.9         TPro  6.0  /  Alb  3.0<L>  /  TBili  0.8  /  DBili  x   /  AST  14  /  ALT  5   /  AlkPhos  72      Urine Studies:  Urinalysis Basic - ( 2019 22:10 )    Color: YELLOW / Appearance: HAZY / S.037 / pH: 6.5  Gluc: NEGATIVE / Ketone: NEGATIVE  / Bili: NEGATIVE / Urobili: NORMAL   Blood: TRACE / Protein: 50 / Nitrite: NEGATIVE   Leuk Esterase: MODERATE / RBC: x / WBC TNTC   Sq Epi: x / Non Sq Epi: SMALL / Bacteria: x    RADIOLOGY & ADDITIONAL STUDIES:  < from: CT Angio Abd Aorta w/run-off w/ IV Cont (19 @ 17:48) >  INTERPRETATION:  Long segment filling defect of the proximal right   superficial femral artery (5, 300), likely to the level of the popliteal   artery--representing throbus.     Evaluation of the bilateral lower extremity arteries involving and distal   to the popliteal artery is significantly limited secondary to significant   athlerosclerotic calcifications   nonobstruced loops of small bowel in a right sided spigelian hernia.   Anterior bladder wall thickening may represent underdistension vs   infectious thickening--correalte with UA  These findins were discussed with RAY Orosco by Dr. Stern at 6:44pm    < end of copied text >    ASSESSMENT  89F w/ DM, HTN, HLD, OA, PAD, hypomagnesemia, CKD3 p/w b/l foot pain L>R ()  - CKD: stage 3 due to DM/HTN  - SVETA; due to RAAS, hypotension +/- contrast  - HTN: BP improving of lisinopril  - PAD: b/l LE, painful, S/P CTA   - hypomagnesemia: controlled     RECOMMEND  - c/w mag ox 400 mg po BID x 3 more doses followed by daily  - hold RAAS inhibitors   - c/w IVF NS 75/hr  - check urine protein creatinine ratio  - dose meds for a GFR ~ 30-40 mL/min    Franchesca Zee NP  [facility] ([facility], )  [phone] NEPHROLOGY - [facility] (formerly known as [facility])    Patient seen and examined.  sitting in hallway; agitated but appears aaox3  Off IVF    MEDICATIONS  (STANDING):  atorvastatin 10 milliGRAM(s) Oral at bedtime  cilostazol 100 milliGRAM(s) Oral two times a day  gabapentin 100 milliGRAM(s) Oral daily  heparin  Injectable 5000 Unit(s) SubCutaneous every 8 hours  magnesium oxide 400 milliGRAM(s) Oral two times a day with meals  metoprolol succinate ER 25 milliGRAM(s) Oral daily  sodium chloride 0.9%. 1000 milliLiter(s) (75 mL/Hr) IV Continuous <Continuous>    VITALS:  T(C): , Max: 36.7 (19 @ 20:59)  T(F): , Max: 98.1 (19 @ 20:59)  HR: 87 (19 @ 06:18)  BP: 125/58 (19 @ 06:18)  RR: 16 (19 @ 06:18)  SpO2: 100% (19 @ 06:18)    Height (cm): 157.48 ( @ 20:59)  Weight (kg): 66.9 ( @ 20:59)  BMI (kg/m2): 27 ( @ 20:59)  BSA (m2): 1.68 ( @ 20:59)    REVIEW OF SYSTEMS:  Full ROS done and were negative unless otherwise indicated in HPI/assessment.     PHYSICAL EXAM:  Constitutional: NAD  Respiratory: CTA B/L  Cardiovascular: S1 and S2, RRR  Gastrointestinal: + BS, soft, NT, ND  Extremities: no peripheral edema  Neurological: forgetful  : no lorenzana    LABS:                        9.3    5.54  )-----------( 256      ( 2019 05:00 )             29.1         137  |  103  |  22  ----------------------------<  84  4.8   |  23  |  1.64<H>    Ca    8.8      2019 05:00  Phos  3.1       Mg     1.9         TPro  6.0  /  Alb  3.0<L>  /  TBili  0.8  /  DBili  x   /  AST  14  /  ALT  5   /  AlkPhos  72      Urine Studies:  Urinalysis Basic - ( 2019 22:10 )    Color: YELLOW / Appearance: HAZY / S.037 / pH: 6.5  Gluc: NEGATIVE / Ketone: NEGATIVE  / Bili: NEGATIVE / Urobili: NORMAL   Blood: TRACE / Protein: 50 / Nitrite: NEGATIVE   Leuk Esterase: MODERATE / RBC: x / WBC TNTC   Sq Epi: x / Non Sq Epi: SMALL / Bacteria: x    RADIOLOGY & ADDITIONAL STUDIES:  < from: CT Angio Abd Aorta w/run-off w/ IV Cont (19 @ 17:48) >  INTERPRETATION:  Long segment filling defect of the proximal right   superficial femral artery (5, 300), likely to the level of the popliteal   artery--representing throbus.     Evaluation of the bilateral lower extremity arteries involving and distal   to the popliteal artery is significantly limited secondary to significant   athlerosclerotic calcifications   nonobstruced loops of small bowel in a right sided spigelian hernia.   Anterior bladder wall thickening may represent underdistension vs   infectious thickening--correalte with UA  These findins were discussed with RAY Orosco by Dr. Stern at 6:44pm    < end of copied text >    ASSESSMENT  89F w/ DM, HTN, HLD, OA, PAD, hypomagnesemia, CKD3 p/w b/l foot pain L>R ()  - CKD: stage 3 due to DM/HTN  - SVETA; due to RAAS, hypotension +/- contrast  - HTN: BP improving of lisinopril  - PAD: b/l LE, painful, S/P CTA   - hypomagnesemia: controlled     RECOMMEND  - c/w mag ox 400 mg po BID x 3 more doses followed by daily  - hold RAAS inhibitors   - check urine protein creatinine ratio  - dose meds for a GFR ~ 30-40 mL/min    Franchesca Zee NP  [facility] ([facility], )  [phone]

## 2019-04-19 NOTE — PROGRESS NOTE ADULT - SUBJECTIVE AND OBJECTIVE BOX
Patient is a 89y old  Female who presents with a chief complaint of b/l foot pain (2019 11:58)    SUBJECTIVE / OVERNIGHT EVENTS: no overnight events    ROS:  Resp: No cough no sputum production  CVS: No chest pain no palpitations no orthopnea  GI: no N/V/D  : no dysuria, no hematuria  Neuro: no weakness no paresthesias  Heme: No petechiae no easy bruising  Msk: No joint pain no swelling  Skin: No rash no itching        MEDICATIONS  (STANDING):  aspirin  chewable 81 milliGRAM(s) Oral daily  atorvastatin 10 milliGRAM(s) Oral at bedtime  cilostazol 100 milliGRAM(s) Oral two times a day  dextrose 5%. 1000 milliLiter(s) (50 mL/Hr) IV Continuous <Continuous>  dextrose 50% Injectable 12.5 Gram(s) IV Push once  dextrose 50% Injectable 25 Gram(s) IV Push once  dextrose 50% Injectable 25 Gram(s) IV Push once  gabapentin 100 milliGRAM(s) Oral daily  heparin  Injectable 5000 Unit(s) SubCutaneous every 8 hours  insulin detemir injectable (LEVEMIR) 5 Unit(s) SubCutaneous two times a day  insulin lispro (HumaLOG) corrective regimen sliding scale   SubCutaneous three times a day before meals  insulin lispro (HumaLOG) corrective regimen sliding scale   SubCutaneous at bedtime  magnesium oxide 400 milliGRAM(s) Oral two times a day with meals  metoprolol succinate ER 25 milliGRAM(s) Oral daily  sodium chloride 0.9%. 1000 milliLiter(s) (70 mL/Hr) IV Continuous <Continuous>    MEDICATIONS  (PRN):  acetaminophen   Tablet .. 650 milliGRAM(s) Oral every 6 hours PRN Moderate Pain (4 - 6)  dextrose 40% Gel 15 Gram(s) Oral once PRN Blood Glucose LESS THAN 70 milliGRAM(s)/deciliter  glucagon  Injectable 1 milliGRAM(s) IntraMuscular once PRN Glucose LESS THAN 70 milligrams/deciliter        CAPILLARY BLOOD GLUCOSE      POCT Blood Glucose.: 158 mg/dL (2019 12:37)  POCT Blood Glucose.: 129 mg/dL (2019 08:46)  POCT Blood Glucose.: 175 mg/dL (2019 21:04)  POCT Blood Glucose.: 97 mg/dL (2019 16:48)    I&O's Summary      Vital Signs Last 24 Hrs  T(C): 36.3 (2019 13:39), Max: 36.7 (2019 20:59)  T(F): 97.4 (2019 13:39), Max: 98.1 (2019 20:59)  HR: 91 (2019 13:39) (85 - 91)  BP: 96/72 (2019 13:39) (96/72 - 125/58)  BP(mean): --  RR: 17 (2019 13:39) (16 - 18)  SpO2: 100% (2019 13:39) (100% - 100%)    PHYSICAL EXAM:  GENERAL: NAD, not in any apparent distress ,  HEAD:  Atraumatic, Normocephalic  NECK: Supple, No JVD, Normal thyroid  NERVOUS SYSTEM:  Alert & Oriented, No focal deficit   CHEST/LUNG: Good air entry bilateral with no  rales, rhonchi, wheezing, or rubs  HEART: Regular rate and rhythm; No murmurs, rubs, or gallops  ABDOMEN: Soft, Nontender, Nondistended; Bowel sounds present  EXTREMITIES:  tender to touch both feet but warm     LABS:                        9.3    5.54  )-----------( 256      ( 2019 05:00 )             29.1     04-19    137  |  103  |  22  ----------------------------<  84  4.8   |  23  |  1.64<H>    Ca    8.8      2019 05:00  Phos  3.1     04-19  Mg     1.9     -19    TPro  6.0  /  Alb  3.0<L>  /  TBili  0.8  /  DBili  x   /  AST  14  /  ALT  5   /  AlkPhos  72  04-18          Urinalysis Basic - ( 2019 22:10 )    Color: YELLOW / Appearance: HAZY / S.037 / pH: 6.5  Gluc: NEGATIVE / Ketone: NEGATIVE  / Bili: NEGATIVE / Urobili: NORMAL   Blood: TRACE / Protein: 50 / Nitrite: NEGATIVE   Leuk Esterase: MODERATE / RBC: x / WBC TNTC   Sq Epi: x / Non Sq Epi: SMALL / Bacteria: x          All consultant(s) notes reviewed and care discussed with other providers        Contact Number, Dr Fernando [phone]

## 2019-04-19 NOTE — PROGRESS NOTE ADULT - SUBJECTIVE AND OBJECTIVE BOX
S: Has persistent b/l foot pain. Patient denies chest pain or shortness of breath.   Review of systems otherwise (-)  	    MEDICATIONS  (STANDING):  atorvastatin 10 milliGRAM(s) Oral at bedtime  cilostazol 100 milliGRAM(s) Oral two times a day  dextrose 5%. 1000 milliLiter(s) (50 mL/Hr) IV Continuous <Continuous>  dextrose 50% Injectable 12.5 Gram(s) IV Push once  dextrose 50% Injectable 25 Gram(s) IV Push once  dextrose 50% Injectable 25 Gram(s) IV Push once  gabapentin 100 milliGRAM(s) Oral daily  heparin  Injectable 5000 Unit(s) SubCutaneous every 8 hours  insulin detemir injectable (LEVEMIR) 5 Unit(s) SubCutaneous two times a day  insulin lispro (HumaLOG) corrective regimen sliding scale   SubCutaneous three times a day before meals  insulin lispro (HumaLOG) corrective regimen sliding scale   SubCutaneous at bedtime  magnesium oxide 400 milliGRAM(s) Oral two times a day with meals  metoprolol succinate ER 25 milliGRAM(s) Oral daily  sodium chloride 0.9%. 1000 milliLiter(s) (75 mL/Hr) IV Continuous <Continuous>    MEDICATIONS  (PRN):  acetaminophen   Tablet .. 650 milliGRAM(s) Oral every 6 hours PRN Moderate Pain (4 - 6)  dextrose 40% Gel 15 Gram(s) Oral once PRN Blood Glucose LESS THAN 70 milliGRAM(s)/deciliter  glucagon  Injectable 1 milliGRAM(s) IntraMuscular once PRN Glucose LESS THAN 70 milligrams/deciliter      LABS:                            9.3    5.54  )-----------( 256      ( 19 Apr 2019 05:00 )             29.1     Hemoglobin: 9.3 g/dL (04-19 @ 05:00)  Hemoglobin: 9.5 g/dL (04-18 @ 05:54)  Hemoglobin: 10.5 g/dL (04-17 @ 06:00)  Hemoglobin: 10.5 g/dL (04-16 @ 09:20)    04-19    137  |  103  |  22  ----------------------------<  84  4.8   |  23  |  1.64<H>    Ca    8.8      19 Apr 2019 05:00  Phos  3.1     04-19  Mg     1.9     04-19    TPro  6.0  /  Alb  3.0<L>  /  TBili  0.8  /  DBili  x   /  AST  14  /  ALT  5   /  AlkPhos  72  04-18    Creatinine Trend: 1.64<--, 1.52<--, 1.18<--, 1.11<--, 1.25<--, 1.05<--       PHYSICAL EXAM  Vital Signs Last 24 Hrs  T(C): 36.7 (19 Apr 2019 06:18), Max: 36.7 (18 Apr 2019 20:59)  T(F): 98.1 (19 Apr 2019 06:18), Max: 98.1 (18 Apr 2019 20:59)  HR: 87 (19 Apr 2019 06:18) (73 - 87)  BP: 125/58 (19 Apr 2019 06:18) (92/40 - 125/58)  BP(mean): --  RR: 16 (19 Apr 2019 06:18) (16 - 18)  SpO2: 100% (19 Apr 2019 06:18) (94% - 100%)      Gen: Appears well in NAD  HEENT:  (-)icterus (-)pallor  CV: Normal S1 S2, No JVD, No murmurs  Resp:  Clear to auscultation B/L, normal effort  GI: (+) BS Soft, NT, ND  Lymph:  (-)Edema, (-)obvious lymphadenopathy  Skin: Warm to touch, Normal turgor  Psych: Appropriate mood and affect      ECG: SR, 1st degree AV block (noted on previous EKGs)    < from: Transthoracic Echocardiogram (10.31.18 @ 07:42) >  CONCLUSIONS:  1. Normal left ventricular internal dimensions and wall  thicknesses.  2. Normal left ventricular systolic function. No segmental  wall motion abnormalities.  3. Normal right ventricular size and function.  4. Normal tricuspid valve. Minimal tricuspid regurgitation.  *** Compared with echocardiogram of 10/19/2016, no  significant changes noted.    < end of copied text >    < from: Nuclear Stress Test-Pharmacologic (03.07.17 @ 10:30) >  IMPRESSIONS:  * No ECG evidence of ischemia  * Myocardial Perfusion SPECT results are abnormal.  * There is a medium sized, mild defect in anterior wall  that is partially reversible, suggestive of mild ischemia.  * The left ventricle was normal LV size.  * Post-stress resting myocardial perfusion gated SPECT  imaging was performed (LVEF > 70%;LVEDV = 47 ml.) and  showed normal wall motion.    *** Conclusions ***  There is SPECT evidence of mild ischemia during  Regadenoson  pharmacologic stress in the distribution of  LAD.    < end of copied text >      ASSESSMENT/PLAN: 89 y/o female with history of DM2, CKD 3, HTN, HLD, and OA who presents to the hospital with complaints of persistent b/l feet pain. Patient had JAMIE study done that showed that she had multilevel PAD of b/l legs. Cardiology consulted for PAD/HTN management.     -Not in clinical heart failure and no anginal symptoms  -Last TTE 10/31/18 noted above - normal LV systolic function  -Last Stress test in 2017 - noted to have mild anterior wall ischemia but was medically managed - however patient reports was taken off Aspirin due to her recurrent falls - Continue BB/Statin  -Continue toprol XL with parameters  -Follow up vascular plan regarding PAD management  -Patient optimized from CV perspective if plans for LE Angiogram S: Has persistent b/l foot pain. Patient denies chest pain or shortness of breath.   Review of systems otherwise (-)  	    MEDICATIONS  (STANDING):  atorvastatin 10 milliGRAM(s) Oral at bedtime  cilostazol 100 milliGRAM(s) Oral two times a day  dextrose 5%. 1000 milliLiter(s) (50 mL/Hr) IV Continuous <Continuous>  dextrose 50% Injectable 12.5 Gram(s) IV Push once  dextrose 50% Injectable 25 Gram(s) IV Push once  dextrose 50% Injectable 25 Gram(s) IV Push once  gabapentin 100 milliGRAM(s) Oral daily  heparin  Injectable 5000 Unit(s) SubCutaneous every 8 hours  insulin detemir injectable (LEVEMIR) 5 Unit(s) SubCutaneous two times a day  insulin lispro (HumaLOG) corrective regimen sliding scale   SubCutaneous three times a day before meals  insulin lispro (HumaLOG) corrective regimen sliding scale   SubCutaneous at bedtime  magnesium oxide 400 milliGRAM(s) Oral two times a day with meals  metoprolol succinate ER 25 milliGRAM(s) Oral daily  sodium chloride 0.9%. 1000 milliLiter(s) (75 mL/Hr) IV Continuous <Continuous>    MEDICATIONS  (PRN):  acetaminophen   Tablet .. 650 milliGRAM(s) Oral every 6 hours PRN Moderate Pain (4 - 6)  dextrose 40% Gel 15 Gram(s) Oral once PRN Blood Glucose LESS THAN 70 milliGRAM(s)/deciliter  glucagon  Injectable 1 milliGRAM(s) IntraMuscular once PRN Glucose LESS THAN 70 milligrams/deciliter      LABS:                            9.3    5.54  )-----------( 256      ( 19 Apr 2019 05:00 )             29.1     Hemoglobin: 9.3 g/dL (04-19 @ 05:00)  Hemoglobin: 9.5 g/dL (04-18 @ 05:54)  Hemoglobin: 10.5 g/dL (04-17 @ 06:00)  Hemoglobin: 10.5 g/dL (04-16 @ 09:20)    04-19    137  |  103  |  22  ----------------------------<  84  4.8   |  23  |  1.64<H>    Ca    8.8      19 Apr 2019 05:00  Phos  3.1     04-19  Mg     1.9     04-19    TPro  6.0  /  Alb  3.0<L>  /  TBili  0.8  /  DBili  x   /  AST  14  /  ALT  5   /  AlkPhos  72  04-18    Creatinine Trend: 1.64<--, 1.52<--, 1.18<--, 1.11<--, 1.25<--, 1.05<--       PHYSICAL EXAM  Vital Signs Last 24 Hrs  T(C): 36.7 (19 Apr 2019 06:18), Max: 36.7 (18 Apr 2019 20:59)  T(F): 98.1 (19 Apr 2019 06:18), Max: 98.1 (18 Apr 2019 20:59)  HR: 87 (19 Apr 2019 06:18) (73 - 87)  BP: 125/58 (19 Apr 2019 06:18) (92/40 - 125/58)  BP(mean): --  RR: 16 (19 Apr 2019 06:18) (16 - 18)  SpO2: 100% (19 Apr 2019 06:18) (94% - 100%)      Gen: Appears well in NAD  HEENT:  (-)icterus (-)pallor  CV: Normal S1 S2, No JVD, No murmurs  Resp:  Clear to auscultation B/L, normal effort  GI: (+) BS Soft, NT, ND  Lymph:  (-)Edema, (-)obvious lymphadenopathy  Skin: Warm to touch, Normal turgor  Psych: Appropriate mood and affect      ECG: SR, 1st degree AV block (noted on previous EKGs)    < from: Transthoracic Echocardiogram (10.31.18 @ 07:42) >  CONCLUSIONS:  1. Normal left ventricular internal dimensions and wall  thicknesses.  2. Normal left ventricular systolic function. No segmental  wall motion abnormalities.  3. Normal right ventricular size and function.  4. Normal tricuspid valve. Minimal tricuspid regurgitation.  *** Compared with echocardiogram of 10/19/2016, no  significant changes noted.    < end of copied text >    < from: Nuclear Stress Test-Pharmacologic (03.07.17 @ 10:30) >  IMPRESSIONS:  * No ECG evidence of ischemia  * Myocardial Perfusion SPECT results are abnormal.  * There is a medium sized, mild defect in anterior wall  that is partially reversible, suggestive of mild ischemia.  * The left ventricle was normal LV size.  * Post-stress resting myocardial perfusion gated SPECT  imaging was performed (LVEF > 70%;LVEDV = 47 ml.) and  showed normal wall motion.    *** Conclusions ***  There is SPECT evidence of mild ischemia during  Regadenoson  pharmacologic stress in the distribution of  LAD.    < end of copied text >      ASSESSMENT/PLAN: 89 y/o female with history of DM2, CKD 3, HTN, HLD, and OA who presents to the hospital with complaints of persistent b/l feet pain. Patient had JAMIE study done that showed that she had multilevel PAD of b/l legs. Cardiology consulted for PAD/HTN management.     -Not in clinical heart failure and no anginal symptoms  -Last TTE 10/31/18 noted above - normal LV systolic function  -Last Stress test in 2017 - noted to have mild anterior wall ischemia but was medically managed - however patient reports was taken off Aspirin due to her recurrent falls - Continue BB/Statin  -Continue toprol XL with parameters  -SVETA management per renal, Lisinopril on hold  -Follow up vascular plan regarding PAD management  -Patient optimized from CV perspective if plans for LE Angiogram

## 2019-04-20 ENCOUNTER — TRANSCRIPTION ENCOUNTER (OUTPATIENT)
Age: 84
End: 2019-04-20

## 2019-04-20 LAB
ALBUMIN SERPL ELPH-MCNC: 3.2 G/DL — LOW (ref 3.3–5)
ALP SERPL-CCNC: 79 U/L — SIGNIFICANT CHANGE UP (ref 40–120)
ALT FLD-CCNC: 9 U/L — SIGNIFICANT CHANGE UP (ref 4–33)
ANION GAP SERPL CALC-SCNC: 9 MMO/L — SIGNIFICANT CHANGE UP (ref 7–14)
AST SERPL-CCNC: 16 U/L — SIGNIFICANT CHANGE UP (ref 4–32)
BACTERIA UR CULT: SIGNIFICANT CHANGE UP
BILIRUB SERPL-MCNC: 0.7 MG/DL — SIGNIFICANT CHANGE UP (ref 0.2–1.2)
BUN SERPL-MCNC: 19 MG/DL — SIGNIFICANT CHANGE UP (ref 7–23)
CALCIUM SERPL-MCNC: 8.7 MG/DL — SIGNIFICANT CHANGE UP (ref 8.4–10.5)
CHLORIDE SERPL-SCNC: 100 MMOL/L — SIGNIFICANT CHANGE UP (ref 98–107)
CO2 SERPL-SCNC: 23 MMOL/L — SIGNIFICANT CHANGE UP (ref 22–31)
CREAT SERPL-MCNC: 1.51 MG/DL — HIGH (ref 0.5–1.3)
GLUCOSE BLDC GLUCOMTR-MCNC: 118 MG/DL — HIGH (ref 70–99)
GLUCOSE BLDC GLUCOMTR-MCNC: 134 MG/DL — HIGH (ref 70–99)
GLUCOSE BLDC GLUCOMTR-MCNC: 213 MG/DL — HIGH (ref 70–99)
GLUCOSE BLDC GLUCOMTR-MCNC: 89 MG/DL — SIGNIFICANT CHANGE UP (ref 70–99)
GLUCOSE SERPL-MCNC: 108 MG/DL — HIGH (ref 70–99)
HCT VFR BLD CALC: 28.6 % — LOW (ref 34.5–45)
HGB BLD-MCNC: 9.2 G/DL — LOW (ref 11.5–15.5)
MAGNESIUM SERPL-MCNC: 1.8 MG/DL — SIGNIFICANT CHANGE UP (ref 1.6–2.6)
MCHC RBC-ENTMCNC: 28.7 PG — SIGNIFICANT CHANGE UP (ref 27–34)
MCHC RBC-ENTMCNC: 32.2 % — SIGNIFICANT CHANGE UP (ref 32–36)
MCV RBC AUTO: 89.1 FL — SIGNIFICANT CHANGE UP (ref 80–100)
NRBC # FLD: 0.03 K/UL — SIGNIFICANT CHANGE UP (ref 0–0)
PHOSPHATE SERPL-MCNC: 3 MG/DL — SIGNIFICANT CHANGE UP (ref 2.5–4.5)
PLATELET # BLD AUTO: 268 K/UL — SIGNIFICANT CHANGE UP (ref 150–400)
PMV BLD: 10.1 FL — SIGNIFICANT CHANGE UP (ref 7–13)
POTASSIUM SERPL-MCNC: 4.9 MMOL/L — SIGNIFICANT CHANGE UP (ref 3.5–5.3)
POTASSIUM SERPL-SCNC: 4.9 MMOL/L — SIGNIFICANT CHANGE UP (ref 3.5–5.3)
PROT SERPL-MCNC: 6.5 G/DL — SIGNIFICANT CHANGE UP (ref 6–8.3)
RBC # BLD: 3.21 M/UL — LOW (ref 3.8–5.2)
RBC # FLD: 15.6 % — HIGH (ref 10.3–14.5)
SODIUM SERPL-SCNC: 132 MMOL/L — LOW (ref 135–145)
SPECIMEN SOURCE: SIGNIFICANT CHANGE UP
WBC # BLD: 4.93 K/UL — SIGNIFICANT CHANGE UP (ref 3.8–10.5)
WBC # FLD AUTO: 4.93 K/UL — SIGNIFICANT CHANGE UP (ref 3.8–10.5)

## 2019-04-20 RX ORDER — ASPIRIN/CALCIUM CARB/MAGNESIUM 324 MG
1 TABLET ORAL
Qty: 0 | Refills: 0 | DISCHARGE
Start: 2019-04-20

## 2019-04-20 RX ORDER — CILOSTAZOL 100 MG/1
1 TABLET ORAL
Qty: 0 | Refills: 0 | DISCHARGE
Start: 2019-04-20

## 2019-04-20 RX ADMIN — Medication 650 MILLIGRAM(S): at 06:27

## 2019-04-20 RX ADMIN — MAGNESIUM OXIDE 400 MG ORAL TABLET 400 MILLIGRAM(S): 241.3 TABLET ORAL at 17:21

## 2019-04-20 RX ADMIN — HEPARIN SODIUM 5000 UNIT(S): 5000 INJECTION INTRAVENOUS; SUBCUTANEOUS at 13:00

## 2019-04-20 RX ADMIN — Medication 81 MILLIGRAM(S): at 12:55

## 2019-04-20 RX ADMIN — CILOSTAZOL 100 MILLIGRAM(S): 100 TABLET ORAL at 05:20

## 2019-04-20 RX ADMIN — CILOSTAZOL 100 MILLIGRAM(S): 100 TABLET ORAL at 17:21

## 2019-04-20 RX ADMIN — Medication 5 UNIT(S): at 10:10

## 2019-04-20 RX ADMIN — Medication 2: at 12:53

## 2019-04-20 RX ADMIN — GABAPENTIN 100 MILLIGRAM(S): 400 CAPSULE ORAL at 12:55

## 2019-04-20 RX ADMIN — HEPARIN SODIUM 5000 UNIT(S): 5000 INJECTION INTRAVENOUS; SUBCUTANEOUS at 05:20

## 2019-04-20 RX ADMIN — Medication 5 UNIT(S): at 22:39

## 2019-04-20 RX ADMIN — Medication 25 MILLIGRAM(S): at 05:20

## 2019-04-20 RX ADMIN — MAGNESIUM OXIDE 400 MG ORAL TABLET 400 MILLIGRAM(S): 241.3 TABLET ORAL at 10:10

## 2019-04-20 RX ADMIN — Medication 650 MILLIGRAM(S): at 05:19

## 2019-04-20 NOTE — DISCHARGE NOTE PROVIDER - NSDCCPCAREPLAN_GEN_ALL_CORE_FT
PRINCIPAL DISCHARGE DIAGNOSIS  Diagnosis: Foot pain  Assessment and Plan of Treatment: You presented to the emergency department with pain in your bilateral feet. Vascular team was consulted. You declined a Angiogram with angioplasty as recommended by vascular team. Aspirin and cilostazol was started in the hospital. Please continue as instructed. Please follow up with your primary care provider and cardiologist within 1 week of discharge from the hospital. Continue your medications as directed and please follow-up as an outpatient with your primary care provider for further care and recommendations.      SECONDARY DISCHARGE DIAGNOSES  Diagnosis: Peripheral arterial disease  Assessment and Plan of Treatment: Aspirin and cilostazol was started in the hospital. Please continue as instructed. Please follow up with your primary care provider and cardiologist within 1 week of discharge from the hospital. Continue your medications as directed and please follow-up as an outpatient with your primary care provider for further care and recommendations.    Diagnosis: Essential hypertension  Assessment and Plan of Treatment: Continue blood pressure medication regimen as directed. Monitor for any visual changes, headaches or dizziness.  Monitor blood pressure regularly.  Follow up with your PCP for further management for high blood pressure.    Diagnosis: Type 2 diabetes mellitus with hyperglycemia, with long-term current use of insulin  Assessment and Plan of Treatment: Continue your medication regimen and a consistent carbohydrate diet (Meaning eating the same amount of carbohydrates at the same time each day). Monitor blood glucose levels throughout the day before meals and at bedtime. Record blood sugars and bring to outpatient providers appointment in order to be reviewed by your doctor for management modifications. Monitor for signs/symptoms of low blood glucose, such as, dizziness, altered mental status, or cool/clammy skin. In addition, monitor for signs/symptoms of high blood glucose, such as, feeling hot, dry, fatigued, or with increased thirst/urination. Make regular podiatry appointments in order to have feet checked for wounds and uncontrolled toe nail growth to prevent infections, as well as, appointments with an ophthalmologist to monitor your vision.    Diagnosis: Hyperlipidemia, unspecified hyperlipidemia type  Assessment and Plan of Treatment: Continue cholesterol control medications. Continue DASH diet. Follow up with your PCP within 1 week of discharge for further management and monitoring of lipid and cholesterol panels.    Diagnosis: Stage III chronic kidney disease  Assessment and Plan of Treatment: Your creatinine had increased during your hospital stay. You were given some IV fluids and an improvement in your cretinine was seen. You will need repeat CBC and CMP within 1 week of discharge from the hosptial. Please follow up with your nephrologist and/or priamry care provider as outpatient wihtin one week of discahrge from the hospital. PRINCIPAL DISCHARGE DIAGNOSIS  Diagnosis: Foot pain  Assessment and Plan of Treatment: You presented to the emergency department with pain in both your feet. Vascular team was consulted. You declined an Angiogram with angioplasty as recommended by vascular team. Aspirin and cilostazol was started in the hospital. Please continue as instructed. Please follow up with your primary care provider and cardiologist within 1 week of discharge from the hospital. Continue your medications as directed and please follow-up as an outpatient with your primary care provider for further care and recommendations.      SECONDARY DISCHARGE DIAGNOSES  Diagnosis: Hyperlipidemia, unspecified hyperlipidemia type  Assessment and Plan of Treatment: Continue cholesterol control medications. Continue DASH diet. Follow up with your PCP within 1 week of discharge for further management and monitoring of lipid and cholesterol panels.    Diagnosis: Stage III chronic kidney disease  Assessment and Plan of Treatment: Your creatinine had increased during your hospital stay. You were given some IV fluids and an improvement in your creatinine was seen. You will need repeat CBC and CMP within 1 week of discharge from the hosptial. Please follow up with your nephrologist and/or primary care provider as outpatient wihtin one week of discharge from the hospital.    Diagnosis: Essential hypertension  Assessment and Plan of Treatment: Continue blood pressure medication regimen as directed. Monitor for any visual changes, headaches or dizziness.  Monitor blood pressure regularly.  Follow up with your PCP for further management for high blood pressure.    Diagnosis: Type 2 diabetes mellitus with hyperglycemia, with long-term current use of insulin  Assessment and Plan of Treatment: Continue your medication regimen and a consistent carbohydrate diet (Meaning eating the same amount of carbohydrates at the same time each day). Monitor blood glucose levels throughout the day before meals and at bedtime. Record blood sugars and bring to outpatient providers appointment in order to be reviewed by your doctor for management modifications. Monitor for signs/symptoms of low blood glucose, such as, dizziness, altered mental status, or cool/clammy skin. In addition, monitor for signs/symptoms of high blood glucose, such as, feeling hot, dry, fatigued, or with increased thirst/urination. Make regular podiatry appointments in order to have feet checked for wounds and uncontrolled toe nail growth to prevent infections, as well as, appointments with an ophthalmologist to monitor your vision.    Diagnosis: Peripheral arterial disease  Assessment and Plan of Treatment: Aspirin and cilostazol was started in the hospital. Please continue as instructed. Please follow up with your primary care provider and cardiologist within 1 week of discharge from the hospital. Continue your medications as directed and please follow-up as an outpatient with your primary care provider for further care and recommendations.

## 2019-04-20 NOTE — PROGRESS NOTE ADULT - SUBJECTIVE AND OBJECTIVE BOX
SUBJECTIVE:  Patient denies chest pain or shortness of breath.   Review of systems otherwise (-)      acetaminophen   Tablet .. 650 milliGRAM(s) Oral every 6 hours PRN  aspirin  chewable 81 milliGRAM(s) Oral daily  atorvastatin 10 milliGRAM(s) Oral at bedtime  cilostazol 100 milliGRAM(s) Oral two times a day  dextrose 40% Gel 15 Gram(s) Oral once PRN  dextrose 5%. 1000 milliLiter(s) IV Continuous <Continuous>  dextrose 50% Injectable 12.5 Gram(s) IV Push once  dextrose 50% Injectable 25 Gram(s) IV Push once  dextrose 50% Injectable 25 Gram(s) IV Push once  gabapentin 100 milliGRAM(s) Oral daily  glucagon  Injectable 1 milliGRAM(s) IntraMuscular once PRN  heparin  Injectable 5000 Unit(s) SubCutaneous every 8 hours  insulin detemir injectable (LEVEMIR) 5 Unit(s) SubCutaneous two times a day  insulin lispro (HumaLOG) corrective regimen sliding scale   SubCutaneous three times a day before meals  insulin lispro (HumaLOG) corrective regimen sliding scale   SubCutaneous at bedtime  magnesium oxide 400 milliGRAM(s) Oral two times a day with meals  metoprolol succinate ER 25 milliGRAM(s) Oral daily  sodium chloride 0.9%. 1000 milliLiter(s) IV Continuous <Continuous>                        9.2    4.93  )-----------( 268      ( 20 Apr 2019 06:14 )             28.6     Hemoglobin: 9.2 g/dL (04-20 @ 06:14)  Hemoglobin: 9.3 g/dL (04-19 @ 05:00)  Hemoglobin: 9.5 g/dL (04-18 @ 05:54)  Hemoglobin: 10.5 g/dL (04-17 @ 06:00)  Hemoglobin: 10.5 g/dL (04-16 @ 09:20)    04-20    132<L>  |  100  |  19  ----------------------------<  108<H>  4.9   |  23  |  1.51<H>    Ca    8.7      20 Apr 2019 06:14  Phos  3.0     04-20  Mg     1.8     04-20    TPro  6.5  /  Alb  3.2<L>  /  TBili  0.7  /  DBili  x   /  AST  16  /  ALT  9   /  AlkPhos  79  04-20    Creatinine Trend: 1.51<--, 1.64<--, 1.52<--, 1.18<--, 1.11<--, 1.25<--    COAGS:     T(C): 36.7 (04-20-19 @ 13:25), Max: 36.7 (04-20-19 @ 13:25)  HR: 71 (04-20-19 @ 13:25) (71 - 83)  BP: 141/60 (04-20-19 @ 13:25) (118/73 - 157/57)  RR: 18 (04-20-19 @ 13:25) (18 - 18)  SpO2: 100% (04-20-19 @ 13:25) (100% - 100%)  Wt(kg): --  66.9    I&O's Summary    Gen: Appears well in NAD  HEENT:  (-)icterus (-)pallor  CV: Normal S1 S2, No JVD, No murmurs  Resp:  Clear to auscultation B/L, normal effort  GI: (+) BS Soft, NT, ND  Lymph:  (-)Edema, (-)obvious lymphadenopathy  Skin: Warm to touch, Normal turgor  Psych: Appropriate mood and affect      ECG: SR, 1st degree AV block (noted on previous EKGs)    < from: Transthoracic Echocardiogram (10.31.18 @ 07:42) >  CONCLUSIONS:  1. Normal left ventricular internal dimensions and wall  thicknesses.  2. Normal left ventricular systolic function. No segmental  wall motion abnormalities.  3. Normal right ventricular size and function.  4. Normal tricuspid valve. Minimal tricuspid regurgitation.  *** Compared with echocardiogram of 10/19/2016, no  significant changes noted.    < end of copied text >    < from: Nuclear Stress Test-Pharmacologic (03.07.17 @ 10:30) >  IMPRESSIONS:  * No ECG evidence of ischemia  * Myocardial Perfusion SPECT results are abnormal.  * There is a medium sized, mild defect in anterior wall  that is partially reversible, suggestive of mild ischemia.  * The left ventricle was normal LV size.  * Post-stress resting myocardial perfusion gated SPECT  imaging was performed (LVEF > 70%;LVEDV = 47 ml.) and  showed normal wall motion.    *** Conclusions ***  There is SPECT evidence of mild ischemia during  Regadenoson  pharmacologic stress in the distribution of  LAD.    < end of copied text >      ASSESSMENT/PLAN:     89 y/o female with history of DM2, CKD 3, HTN, HLD, and OA who presents to the hospital with complaints of persistent b/l feet pain. Patient had JAMIE study done that showed that she had multilevel PAD of b/l legs. Cardiology consulted for PAD/HTN management.     -Not in clinical heart failure and no anginal symptoms  -Last TTE 10/31/18 noted above with normal LV systolic function  -Last Stress test in 2017 - noted to have mild anterior wall ischemia but was medically managed  -off ASA for frequent falls, continue BB/Statin   -Nephrology note appreciated   -Follow up vascular plan regarding PAD management  -Patient optimized from CV perspective if plans for LE Angiogram

## 2019-04-20 NOTE — DISCHARGE NOTE PROVIDER - CARE PROVIDER_API CALL
Ashleigh Olivia)  Internal Medicine  [address] [place] [address]  Phone: [phone]  Fax: [phone]  Follow Up Time:     Marino Trinidad)  Cardiovascular Disease  [address] [facility] [address]  [place] [address]  Phone: [phone]  Fax: [phone]  Follow Up Time:     Donna Guerra)  Internal Medicine; Nephrology  [address] [address]  Phone: [phone]  Fax: [phone]  Follow Up Time:

## 2019-04-20 NOTE — PROGRESS NOTE ADULT - SUBJECTIVE AND OBJECTIVE BOX
NEPHROLOGY - [facility] (formerly known as [facility])    Patient seen and examined.    MEDICATIONS  (STANDING):  aspirin  chewable 81 milliGRAM(s) Oral daily  atorvastatin 10 milliGRAM(s) Oral at bedtime  cilostazol 100 milliGRAM(s) Oral two times a day  gabapentin 100 milliGRAM(s) Oral daily  heparin  Injectable 5000 Unit(s) SubCutaneous every 8 hours  magnesium oxide 400 milliGRAM(s) Oral two times a day with meals  metoprolol succinate ER 25 milliGRAM(s) Oral daily  sodium chloride 0.9%. 1000 milliLiter(s) (70 mL/Hr) IV Continuous <Continuous>    VITALS:  T(C): , Max: 36.7 (19 @ 13:25)  T(F): , Max: 98 (19 @ 13:25)  HR: 71 (19 @ 13:25)  BP: 141/60 (19 @ 13:25)  RR: 18 (19 @ 13:25)  SpO2: 100% (19 @ 13:25)    REVIEW OF SYSTEMS:  Full ROS done and were negative unless otherwise indicated in HPI/assessment.     PHYSICAL EXAM:  Constitutional: NAD  Respiratory: CTA B/L  Cardiovascular: S1 and S2, RRR  Gastrointestinal: + BS, soft, NT, ND  Extremities: no peripheral edema  Neurological: awake and interactive  : no lorenzana    LABS:                        9.2    4.93  )-----------( 268      ( 2019 06:14 )             28.6     04-20    132<L>  |  100  |  19  ----------------------------<  108<H>  4.9   |  23  |  1.51<H>    Ca    8.7      2019 06:14  Phos  3.0     04-20  Mg     1.8     04-20    TPro  6.5  /  Alb  3.2<L>  /  TBili  0.7  /  DBili  x   /  AST  16  /  ALT  9   /  AlkPhos  79  04-20    Urine Studies:  Urinalysis Basic - ( 2019 22:10 )    Color: YELLOW / Appearance: HAZY / S.037 / pH: 6.5  Gluc: NEGATIVE / Ketone: NEGATIVE  / Bili: NEGATIVE / Urobili: NORMAL   Blood: TRACE / Protein: 50 / Nitrite: NEGATIVE   Leuk Esterase: MODERATE / RBC: x / WBC TNTC   Sq Epi: x / Non Sq Epi: SMALL / Bacteria: x    ASSESSMENT  89F w/ DM, HTN, HLD, OA, PAD, hypomagnesemia, CKD3 p/w b/l foot pain L>R ()  - CKD: stage 3 due to DM/HTN  - SVETA; due to RAAS, hypotension +/- contrast - improving  - HTN: BP acceptable  - PAD: b/l LE, painful, S/P CTA   - hypomagnesemia: controlled     RECOMMEND  - continue mag ox 400 mg po daily in am  - hold RAAS inhibitors   - vascular f/u?  - dose meds for a GFR ~ 30-40 mL/min    Franchesca Zee NP  [facility] ([facility], )  [phone] NEPHROLOGY - [facility] (formerly known as [facility])    Patient seen and examined.  NAD     MEDICATIONS  (STANDING):  aspirin  chewable 81 milliGRAM(s) Oral daily  atorvastatin 10 milliGRAM(s) Oral at bedtime  cilostazol 100 milliGRAM(s) Oral two times a day  gabapentin 100 milliGRAM(s) Oral daily  heparin  Injectable 5000 Unit(s) SubCutaneous every 8 hours  magnesium oxide 400 milliGRAM(s) Oral two times a day with meals  metoprolol succinate ER 25 milliGRAM(s) Oral daily  sodium chloride 0.9%. 1000 milliLiter(s) (70 mL/Hr) IV Continuous <Continuous>    VITALS:  T(C): , Max: 36.7 (19 @ 13:25)  T(F): , Max: 98 (19 @ 13:25)  HR: 71 (19 @ 13:25)  BP: 141/60 (19 @ 13:25)  RR: 18 (19 @ 13:25)  SpO2: 100% (19 @ 13:25)    REVIEW OF SYSTEMS:  Full ROS done and were negative unless otherwise indicated in HPI/assessment.     PHYSICAL EXAM:  Constitutional: NAD  Respiratory: CTA B/L  Cardiovascular: S1 and S2, RRR  Gastrointestinal: + BS, soft, NT, ND  Extremities: no peripheral edema  Neurological: awake and interactive  : no lorenzana    LABS:                        9.2    4.93  )-----------( 268      ( 2019 06:14 )             28.6     04-20    132<L>  |  100  |  19  ----------------------------<  108<H>  4.9   |  23  |  1.51<H>    Ca    8.7      2019 06:14  Phos  3.0     04-20  Mg     1.8     04-20    TPro  6.5  /  Alb  3.2<L>  /  TBili  0.7  /  DBili  x   /  AST  16  /  ALT  9   /  AlkPhos  79  04-20    Urine Studies:  Urinalysis Basic - ( 2019 22:10 )    Color: YELLOW / Appearance: HAZY / S.037 / pH: 6.5  Gluc: NEGATIVE / Ketone: NEGATIVE  / Bili: NEGATIVE / Urobili: NORMAL   Blood: TRACE / Protein: 50 / Nitrite: NEGATIVE   Leuk Esterase: MODERATE / RBC: x / WBC TNTC   Sq Epi: x / Non Sq Epi: SMALL / Bacteria: x    ASSESSMENT  89F w/ DM, HTN, HLD, OA, PAD, hypomagnesemia, CKD3 p/w b/l foot pain L>R ()  - CKD: stage 3 due to DM/HTN  - SVETA; due to RAAS, hypotension +/- contrast - improving  - HTN: BP acceptable  - PAD: b/l LE, painful, S/P CTA   - hypomagnesemia: controlled     RECOMMEND  - continue mag ox 400 mg po daily in am  - f/u urine studies (please send)  - defer further IVF; encourage PO intake  - hold RAAS inhibitors   - vascular f/u  - dose meds for a GFR ~ 30-40 mL/min    Franchesca Zee NP  [facility] ([facility], )  [phone]

## 2019-04-20 NOTE — DISCHARGE NOTE PROVIDER - INSTRUCTIONS
Soft diet  Consistent carbohydrates   Sodium and cholesterol restricted  1 Glucerna shake twice a day

## 2019-04-20 NOTE — PROGRESS NOTE ADULT - ATTENDING COMMENTS
CARDIOLOGY ATTENDING    Agree with above. Continue current medical therapy for known PAD. F/U vascular surgery

## 2019-04-20 NOTE — DISCHARGE NOTE PROVIDER - PROVIDER TOKENS
PROVIDER:[TOKEN:[[id number]]],PROVIDER:[TOKEN:[[id number]]],PROVIDER:[TOKEN:[[id number]]]

## 2019-04-20 NOTE — PROGRESS NOTE ADULT - SUBJECTIVE AND OBJECTIVE BOX
Patient is a 89y old  Female who presents with a chief complaint of b/l foot pain (2019 13:38)      SUBJECTIVE / OVERNIGHT EVENTS: no overnight events  comfortable no apparent distress       ROS:  Resp: No cough no sputum production  CVS: No chest pain no palpitations no orthopnea  GI: no N/V/D  : no dysuria, no hematuria  Neuro: no weakness no paresthesias  Heme: No petechiae no easy bruising  Msk: No joint pain no swelling  Skin: No rash no itching        MEDICATIONS  (STANDING):  aspirin  chewable 81 milliGRAM(s) Oral daily  atorvastatin 10 milliGRAM(s) Oral at bedtime  cilostazol 100 milliGRAM(s) Oral two times a day  dextrose 5%. 1000 milliLiter(s) (50 mL/Hr) IV Continuous <Continuous>  dextrose 50% Injectable 12.5 Gram(s) IV Push once  dextrose 50% Injectable 25 Gram(s) IV Push once  dextrose 50% Injectable 25 Gram(s) IV Push once  gabapentin 100 milliGRAM(s) Oral daily  heparin  Injectable 5000 Unit(s) SubCutaneous every 8 hours  insulin detemir injectable (LEVEMIR) 5 Unit(s) SubCutaneous two times a day  insulin lispro (HumaLOG) corrective regimen sliding scale   SubCutaneous three times a day before meals  insulin lispro (HumaLOG) corrective regimen sliding scale   SubCutaneous at bedtime  magnesium oxide 400 milliGRAM(s) Oral two times a day with meals  metoprolol succinate ER 25 milliGRAM(s) Oral daily    MEDICATIONS  (PRN):  acetaminophen   Tablet .. 650 milliGRAM(s) Oral every 6 hours PRN Moderate Pain (4 - 6)  dextrose 40% Gel 15 Gram(s) Oral once PRN Blood Glucose LESS THAN 70 milliGRAM(s)/deciliter  glucagon  Injectable 1 milliGRAM(s) IntraMuscular once PRN Glucose LESS THAN 70 milligrams/deciliter        CAPILLARY BLOOD GLUCOSE      POCT Blood Glucose.: 213 mg/dL (2019 12:41)  POCT Blood Glucose.: 118 mg/dL (2019 08:38)  POCT Blood Glucose.: 181 mg/dL (2019 22:21)  POCT Blood Glucose.: 227 mg/dL (2019 20:55)  POCT Blood Glucose.: 195 mg/dL (2019 17:24)    I&O's Summary      Vital Signs Last 24 Hrs  T(C): 36.7 (2019 13:25), Max: 36.7 (2019 13:25)  T(F): 98 (2019 13:25), Max: 98 (2019 13:25)  HR: 71 (2019 13:25) (71 - 83)  BP: 141/60 (2019 13:25) (118/73 - 157/57)  BP(mean): --  RR: 18 (2019 13:25) (18 - 18)  SpO2: 100% (2019 13:25) (100% - 100%)    PHYSICAL EXAM:  GENERAL: NAD, not in any apparent distress ,  HEAD:  Atraumatic, Normocephalic  NECK: Supple, No JVD, Normal thyroid  NERVOUS SYSTEM:  Alert & Oriented, No focal deficit   CHEST/LUNG: Good air entry bilateral with no  rales, rhonchi, wheezing, or rubs  HEART: Regular rate and rhythm; No murmurs, rubs, or gallops  ABDOMEN: Soft, Nontender, Nondistended; Bowel sounds present  EXTREMITIES:  no tenderness, warm     LABS:                        9.2    4.93  )-----------( 268      ( 2019 06:14 )             28.6     04-20    132<L>  |  100  |  19  ----------------------------<  108<H>  4.9   |  23  |  1.51<H>    Ca    8.7      2019 06:14  Phos  3.0     04-20  Mg     1.8     04-20    TPro  6.5  /  Alb  3.2<L>  /  TBili  0.7  /  DBili  x   /  AST  16  /  ALT  9   /  AlkPhos  79  04-20          Urinalysis Basic - ( 2019 22:10 )    Color: YELLOW / Appearance: HAZY / S.037 / pH: 6.5  Gluc: NEGATIVE / Ketone: NEGATIVE  / Bili: NEGATIVE / Urobili: NORMAL   Blood: TRACE / Protein: 50 / Nitrite: NEGATIVE   Leuk Esterase: MODERATE / RBC: x / WBC TNTC   Sq Epi: x / Non Sq Epi: SMALL / Bacteria: x          All consultant(s) notes reviewed and care discussed with other providers        Contact Number, Dr Fernando [phone]

## 2019-04-20 NOTE — DISCHARGE NOTE PROVIDER - HOSPITAL COURSE
89W with history as above who presents to the hospital with complaints of b/l feet pain found to have b/l multilevel PAD.        Pain in both feet    - Vascular consulted recs  Angiogram with angioplasty, however patient refusing    - cilostazol (no history of heart failure) and aspirin started     - Tylenol prn during hospitalization     - c/w gabapentin.            Peripheral arterial disease    - CT abdominal angio: The three-vessel runoff is limited due to heavy atherosclerotic     calcifications. Complete long segment occlusion of the right superficial femoral artery  at the level of the right mid thigh with reconstitution from collateral  vessels at the level of the distal right superficial femoral artery.    - Patient started on ASA 81 po qd            Type 2 diabetes mellitus with hyperglycemia, with long-term current use of insulin.      continue finger sticks with short acting insulin sliding scale    -  Sugars in good control during hospitalization     - Resume levemir upon discharge             Hyperlipidemia    - atorvastatin 10mg qhs        Essential hypertension.  Plan: - lisinopril as therapeutic interchange for home benazepril    - c/w home metoprolol.     - Resume Benazapril upon discharge         SVETA likely hemodynamic vs MARIS    will continue to monitor    s/p NS with improvement in Creatinine         UCX:     UA POSITIVE FOR LEUKOESTERASE AND CT SCAN SHOWS BLADDER THICKENING, Patient asymptomatic, UCX contaminated             PT recs: rehab placement, however patient adamantly refusing Subacute Rehab. Patietn understands risks. Will discharge patient to home with home PT and services             As per medical attendign patient is medically stable for discharge    DISPO: PT recs: rehab placement, however patient adamantly refusing Subacute Rehab. Patietn understands risks. Will discharge patient to home with home PT and services

## 2019-04-21 LAB
ANION GAP SERPL CALC-SCNC: 13 MMO/L — SIGNIFICANT CHANGE UP (ref 7–14)
BUN SERPL-MCNC: 19 MG/DL — SIGNIFICANT CHANGE UP (ref 7–23)
CALCIUM SERPL-MCNC: 9.3 MG/DL — SIGNIFICANT CHANGE UP (ref 8.4–10.5)
CHLORIDE SERPL-SCNC: 103 MMOL/L — SIGNIFICANT CHANGE UP (ref 98–107)
CO2 SERPL-SCNC: 22 MMOL/L — SIGNIFICANT CHANGE UP (ref 22–31)
CREAT ?TM UR-MCNC: 44.8 MG/DL — SIGNIFICANT CHANGE UP
CREAT SERPL-MCNC: 1.7 MG/DL — HIGH (ref 0.5–1.3)
GLUCOSE BLDC GLUCOMTR-MCNC: 134 MG/DL — HIGH (ref 70–99)
GLUCOSE BLDC GLUCOMTR-MCNC: 179 MG/DL — HIGH (ref 70–99)
GLUCOSE BLDC GLUCOMTR-MCNC: 87 MG/DL — SIGNIFICANT CHANGE UP (ref 70–99)
GLUCOSE BLDC GLUCOMTR-MCNC: 99 MG/DL — SIGNIFICANT CHANGE UP (ref 70–99)
GLUCOSE SERPL-MCNC: 79 MG/DL — SIGNIFICANT CHANGE UP (ref 70–99)
MAGNESIUM SERPL-MCNC: 1.7 MG/DL — SIGNIFICANT CHANGE UP (ref 1.6–2.6)
POTASSIUM SERPL-MCNC: 4.9 MMOL/L — SIGNIFICANT CHANGE UP (ref 3.5–5.3)
POTASSIUM SERPL-SCNC: 4.9 MMOL/L — SIGNIFICANT CHANGE UP (ref 3.5–5.3)
PROT UR-MCNC: 6.9 MG/DL — SIGNIFICANT CHANGE UP
SODIUM SERPL-SCNC: 138 MMOL/L — SIGNIFICANT CHANGE UP (ref 135–145)

## 2019-04-21 RX ORDER — SODIUM CHLORIDE 9 MG/ML
1000 INJECTION INTRAMUSCULAR; INTRAVENOUS; SUBCUTANEOUS
Qty: 0 | Refills: 0 | Status: DISCONTINUED | OUTPATIENT
Start: 2019-04-21 | End: 2019-04-23

## 2019-04-21 RX ADMIN — HEPARIN SODIUM 5000 UNIT(S): 5000 INJECTION INTRAVENOUS; SUBCUTANEOUS at 22:30

## 2019-04-21 RX ADMIN — Medication 650 MILLIGRAM(S): at 15:12

## 2019-04-21 RX ADMIN — Medication 25 MILLIGRAM(S): at 05:59

## 2019-04-21 RX ADMIN — Medication 5 UNIT(S): at 09:21

## 2019-04-21 RX ADMIN — MAGNESIUM OXIDE 400 MG ORAL TABLET 400 MILLIGRAM(S): 241.3 TABLET ORAL at 12:18

## 2019-04-21 RX ADMIN — Medication 1: at 12:18

## 2019-04-21 RX ADMIN — SODIUM CHLORIDE 50 MILLILITER(S): 9 INJECTION INTRAMUSCULAR; INTRAVENOUS; SUBCUTANEOUS at 12:17

## 2019-04-21 RX ADMIN — GABAPENTIN 100 MILLIGRAM(S): 400 CAPSULE ORAL at 12:18

## 2019-04-21 RX ADMIN — CILOSTAZOL 100 MILLIGRAM(S): 100 TABLET ORAL at 05:59

## 2019-04-21 RX ADMIN — CILOSTAZOL 100 MILLIGRAM(S): 100 TABLET ORAL at 17:16

## 2019-04-21 RX ADMIN — HEPARIN SODIUM 5000 UNIT(S): 5000 INJECTION INTRAVENOUS; SUBCUTANEOUS at 05:59

## 2019-04-21 RX ADMIN — Medication 650 MILLIGRAM(S): at 16:00

## 2019-04-21 RX ADMIN — HEPARIN SODIUM 5000 UNIT(S): 5000 INJECTION INTRAVENOUS; SUBCUTANEOUS at 15:12

## 2019-04-21 RX ADMIN — Medication 5 UNIT(S): at 22:30

## 2019-04-21 RX ADMIN — ATORVASTATIN CALCIUM 10 MILLIGRAM(S): 80 TABLET, FILM COATED ORAL at 22:30

## 2019-04-21 RX ADMIN — Medication 81 MILLIGRAM(S): at 12:18

## 2019-04-21 NOTE — PROGRESS NOTE ADULT - SUBJECTIVE AND OBJECTIVE BOX
NEPHROLOGY - [facility] (formerly known as [facility])    Patient seen and examined.    MEDICATIONS  (STANDING):  aspirin  chewable 81 milliGRAM(s) Oral daily  atorvastatin 10 milliGRAM(s) Oral at bedtime  cilostazol 100 milliGRAM(s) Oral two times a day  gabapentin 100 milliGRAM(s) Oral daily  heparin  Injectable 5000 Unit(s) SubCutaneous every 8 hours  magnesium oxide 400 milliGRAM(s) Oral daily  metoprolol succinate ER 25 milliGRAM(s) Oral daily  sodium chloride 0.9%. 1000 milliLiter(s) (50 mL/Hr) IV Continuous <Continuous>    VITALS:  T(C): , Max: 36.7 (04-20-19 @ 13:25)  T(F): , Max: 98 (04-20-19 @ 13:25)  HR: 70 (04-21-19 @ 05:58)  BP: 123/55 (04-21-19 @ 05:58)  RR: 18 (04-21-19 @ 05:58)  SpO2: 96% (04-21-19 @ 05:58)    PHYSICAL EXAM:  Constitutional: NAD  Respiratory: CTA B/L  Cardiovascular: S1 and S2, RRR  Gastrointestinal: + BS, soft, NT, ND  Extremities: no peripheral edema  Neurological: forgetful  : no lorenzana    LABS:                        9.2    4.93  )-----------( 268      ( 20 Apr 2019 06:14 )             28.6     04-21    138  |  103  |  19  ----------------------------<  79  4.9   |  22  |  1.70<H>    Ca    9.3      21 Apr 2019 05:30  Phos  3.0     04-20  Mg     1.7     04-21    TPro  6.5  /  Alb  3.2<L>  /  TBili  0.7  /  DBili  x   /  AST  16  /  ALT  9   /  AlkPhos  79  04-20    Urine Studies:    Creatinine, Random Urine: 44.80 mg/dL (04-21 @ 08:30)  Protein, Random Urine: 6.9 mg/dL (04-21 @ 08:30)    ASSESSMENT  89F w/ DM, HTN, HLD, OA, PAD, hypomagnesemia, CKD3 p/w b/l foot pain L>R (4/16)  - CKD: stage 3 due to DM/HTN  - SVETA; due to RAAS, hypotension +/- contrast - azotemia rising  - HTN: BP acceptable  - PAD: b/l LE, painful, S/P CTA - no vascular intervention  - hypomagnesemia: declining but supplemented    RECOMMEND  - continue mag ox 400 mg po daily   - a/w ivf ns @ 50 mL/hr  - hold RAAS inhibitors   - dose meds for a GFR ~ 30-40 mL/min    Franchesca Zee NP  [facility] ([facility], )  [phone] NEPHROLOGY - [facility] (formerly known as [facility])    Patient seen and examined.  NAD    MEDICATIONS  (STANDING):  aspirin  chewable 81 milliGRAM(s) Oral daily  atorvastatin 10 milliGRAM(s) Oral at bedtime  cilostazol 100 milliGRAM(s) Oral two times a day  gabapentin 100 milliGRAM(s) Oral daily  heparin  Injectable 5000 Unit(s) SubCutaneous every 8 hours  magnesium oxide 400 milliGRAM(s) Oral daily  metoprolol succinate ER 25 milliGRAM(s) Oral daily  sodium chloride 0.9%. 1000 milliLiter(s) (50 mL/Hr) IV Continuous <Continuous>    VITALS:  T(C): , Max: 36.7 (04-20-19 @ 13:25)  T(F): , Max: 98 (04-20-19 @ 13:25)  HR: 70 (04-21-19 @ 05:58)  BP: 123/55 (04-21-19 @ 05:58)  RR: 18 (04-21-19 @ 05:58)  SpO2: 96% (04-21-19 @ 05:58)    PHYSICAL EXAM:  Constitutional: NAD  Respiratory: CTA B/L  Cardiovascular: S1 and S2, RRR  Gastrointestinal: + BS, soft, NT, ND  Extremities: no peripheral edema  Neurological: forgetful  : no lorenzana    LABS:                        9.2    4.93  )-----------( 268      ( 20 Apr 2019 06:14 )             28.6     04-21    138  |  103  |  19  ----------------------------<  79  4.9   |  22  |  1.70<H>    Ca    9.3      21 Apr 2019 05:30  Phos  3.0     04-20  Mg     1.7     04-21    TPro  6.5  /  Alb  3.2<L>  /  TBili  0.7  /  DBili  x   /  AST  16  /  ALT  9   /  AlkPhos  79  04-20    Urine Studies:    Creatinine, Random Urine: 44.80 mg/dL (04-21 @ 08:30)  Protein, Random Urine: 6.9 mg/dL (04-21 @ 08:30)    ASSESSMENT  89F w/ DM, HTN, HLD, OA, PAD, hypomagnesemia, CKD3 p/w b/l foot pain L>R (4/16)  - CKD: stage 3 due to DM/HTN  - SVETA; due to RAAS, hypotension +/- contrast - azotemia rising  - HTN: BP acceptable  - PAD: b/l LE, painful, S/P CTA - no vascular intervention  - hypomagnesemia: declining but supplemented    RECOMMEND  - continue mag ox 400 mg po daily   - a/w ivf ns @ 50 mL/hr  - hold RAAS inhibitors   - dose meds for a GFR ~ 30-40 mL/min    Franchesca Zee NP  [facility] ([facility], )  [phone]

## 2019-04-21 NOTE — PROGRESS NOTE ADULT - SUBJECTIVE AND OBJECTIVE BOX
Patient is a 89y old  Female who presents with a chief complaint of b/l foot pain (21 Apr 2019 12:54)      SUBJECTIVE / OVERNIGHT EVENTS: no overnight events    ROS:  Resp: No cough no sputum production  CVS: No chest pain no palpitations no orthopnea  GI: no N/V/D  : no dysuria, no hematuria  Neuro: no weakness no paresthesias  Heme: No petechiae no easy bruising  Msk: No joint pain no swelling  Skin: No rash no itching        MEDICATIONS  (STANDING):  aspirin  chewable 81 milliGRAM(s) Oral daily  atorvastatin 10 milliGRAM(s) Oral at bedtime  cilostazol 100 milliGRAM(s) Oral two times a day  dextrose 5%. 1000 milliLiter(s) (50 mL/Hr) IV Continuous <Continuous>  dextrose 50% Injectable 12.5 Gram(s) IV Push once  dextrose 50% Injectable 25 Gram(s) IV Push once  dextrose 50% Injectable 25 Gram(s) IV Push once  gabapentin 100 milliGRAM(s) Oral daily  heparin  Injectable 5000 Unit(s) SubCutaneous every 8 hours  insulin detemir injectable (LEVEMIR) 5 Unit(s) SubCutaneous two times a day  insulin lispro (HumaLOG) corrective regimen sliding scale   SubCutaneous three times a day before meals  insulin lispro (HumaLOG) corrective regimen sliding scale   SubCutaneous at bedtime  magnesium oxide 400 milliGRAM(s) Oral daily  metoprolol succinate ER 25 milliGRAM(s) Oral daily  sodium chloride 0.9%. 1000 milliLiter(s) (50 mL/Hr) IV Continuous <Continuous>    MEDICATIONS  (PRN):  acetaminophen   Tablet .. 650 milliGRAM(s) Oral every 6 hours PRN Moderate Pain (4 - 6)  dextrose 40% Gel 15 Gram(s) Oral once PRN Blood Glucose LESS THAN 70 milliGRAM(s)/deciliter  glucagon  Injectable 1 milliGRAM(s) IntraMuscular once PRN Glucose LESS THAN 70 milligrams/deciliter        CAPILLARY BLOOD GLUCOSE      POCT Blood Glucose.: 179 mg/dL (21 Apr 2019 12:00)  POCT Blood Glucose.: 87 mg/dL (21 Apr 2019 08:37)  POCT Blood Glucose.: 134 mg/dL (20 Apr 2019 22:19)  POCT Blood Glucose.: 89 mg/dL (20 Apr 2019 17:16)    I&O's Summary      Vital Signs Last 24 Hrs  T(C): 36.7 (21 Apr 2019 14:44), Max: 36.7 (21 Apr 2019 14:44)  T(F): 98.1 (21 Apr 2019 14:44), Max: 98.1 (21 Apr 2019 14:44)  HR: 74 (21 Apr 2019 14:44) (70 - 74)  BP: 128/51 (21 Apr 2019 14:44) (123/55 - 138/56)  BP(mean): --  RR: 18 (21 Apr 2019 14:44) (18 - 18)  SpO2: 98% (21 Apr 2019 14:44) (96% - 100%)    PHYSICAL EXAM:  GENERAL: NAD, not in any apparent distress ,  HEAD:  Atraumatic, Normocephalic  NECK: Supple, No JVD, Normal thyroid  NERVOUS SYSTEM:  Alert & Oriented, No focal deficit   CHEST/LUNG: Good air entry bilateral with no  rales, rhonchi, wheezing, or rubs  HEART: Regular rate and rhythm; No murmurs, rubs, or gallops  ABDOMEN: Soft, Nontender, Nondistended; Bowel sounds present  EXTREMITIES:  no tenderness, warm     LABS:                        9.2    4.93  )-----------( 268      ( 20 Apr 2019 06:14 )             28.6     04-21    138  |  103  |  19  ----------------------------<  79  4.9   |  22  |  1.70<H>    Ca    9.3      21 Apr 2019 05:30  Phos  3.0     04-20  Mg     1.7     04-21    TPro  6.5  /  Alb  3.2<L>  /  TBili  0.7  /  DBili  x   /  AST  16  /  ALT  9   /  AlkPhos  79  04-20                All consultant(s) notes reviewed and care discussed with other providers        Contact Number, Dr Fernando [phone]

## 2019-04-21 NOTE — PROGRESS NOTE ADULT - SUBJECTIVE AND OBJECTIVE BOX
SUBJECTIVE:  Patient denies chest pain or shortness of breath.   Review of systems otherwise (-)    MEDICATIONS  (STANDING):  aspirin  chewable 81 milliGRAM(s) Oral daily  atorvastatin 10 milliGRAM(s) Oral at bedtime  cilostazol 100 milliGRAM(s) Oral two times a day  dextrose 5%. 1000 milliLiter(s) (50 mL/Hr) IV Continuous <Continuous>  dextrose 50% Injectable 12.5 Gram(s) IV Push once  dextrose 50% Injectable 25 Gram(s) IV Push once  dextrose 50% Injectable 25 Gram(s) IV Push once  gabapentin 100 milliGRAM(s) Oral daily  heparin  Injectable 5000 Unit(s) SubCutaneous every 8 hours  insulin detemir injectable (LEVEMIR) 5 Unit(s) SubCutaneous two times a day  insulin lispro (HumaLOG) corrective regimen sliding scale   SubCutaneous three times a day before meals  insulin lispro (HumaLOG) corrective regimen sliding scale   SubCutaneous at bedtime  magnesium oxide 400 milliGRAM(s) Oral daily  metoprolol succinate ER 25 milliGRAM(s) Oral daily  sodium chloride 0.9%. 1000 milliLiter(s) (50 mL/Hr) IV Continuous <Continuous>    LABS:                       9.2    4.93  )-----------( 268      ( 20 Apr 2019 06:14 )             28.6     138  |  103  |  19  ----------------------------<  79  4.9   |  22  |  1.70<H>    Ca    9.3      21 Apr 2019 05:30  Phos  3.0     04-20  Mg     1.7     04-21    TPro  6.5  /  Alb  3.2<L>  /  TBili  0.7  /  DBili  x   /  AST  16  /  ALT  9   /  AlkPhos  79  04-20    Creatinine Trend: 1.70<--, 1.51<--, 1.64<--, 1.52<--, 1.18<--, 1.11<--     PHYSICAL EXAM  Vital Signs Last 24 Hrs  T(C): 36.6 (21 Apr 2019 05:58), Max: 36.7 (20 Apr 2019 13:25)  T(F): 97.8 (21 Apr 2019 05:58), Max: 98 (20 Apr 2019 13:25)  HR: 70 (21 Apr 2019 05:58) (70 - 71)  BP: 123/55 (21 Apr 2019 05:58) (123/55 - 141/60)  RR: 18 (21 Apr 2019 05:58) (18 - 18)  SpO2: 96% (21 Apr 2019 05:58) (96% - 100%)    HEENT:  (-)icterus (-)pallor  CV: Normal S1 S2, No JVD, No murmurs  Resp:  Clear to auscultation B/L, normal effort  GI: (+) BS Soft, NT, ND  Lymph:  (-)Edema, (-)obvious lymphadenopathy  Skin: Warm to touch, Normal turgor  Psych: Appropriate mood and affect    ECG: SR, 1st degree AV block (noted on previous EKGs)    < from: Transthoracic Echocardiogram (10.31.18 @ 07:42) >  CONCLUSIONS:  1. Normal left ventricular internal dimensions and wall  thicknesses.  2. Normal left ventricular systolic function. No segmental  wall motion abnormalities.  3. Normal right ventricular size and function.  4. Normal tricuspid valve. Minimal tricuspid regurgitation.  *** Compared with echocardiogram of 10/19/2016, no  significant changes noted.    < end of copied text >    < from: Nuclear Stress Test-Pharmacologic (03.07.17 @ 10:30) >  IMPRESSIONS:  * No ECG evidence of ischemia  * Myocardial Perfusion SPECT results are abnormal.  * There is a medium sized, mild defect in anterior wall  that is partially reversible, suggestive of mild ischemia.  * The left ventricle was normal LV size.  * Post-stress resting myocardial perfusion gated SPECT  imaging was performed (LVEF > 70%;LVEDV = 47 ml.) and  showed normal wall motion.    *** Conclusions ***  There is SPECT evidence of mild ischemia during  Regadenoson  pharmacologic stress in the distribution of  LAD.    < end of copied text >      ASSESSMENT/PLAN:     89 y/o female with history of DM2, CKD 3, HTN, HLD, and OA who presents to the hospital with complaints of persistent b/l feet pain. Patient had JAMIE study done that showed that she had multilevel PAD of b/l legs. Cardiology consulted for PAD/HTN management.     -Not in clinical heart failure and no anginal symptoms  -Last TTE 10/31/18 noted above with normal LV systolic function  -Last Stress test in 2017 - noted to have mild anterior wall ischemia but was medically managed  -off ASA for frequent falls, continue BB/Statin   -Nephrology note appreciated   -vascular f/u noted, no further work up planned  -if no surgery planned, then no further cardiac work up recommended at this time

## 2019-04-21 NOTE — PROGRESS NOTE ADULT - SUBJECTIVE AND OBJECTIVE BOX
C Team Surgery Progress Note     S: Patient resting comfortably on morning rounds. Pain well-controlled currently. No new complaints. Stable      MEDICATIONS  (STANDING):  aspirin  chewable 81 milliGRAM(s) Oral daily  atorvastatin 10 milliGRAM(s) Oral at bedtime  cilostazol 100 milliGRAM(s) Oral two times a day  dextrose 5%. 1000 milliLiter(s) (50 mL/Hr) IV Continuous <Continuous>  dextrose 50% Injectable 12.5 Gram(s) IV Push once  dextrose 50% Injectable 25 Gram(s) IV Push once  dextrose 50% Injectable 25 Gram(s) IV Push once  gabapentin 100 milliGRAM(s) Oral daily  heparin  Injectable 5000 Unit(s) SubCutaneous every 8 hours  insulin detemir injectable (LEVEMIR) 5 Unit(s) SubCutaneous two times a day  insulin lispro (HumaLOG) corrective regimen sliding scale   SubCutaneous three times a day before meals  insulin lispro (HumaLOG) corrective regimen sliding scale   SubCutaneous at bedtime  magnesium oxide 400 milliGRAM(s) Oral daily  metoprolol succinate ER 25 milliGRAM(s) Oral daily    MEDICATIONS  (PRN):  acetaminophen   Tablet .. 650 milliGRAM(s) Oral every 6 hours PRN Moderate Pain (4 - 6)  dextrose 40% Gel 15 Gram(s) Oral once PRN Blood Glucose LESS THAN 70 milliGRAM(s)/deciliter  glucagon  Injectable 1 milliGRAM(s) IntraMuscular once PRN Glucose LESS THAN 70 milligrams/deciliter      Physical Exam:          General: NAD  Resp: Nonlabored    LE:   Skin Intact  motor/sensation intact  R PT signal  L PT signal   [facility]         LABS:                        9.2    4.93  )-----------( 268      ( 20 Apr 2019 06:14 )             28.6     04-21    138  |  103  |  19  ----------------------------<  79  4.9   |  22  |  1.70<H>    Ca    9.3      21 Apr 2019 05:30  Phos  3.0     04-20  Mg     1.7     04-21    TPro  6.5  /  Alb  3.2<L>  /  TBili  0.7  /  DBili  x   /  AST  16  /  ALT  9   /  AlkPhos  79  04-20

## 2019-04-22 LAB
ANION GAP SERPL CALC-SCNC: 11 MMO/L — SIGNIFICANT CHANGE UP (ref 7–14)
BUN SERPL-MCNC: 23 MG/DL — SIGNIFICANT CHANGE UP (ref 7–23)
CALCIUM SERPL-MCNC: 9 MG/DL — SIGNIFICANT CHANGE UP (ref 8.4–10.5)
CHLORIDE SERPL-SCNC: 104 MMOL/L — SIGNIFICANT CHANGE UP (ref 98–107)
CO2 SERPL-SCNC: 22 MMOL/L — SIGNIFICANT CHANGE UP (ref 22–31)
CREAT SERPL-MCNC: 1.7 MG/DL — HIGH (ref 0.5–1.3)
GLUCOSE BLDC GLUCOMTR-MCNC: 100 MG/DL — HIGH (ref 70–99)
GLUCOSE BLDC GLUCOMTR-MCNC: 103 MG/DL — HIGH (ref 70–99)
GLUCOSE BLDC GLUCOMTR-MCNC: 134 MG/DL — HIGH (ref 70–99)
GLUCOSE BLDC GLUCOMTR-MCNC: 179 MG/DL — HIGH (ref 70–99)
GLUCOSE SERPL-MCNC: 82 MG/DL — SIGNIFICANT CHANGE UP (ref 70–99)
MAGNESIUM SERPL-MCNC: 1.7 MG/DL — SIGNIFICANT CHANGE UP (ref 1.6–2.6)
POTASSIUM SERPL-MCNC: 4.8 MMOL/L — SIGNIFICANT CHANGE UP (ref 3.5–5.3)
POTASSIUM SERPL-SCNC: 4.8 MMOL/L — SIGNIFICANT CHANGE UP (ref 3.5–5.3)
SODIUM SERPL-SCNC: 137 MMOL/L — SIGNIFICANT CHANGE UP (ref 135–145)

## 2019-04-22 RX ADMIN — CILOSTAZOL 100 MILLIGRAM(S): 100 TABLET ORAL at 05:57

## 2019-04-22 RX ADMIN — MAGNESIUM OXIDE 400 MG ORAL TABLET 400 MILLIGRAM(S): 241.3 TABLET ORAL at 13:11

## 2019-04-22 RX ADMIN — ATORVASTATIN CALCIUM 10 MILLIGRAM(S): 80 TABLET, FILM COATED ORAL at 23:08

## 2019-04-22 RX ADMIN — HEPARIN SODIUM 5000 UNIT(S): 5000 INJECTION INTRAVENOUS; SUBCUTANEOUS at 05:57

## 2019-04-22 RX ADMIN — Medication 5 UNIT(S): at 23:08

## 2019-04-22 RX ADMIN — Medication 1: at 13:10

## 2019-04-22 RX ADMIN — GABAPENTIN 100 MILLIGRAM(S): 400 CAPSULE ORAL at 13:10

## 2019-04-22 RX ADMIN — CILOSTAZOL 100 MILLIGRAM(S): 100 TABLET ORAL at 17:33

## 2019-04-22 RX ADMIN — HEPARIN SODIUM 5000 UNIT(S): 5000 INJECTION INTRAVENOUS; SUBCUTANEOUS at 23:08

## 2019-04-22 RX ADMIN — HEPARIN SODIUM 5000 UNIT(S): 5000 INJECTION INTRAVENOUS; SUBCUTANEOUS at 13:11

## 2019-04-22 RX ADMIN — Medication 81 MILLIGRAM(S): at 13:11

## 2019-04-22 RX ADMIN — Medication 5 UNIT(S): at 09:43

## 2019-04-22 NOTE — PROGRESS NOTE ADULT - SUBJECTIVE AND OBJECTIVE BOX
Patient is a 89y old  Female who presents with a chief complaint of b/l foot pain (22 Apr 2019 12:32)      SUBJECTIVE / OVERNIGHT EVENTS: no overnight events    ROS:  Resp: No cough no sputum production  CVS: No chest pain no palpitations no orthopnea  GI: no N/V/D  : no dysuria, no hematuria  Neuro: no weakness no paresthesias  Heme: No petechiae no easy bruising  Msk: No joint pain no swelling  Skin: No rash no itching        MEDICATIONS  (STANDING):  aspirin  chewable 81 milliGRAM(s) Oral daily  atorvastatin 10 milliGRAM(s) Oral at bedtime  cilostazol 100 milliGRAM(s) Oral two times a day  dextrose 5%. 1000 milliLiter(s) (50 mL/Hr) IV Continuous <Continuous>  dextrose 50% Injectable 12.5 Gram(s) IV Push once  dextrose 50% Injectable 25 Gram(s) IV Push once  dextrose 50% Injectable 25 Gram(s) IV Push once  gabapentin 100 milliGRAM(s) Oral daily  heparin  Injectable 5000 Unit(s) SubCutaneous every 8 hours  insulin detemir injectable (LEVEMIR) 5 Unit(s) SubCutaneous two times a day  insulin lispro (HumaLOG) corrective regimen sliding scale   SubCutaneous three times a day before meals  insulin lispro (HumaLOG) corrective regimen sliding scale   SubCutaneous at bedtime  magnesium oxide 400 milliGRAM(s) Oral daily  metoprolol succinate ER 25 milliGRAM(s) Oral daily  sodium chloride 0.9%. 1000 milliLiter(s) (50 mL/Hr) IV Continuous <Continuous>    MEDICATIONS  (PRN):  acetaminophen   Tablet .. 650 milliGRAM(s) Oral every 6 hours PRN Moderate Pain (4 - 6)  dextrose 40% Gel 15 Gram(s) Oral once PRN Blood Glucose LESS THAN 70 milliGRAM(s)/deciliter  glucagon  Injectable 1 milliGRAM(s) IntraMuscular once PRN Glucose LESS THAN 70 milligrams/deciliter        CAPILLARY BLOOD GLUCOSE      POCT Blood Glucose.: 179 mg/dL (22 Apr 2019 12:18)  POCT Blood Glucose.: 103 mg/dL (22 Apr 2019 08:34)  POCT Blood Glucose.: 134 mg/dL (21 Apr 2019 22:28)  POCT Blood Glucose.: 99 mg/dL (21 Apr 2019 17:13)    I&O's Summary    21 Apr 2019 07:01 - 22 Apr 2019 07:00  --------------------------------------------------------  IN: 0 mL / OUT: 0 mL / NET: 0 mL        Vital Signs Last 24 Hrs  T(C): 36.5 (22 Apr 2019 13:42), Max: 36.7 (21 Apr 2019 20:57)  T(F): 97.7 (22 Apr 2019 13:42), Max: 98 (21 Apr 2019 20:57)  HR: 76 (22 Apr 2019 13:42) (75 - 76)  BP: 121/53 (22 Apr 2019 13:42) (105/50 - 152/58)  BP(mean): --  RR: 18 (22 Apr 2019 13:42) (18 - 18)  SpO2: 98% (22 Apr 2019 13:42) (97% - 98%)    PHYSICAL EXAM:  GENERAL: NAD, not in any apparent distress ,  HEAD:  Atraumatic, Normocephalic  NECK: Supple, No JVD, Normal thyroid  NERVOUS SYSTEM:  Alert & Oriented, No focal deficit   CHEST/LUNG: Good air entry bilateral with no  rales, rhonchi, wheezing, or rubs  HEART: Regular rate and rhythm; No murmurs, rubs, or gallops  ABDOMEN: Soft, Nontender, Nondistended; Bowel sounds present  EXTREMITIES:  no tenderness, warm     LABS:    04-22    137  |  104  |  23  ----------------------------<  82  4.8   |  22  |  1.70<H>    Ca    9.0      22 Apr 2019 06:00  Mg     1.7     04-22                  All consultant(s) notes reviewed and care discussed with other providers        Contact Number, Dr Fernando [phone]

## 2019-04-22 NOTE — PROGRESS NOTE ADULT - ATTENDING COMMENTS
Patient seen and examined.  Agree with above.   No further inpatient cardiac workup needed at this time.     Palak Curry MD

## 2019-04-22 NOTE — PROGRESS NOTE ADULT - SUBJECTIVE AND OBJECTIVE BOX
Patient seen and examined  No complaints     REVIEW OF SYSTEMS:  As per HPI, otherwise 8 full 10 ROS were unremarkable    MEDICATIONS  (STANDING):  aspirin  chewable 81 milliGRAM(s) Oral daily  atorvastatin 10 milliGRAM(s) Oral at bedtime  cilostazol 100 milliGRAM(s) Oral two times a day  dextrose 5%. 1000 milliLiter(s) (50 mL/Hr) IV Continuous <Continuous>  dextrose 50% Injectable 12.5 Gram(s) IV Push once  dextrose 50% Injectable 25 Gram(s) IV Push once  dextrose 50% Injectable 25 Gram(s) IV Push once  gabapentin 100 milliGRAM(s) Oral daily  heparin  Injectable 5000 Unit(s) SubCutaneous every 8 hours  insulin detemir injectable (LEVEMIR) 5 Unit(s) SubCutaneous two times a day  insulin lispro (HumaLOG) corrective regimen sliding scale   SubCutaneous three times a day before meals  insulin lispro (HumaLOG) corrective regimen sliding scale   SubCutaneous at bedtime  magnesium oxide 400 milliGRAM(s) Oral daily  metoprolol succinate ER 25 milliGRAM(s) Oral daily  sodium chloride 0.9%. 1000 milliLiter(s) (50 mL/Hr) IV Continuous <Continuous>      VITAL:  T(C): , Max: 36.7 (04-21-19 @ 14:44)  T(F): , Max: 98.1 (04-21-19 @ 14:44)  HR: 76 (04-22-19 @ 05:53)  BP: 105/50 (04-22-19 @ 05:53)  BP(mean): --  RR: 18 (04-22-19 @ 05:53)  SpO2: 97% (04-22-19 @ 05:53)  Wt(kg): --    I and O's:    04-21 @ 07:01  -  04-22 @ 07:00  --------------------------------------------------------  IN: 0 mL / OUT: 0 mL / NET: 0 mL          PHYSICAL EXAM:    Constitutional: NAD  HEENT: PERRLA, EOMI,  MMM  Neck: No LAD, No JVD  Respiratory: CTAB  Cardiovascular: S1 and S2  Gastrointestinal: BS+, soft, NT/ND  Extremities: No peripheral edema  Neurological: A/O x 3, no focal deficits  Psychiatric: Normal mood, normal affect  : No Mccray      LABS:    04-22    137  |  104  |  23  ----------------------------<  82  4.8   |  22  |  1.70<H>    Ca    9.0      22 Apr 2019 06:00  Mg     1.7     04-22        Urine Studies:    Creatinine, Random Urine: 44.80 mg/dL (04-21 @ 08:30)  Protein, Random Urine: 6.9 mg/dL (04-21 @ 08:30)        ASSESSMENT  89F w/ DM, HTN, HLD, OA, PAD, hypomagnesemia, CKD3 p/w b/l foot pain L>R (4/16)    - CKD: stage 3 due to DM/HTN  - SVETA; due to RAAS, hypotension +/- contrast - azotemia is stable- ? plateau  - HTN: BP acceptable  - PAD: b/l LE, painful, S/P CTA - no vascular intervention  - Hypomagnesemia- level is pending   RECOMMEND  - continue mag ox 400 mg po daily   - Change Glucerna to Nepro given her high K  - c/w NS at 50 cc/hr until expiration  - Follow Mg level   - hold RAAS inhibitors   - dose meds for a GFR ~ 30-40 mL/min  - Would watch her another day in the hospiatl until the creatinine starts to go down     d/w Medicine NP    Kamilah Dan MD  [facility] ([facility], )  [phone] Patient seen and examined  No complaints     REVIEW OF SYSTEMS:  As per HPI, otherwise 8 full 10 ROS were unremarkable    MEDICATIONS  (STANDING):  aspirin  chewable 81 milliGRAM(s) Oral daily  atorvastatin 10 milliGRAM(s) Oral at bedtime  cilostazol 100 milliGRAM(s) Oral two times a day  dextrose 5%. 1000 milliLiter(s) (50 mL/Hr) IV Continuous <Continuous>  dextrose 50% Injectable 12.5 Gram(s) IV Push once  dextrose 50% Injectable 25 Gram(s) IV Push once  dextrose 50% Injectable 25 Gram(s) IV Push once  gabapentin 100 milliGRAM(s) Oral daily  heparin  Injectable 5000 Unit(s) SubCutaneous every 8 hours  insulin detemir injectable (LEVEMIR) 5 Unit(s) SubCutaneous two times a day  insulin lispro (HumaLOG) corrective regimen sliding scale   SubCutaneous three times a day before meals  insulin lispro (HumaLOG) corrective regimen sliding scale   SubCutaneous at bedtime  magnesium oxide 400 milliGRAM(s) Oral daily  metoprolol succinate ER 25 milliGRAM(s) Oral daily  sodium chloride 0.9%. 1000 milliLiter(s) (50 mL/Hr) IV Continuous <Continuous>      VITAL:  T(C): , Max: 36.7 (04-21-19 @ 14:44)  T(F): , Max: 98.1 (04-21-19 @ 14:44)  HR: 76 (04-22-19 @ 05:53)  BP: 105/50 (04-22-19 @ 05:53)  BP(mean): --  RR: 18 (04-22-19 @ 05:53)  SpO2: 97% (04-22-19 @ 05:53)  Wt(kg): --    I and O's:    04-21 @ 07:01  -  04-22 @ 07:00  --------------------------------------------------------  IN: 0 mL / OUT: 0 mL / NET: 0 mL          PHYSICAL EXAM:    Constitutional: NAD  HEENT: PERRLA, EOMI,  MMM  Neck: No LAD, No JVD  Respiratory: CTAB  Cardiovascular: S1 and S2  Gastrointestinal: BS+, soft, NT/ND  Extremities: No peripheral edema  Neurological: A/O x 3, no focal deficits  Psychiatric: Normal mood, normal affect  : No Mccray      LABS:    04-22    137  |  104  |  23  ----------------------------<  82  4.8   |  22  |  1.70<H>    Ca    9.0      22 Apr 2019 06:00  Mg     1.7     04-22        Urine Studies:    Creatinine, Random Urine: 44.80 mg/dL (04-21 @ 08:30)  Protein, Random Urine: 6.9 mg/dL (04-21 @ 08:30)        ASSESSMENT  89F w/ DM, HTN, HLD, OA, PAD, hypomagnesemia, CKD3 p/w b/l foot pain L>R (4/16)    - CKD: stage 3 due to DM/HTN  - SVETA; due to RAAS, hypotension +/- contrast - azotemia is stable- ? plateau  - HTN: BP acceptable  - PAD: b/l LE, painful, S/P CTA - no vascular intervention  - Hypomagnesemia- level is pending     RECOMMEND  - continue mag ox 400 mg po daily   - Change Glucerna to Nepro given her high K  - Defer further IVF  - Post void bladder scan  - Strict I/O if possible   - Follow Mg level   - hold RAAS inhibitors   - dose meds for a GFR ~ 30-40 mL/min  - Would watch her another day in the hospital until the creatinine starts to go down     d/w Medicine NP    Kamilah Dan MD  [facility] ([facility], )  [phone]

## 2019-04-22 NOTE — PROGRESS NOTE ADULT - SUBJECTIVE AND OBJECTIVE BOX
SUBJECTIVE:  Denies b/l foot pain. Denies chest pain or shortness of breath.    Review of systems otherwise (-)      MEDICATIONS  (STANDING):  aspirin  chewable 81 milliGRAM(s) Oral daily  atorvastatin 10 milliGRAM(s) Oral at bedtime  cilostazol 100 milliGRAM(s) Oral two times a day  dextrose 5%. 1000 milliLiter(s) (50 mL/Hr) IV Continuous <Continuous>  dextrose 50% Injectable 12.5 Gram(s) IV Push once  dextrose 50% Injectable 25 Gram(s) IV Push once  dextrose 50% Injectable 25 Gram(s) IV Push once  gabapentin 100 milliGRAM(s) Oral daily  heparin  Injectable 5000 Unit(s) SubCutaneous every 8 hours  insulin detemir injectable (LEVEMIR) 5 Unit(s) SubCutaneous two times a day  insulin lispro (HumaLOG) corrective regimen sliding scale   SubCutaneous three times a day before meals  insulin lispro (HumaLOG) corrective regimen sliding scale   SubCutaneous at bedtime  magnesium oxide 400 milliGRAM(s) Oral daily  metoprolol succinate ER 25 milliGRAM(s) Oral daily  sodium chloride 0.9%. 1000 milliLiter(s) (50 mL/Hr) IV Continuous <Continuous>    MEDICATIONS  (PRN):  acetaminophen   Tablet .. 650 milliGRAM(s) Oral every 6 hours PRN Moderate Pain (4 - 6)  dextrose 40% Gel 15 Gram(s) Oral once PRN Blood Glucose LESS THAN 70 milliGRAM(s)/deciliter  glucagon  Injectable 1 milliGRAM(s) IntraMuscular once PRN Glucose LESS THAN 70 milligrams/deciliter      LABS:        Hemoglobin: 9.2 g/dL (04-20 @ 06:14)  Hemoglobin: 9.3 g/dL (04-19 @ 05:00)  Hemoglobin: 9.5 g/dL (04-18 @ 05:54)    04-22    137  |  104  |  23  ----------------------------<  82  4.8   |  22  |  1.70<H>    Ca    9.0      22 Apr 2019 06:00  Mg     1.7     04-22      Creatinine Trend: 1.70<--, 1.70<--, 1.51<--, 1.64<--, 1.52<--, 1.18<--           PHYSICAL EXAM  Vital Signs Last 24 Hrs  T(C): 36.4 (22 Apr 2019 05:53), Max: 36.7 (21 Apr 2019 14:44)  T(F): 97.5 (22 Apr 2019 05:53), Max: 98.1 (21 Apr 2019 14:44)  HR: 76 (22 Apr 2019 05:53) (74 - 76)  BP: 105/50 (22 Apr 2019 05:53) (105/50 - 152/58)  BP(mean): --  RR: 18 (22 Apr 2019 05:53) (18 - 18)  SpO2: 97% (22 Apr 2019 05:53) (97% - 98%)      HEENT:  (-)icterus (-)pallor  CV: Normal S1 S2, No JVD, No murmurs  Resp:  Clear to auscultation B/L, normal effort  GI: (+) BS Soft, NT, ND  Lymph:  (-)Edema, (-)obvious lymphadenopathy  Skin: Warm to touch, Normal turgor  Psych: Appropriate mood and affect    ECG: SR, 1st degree AV block (noted on previous EKGs)    < from: Transthoracic Echocardiogram (10.31.18 @ 07:42) >  CONCLUSIONS:  1. Normal left ventricular internal dimensions and wall  thicknesses.  2. Normal left ventricular systolic function. No segmental  wall motion abnormalities.  3. Normal right ventricular size and function.  4. Normal tricuspid valve. Minimal tricuspid regurgitation.  *** Compared with echocardiogram of 10/19/2016, no  significant changes noted.    < end of copied text >    < from: Nuclear Stress Test-Pharmacologic (03.07.17 @ 10:30) >  IMPRESSIONS:  * No ECG evidence of ischemia  * Myocardial Perfusion SPECT results are abnormal.  * There is a medium sized, mild defect in anterior wall  that is partially reversible, suggestive of mild ischemia.  * The left ventricle was normal LV size.  * Post-stress resting myocardial perfusion gated SPECT  imaging was performed (LVEF > 70%;LVEDV = 47 ml.) and  showed normal wall motion.    *** Conclusions ***  There is SPECT evidence of mild ischemia during  Regadenoson  pharmacologic stress in the distribution of  LAD.    < end of copied text >      ASSESSMENT/PLAN:     89 y/o female with history of DM2, CKD 3, HTN, HLD, and OA who presents to the hospital with complaints of persistent b/l feet pain. Patient had JAMIE study done that showed that she had multilevel PAD of b/l legs. Cardiology consulted for PAD/HTN management.     -Not in clinical heart failure and no anginal symptoms  -Last TTE 10/31/18 noted above with normal LV systolic function  -Last Stress test in 2017 - noted to have mild anterior wall ischemia but was medically managed  -Pt was off ASA for frequent falls however restarted per vascular due to PAD  -Continue BB/Statin   -Nephrology note appreciated - trend cr  -vascular f/u noted, no further work up planned  -if no surgery planned, then no further cardiac work up recommended at this time

## 2019-04-23 ENCOUNTER — TRANSCRIPTION ENCOUNTER (OUTPATIENT)
Age: 84
End: 2019-04-23

## 2019-04-23 VITALS
DIASTOLIC BLOOD PRESSURE: 54 MMHG | SYSTOLIC BLOOD PRESSURE: 132 MMHG | TEMPERATURE: 98 F | OXYGEN SATURATION: 100 % | HEART RATE: 82 BPM | RESPIRATION RATE: 18 BRPM

## 2019-04-23 LAB
ANION GAP SERPL CALC-SCNC: 10 MMO/L — SIGNIFICANT CHANGE UP (ref 7–14)
ANION GAP SERPL CALC-SCNC: 10 MMO/L — SIGNIFICANT CHANGE UP (ref 7–14)
BUN SERPL-MCNC: 20 MG/DL — SIGNIFICANT CHANGE UP (ref 7–23)
BUN SERPL-MCNC: 20 MG/DL — SIGNIFICANT CHANGE UP (ref 7–23)
CALCIUM SERPL-MCNC: 8.9 MG/DL — SIGNIFICANT CHANGE UP (ref 8.4–10.5)
CALCIUM SERPL-MCNC: 8.9 MG/DL — SIGNIFICANT CHANGE UP (ref 8.4–10.5)
CHLORIDE SERPL-SCNC: 105 MMOL/L — SIGNIFICANT CHANGE UP (ref 98–107)
CHLORIDE SERPL-SCNC: 105 MMOL/L — SIGNIFICANT CHANGE UP (ref 98–107)
CO2 SERPL-SCNC: 23 MMOL/L — SIGNIFICANT CHANGE UP (ref 22–31)
CO2 SERPL-SCNC: 23 MMOL/L — SIGNIFICANT CHANGE UP (ref 22–31)
CREAT SERPL-MCNC: 1.51 MG/DL — HIGH (ref 0.5–1.3)
CREAT SERPL-MCNC: 1.51 MG/DL — HIGH (ref 0.5–1.3)
GLUCOSE BLDC GLUCOMTR-MCNC: 101 MG/DL — HIGH (ref 70–99)
GLUCOSE BLDC GLUCOMTR-MCNC: 112 MG/DL — HIGH (ref 70–99)
GLUCOSE SERPL-MCNC: 94 MG/DL — SIGNIFICANT CHANGE UP (ref 70–99)
GLUCOSE SERPL-MCNC: 94 MG/DL — SIGNIFICANT CHANGE UP (ref 70–99)
MAGNESIUM SERPL-MCNC: 1.6 MG/DL — SIGNIFICANT CHANGE UP (ref 1.6–2.6)
POTASSIUM SERPL-MCNC: 4.9 MMOL/L — SIGNIFICANT CHANGE UP (ref 3.5–5.3)
POTASSIUM SERPL-MCNC: 4.9 MMOL/L — SIGNIFICANT CHANGE UP (ref 3.5–5.3)
POTASSIUM SERPL-SCNC: 4.9 MMOL/L — SIGNIFICANT CHANGE UP (ref 3.5–5.3)
POTASSIUM SERPL-SCNC: 4.9 MMOL/L — SIGNIFICANT CHANGE UP (ref 3.5–5.3)
SODIUM SERPL-SCNC: 138 MMOL/L — SIGNIFICANT CHANGE UP (ref 135–145)
SODIUM SERPL-SCNC: 138 MMOL/L — SIGNIFICANT CHANGE UP (ref 135–145)

## 2019-04-23 RX ADMIN — Medication 5 UNIT(S): at 09:03

## 2019-04-23 RX ADMIN — HEPARIN SODIUM 5000 UNIT(S): 5000 INJECTION INTRAVENOUS; SUBCUTANEOUS at 12:48

## 2019-04-23 RX ADMIN — Medication 650 MILLIGRAM(S): at 12:47

## 2019-04-23 RX ADMIN — Medication 25 MILLIGRAM(S): at 06:05

## 2019-04-23 RX ADMIN — MAGNESIUM OXIDE 400 MG ORAL TABLET 400 MILLIGRAM(S): 241.3 TABLET ORAL at 12:47

## 2019-04-23 RX ADMIN — CILOSTAZOL 100 MILLIGRAM(S): 100 TABLET ORAL at 06:05

## 2019-04-23 RX ADMIN — GABAPENTIN 100 MILLIGRAM(S): 400 CAPSULE ORAL at 12:47

## 2019-04-23 RX ADMIN — Medication 81 MILLIGRAM(S): at 12:47

## 2019-04-23 RX ADMIN — HEPARIN SODIUM 5000 UNIT(S): 5000 INJECTION INTRAVENOUS; SUBCUTANEOUS at 06:05

## 2019-04-23 NOTE — DISCHARGE NOTE NURSING/CASE MANAGEMENT/SOCIAL WORK - NSDCDPATPORTLINK_GEN_ALL_CORE
You can access the [facility] Patient Portal, offered by [facility], by registering with the following website: [email or web address]

## 2019-04-23 NOTE — PROGRESS NOTE ADULT - PROVIDER SPECIALTY LIST ADULT
Cardiology
Internal Medicine
Nephrology
Vascular Surgery
Cardiology
Internal Medicine
Internal Medicine

## 2019-04-23 NOTE — PROGRESS NOTE ADULT - REASON FOR ADMISSION
b/l foot pain

## 2019-04-23 NOTE — PROGRESS NOTE ADULT - SUBJECTIVE AND OBJECTIVE BOX
SUBJECTIVE:  Denies chest pain or shortness of breath.    Review of systems otherwise (-)      MEDICATIONS  (STANDING):  aspirin  chewable 81 milliGRAM(s) Oral daily  atorvastatin 10 milliGRAM(s) Oral at bedtime  cilostazol 100 milliGRAM(s) Oral two times a day  dextrose 5%. 1000 milliLiter(s) (50 mL/Hr) IV Continuous <Continuous>  dextrose 50% Injectable 12.5 Gram(s) IV Push once  dextrose 50% Injectable 25 Gram(s) IV Push once  dextrose 50% Injectable 25 Gram(s) IV Push once  gabapentin 100 milliGRAM(s) Oral daily  heparin  Injectable 5000 Unit(s) SubCutaneous every 8 hours  insulin detemir injectable (LEVEMIR) 5 Unit(s) SubCutaneous two times a day  insulin lispro (HumaLOG) corrective regimen sliding scale   SubCutaneous three times a day before meals  insulin lispro (HumaLOG) corrective regimen sliding scale   SubCutaneous at bedtime  magnesium oxide 400 milliGRAM(s) Oral daily  metoprolol succinate ER 25 milliGRAM(s) Oral daily  sodium chloride 0.9%. 1000 milliLiter(s) (50 mL/Hr) IV Continuous <Continuous>    MEDICATIONS  (PRN):  acetaminophen   Tablet .. 650 milliGRAM(s) Oral every 6 hours PRN Moderate Pain (4 - 6)  dextrose 40% Gel 15 Gram(s) Oral once PRN Blood Glucose LESS THAN 70 milliGRAM(s)/deciliter  glucagon  Injectable 1 milliGRAM(s) IntraMuscular once PRN Glucose LESS THAN 70 milligrams/deciliter      LABS:        Hemoglobin: 9.2 g/dL (04-20 @ 06:14)  Hemoglobin: 9.3 g/dL (04-19 @ 05:00)    04-23    138  |  105  |  20  ----------------------------<  94  4.9   |  23  |  1.51<H>    Ca    8.9      23 Apr 2019 07:38  Mg     1.6     04-23      Creatinine Trend: 1.51<--, 1.70<--, 1.70<--, 1.51<--, 1.64<--, 1.52<--           PHYSICAL EXAM  Vital Signs Last 24 Hrs  T(C): 36.6 (23 Apr 2019 06:04), Max: 36.6 (22 Apr 2019 22:01)  T(F): 97.8 (23 Apr 2019 06:04), Max: 97.8 (22 Apr 2019 22:01)  HR: 81 (23 Apr 2019 06:04) (76 - 88)  BP: 146/58 (23 Apr 2019 06:04) (121/53 - 146/58)  BP(mean): --  RR: 18 (23 Apr 2019 06:04) (18 - 18)  SpO2: 98% (23 Apr 2019 06:04) (98% - 98%)      HEENT:  (-)icterus (-)pallor  CV: Normal S1 S2, No JVD, No murmurs  Resp:  Clear to auscultation B/L, normal effort  GI: (+) BS Soft, NT, ND  Lymph:  (-)Edema, (-)obvious lymphadenopathy  Skin: Warm to touch, Normal turgor  Psych: Appropriate mood and affect    ECG: SR, 1st degree AV block (noted on previous EKGs)    < from: Transthoracic Echocardiogram (10.31.18 @ 07:42) >  CONCLUSIONS:  1. Normal left ventricular internal dimensions and wall  thicknesses.  2. Normal left ventricular systolic function. No segmental  wall motion abnormalities.  3. Normal right ventricular size and function.  4. Normal tricuspid valve. Minimal tricuspid regurgitation.  *** Compared with echocardiogram of 10/19/2016, no  significant changes noted.    < end of copied text >    < from: Nuclear Stress Test-Pharmacologic (03.07.17 @ 10:30) >  IMPRESSIONS:  * No ECG evidence of ischemia  * Myocardial Perfusion SPECT results are abnormal.  * There is a medium sized, mild defect in anterior wall  that is partially reversible, suggestive of mild ischemia.  * The left ventricle was normal LV size.  * Post-stress resting myocardial perfusion gated SPECT  imaging was performed (LVEF > 70%;LVEDV = 47 ml.) and  showed normal wall motion.    *** Conclusions ***  There is SPECT evidence of mild ischemia during  Regadenoson  pharmacologic stress in the distribution of  LAD.    < end of copied text >      ASSESSMENT/PLAN:     89 y/o female with history of DM2, CKD 3, HTN, HLD, and OA who presents to the hospital with complaints of persistent b/l feet pain. Patient had JAMIE study done that showed that she had multilevel PAD of b/l legs. Cardiology consulted for PAD/HTN management.     -Not in clinical heart failure and no anginal symptoms  -Last TTE 10/31/18 noted above with normal LV systolic function  -Last Stress test in 2017 - noted to have mild anterior wall ischemia but was medically managed  -Pt was off ASA for frequent falls however restarted per vascular due to PAD  -Continue BB/Statin   -Nephrology note appreciated - SVETA improving   -Vascular f/u noted, no further work up planned  -No further inpatient cardiac workup needed at this time  -D/C planning per primary team

## 2019-04-23 NOTE — PROGRESS NOTE ADULT - ASSESSMENT
89W with history as above who presents to the hospital with complaints of b/l feet pain found to have b/l multilevel PAD.     Problem/Plan - 1:  ·  Problem: Pain in both feet.  Plan: - Vascular consult noted . Possible  Angiogram with angioplasty .  - will place patient on cilostazol (no history of heart failure) and monitor  - Tylenol prn for pain, will try to limit opioids in this elderly, frail woman  - c/w gabapentin.     Problem/Plan - 2:  ·  Problem: Peripheral arterial disease.  Plan: - Possible  Angiogram with angioplasty .Will place on cilostazol  - Patient states that she used to be on a baby aspirin but was taken off a while ago due to her recurrent falls, will therefore monitor off aspirin for now       Problem/Plan - 3:  ·  Problem: Type 2 diabetes mellitus with hyperglycemia, with long-term current use of insulin.  Plan: - Sugars in good control. On levemir 10U BID, states that her appetite has been poor, will therefore place on levemir 5U BID for now  - Seen by nutritionist on her last hospitalization, recommended to have glucerna shakes BID, will therefore order for that.      Problem/Plan - 4:  ·  Problem: Hyperlipidemia, unspecified hyperlipidemia type.  Plan: - Will place on atorvastatin 10mg qhs but might require uptitrating given her PAD  - Will check lipid profile in AM.      Problem/Plan - 5:  ·  Problem: Essential hypertension.  Plan: - lisinopril as therapeutic interchange for home benazepril  - c/w home metoprolol.      Problem/Plan - 6:  Problem: Stage III chronic kidney disease. Plan: - Stable. Renal helping.      Problem/Plan - 7:  ·  Problem: Need for prophylactic measure.  Plan: - HSQ for DVT ppx  - Fall precautions.
89W with history as above who presents to the hospital with complaints of b/l feet pain found to have b/l multilevel PAD.     Problem/Plan - 1:  ·  Problem: Pain in both feet.  Plan: - Vascular consult noted . Possible  Angiogram with angioplasty .  - will place patient on cilostazol (no history of heart failure) and monitor  - Tylenol prn for pain, will try to limit opioids in this elderly, frail woman  - c/w gabapentin.     Problem/Plan - 2:  ·  Problem: Peripheral arterial disease.  Plan: - CTA noted will start ASA 81 po qd       Problem/Plan - 3:  ·  Problem: Type 2 diabetes mellitus with hyperglycemia, with long-term current use of insulin.    continue finger sticks with short acting insulin sliding scale       Problem/Plan - 4:  ·  Problem: Hyperlipidemia, unspecified hyperlipidemia type.  Plan: - Will place on atorvastatin 10mg qhs but might require up titrating given her PAD     Problem/Plan - 5:  ·  Problem: Essential hypertension.  Plan: - lisinopril as therapeutic interchange for home benazepril  - c/w home metoprolol.      Problem/Plan - 6:  SVETA likely hemodynamic vs MARIS  will continue to monitor       Problem/Plan - 7:  ·  Problem: Need for prophylactic measure.  Plan: - HSQ for DVT ppx  - Fall precautions.     patient adamantly refusing Subacute Rehab  will discharge home once SVETA resolved
89yo female with chronic rest pain in feet    Neuro: Pain control  Resp: IS  GI: Regular diet, bowel reg  MSK: WBAT, PT/OT  Heme: DVT PPX: hep subq  No acute vascular surgery intervention  C/w hep, asa, pletal  Can FU outpatient with Dr. Vasquez as needed  Please page [phone] with further questions/concerns
89W with history as above who presents to the hospital with complaints of b/l feet pain found to have b/l multilevel PAD.     Problem/Plan - 1:  ·  Problem: Pain in both feet.  Plan: - Vascular consult noted  d/w vascular attending  no intervention for now as patient and son's wishes   he is in agreement with the plan for conservative management on Pletal and ASA for now  continue gabapentin.     Problem/Plan - 2:  ·  Problem: Peripheral arterial disease.  Plan: - CTA noted will start ASA 81 po qd       Problem/Plan - 3:  ·  Problem: Type 2 diabetes mellitus with hyperglycemia, with long-term current use of insulin.    continue finger sticks with short acting insulin sliding scale       Problem/Plan - 4:  ·  Problem: Hyperlipidemia, unspecified hyperlipidemia type.  Plan: - Will place on atorvastatin 10mg qhs but might require up titrating given her PAD     Problem/Plan - 5:  ·  Problem: Essential hypertension.  Plan: - lisinopril as therapeutic interchange for home benazepril  - c/w home metoprolol.      Problem/Plan - 6:  SVETA likely hemodynamic vs MARIS  creatinine 1.5   Ok for discharge per renal  will continue to monitor       Problem/Plan - 7:  ·  Problem: Need for prophylactic measure.  Plan: - HSQ for DVT ppx  - Fall precautions.     patient adamantly refusing Subacute Rehab  will discharge home
89W with history as above who presents to the hospital with complaints of b/l feet pain found to have b/l multilevel PAD.     Problem/Plan - 1:  ·  Problem: Pain in both feet.  Plan: - Vascular consult noted  d/w vascular attending  no intervention for now as patient and son's wishes   he is in agreement with the plan for conservative management on Pletal and ASA for now  continue gabapentin.     Problem/Plan - 2:  ·  Problem: Peripheral arterial disease.  Plan: - CTA noted will start ASA 81 po qd       Problem/Plan - 3:  ·  Problem: Type 2 diabetes mellitus with hyperglycemia, with long-term current use of insulin.    continue finger sticks with short acting insulin sliding scale       Problem/Plan - 4:  ·  Problem: Hyperlipidemia, unspecified hyperlipidemia type.  Plan: - Will place on atorvastatin 10mg qhs but might require up titrating given her PAD     Problem/Plan - 5:  ·  Problem: Essential hypertension.  Plan: - lisinopril as therapeutic interchange for home benazepril  - c/w home metoprolol.      Problem/Plan - 6:  SVETA likely hemodynamic vs MARIS  creatinine 1.7 today as well  will hold discharge   agree with renal to hold discharge   will continue to monitor       Problem/Plan - 7:  ·  Problem: Need for prophylactic measure.  Plan: - HSQ for DVT ppx  - Fall precautions.     patient adamantly refusing Subacute Rehab  will discharge home once SVETA resolved
89W with history as above who presents to the hospital with complaints of b/l feet pain found to have b/l multilevel PAD.     Problem/Plan - 1:  ·  Problem: Pain in both feet.  Plan: - Vascular consult noted  d/w vascular attending  no intervention for now as patient is comfortable at this time with no rest pain and any intervention carries risks of SVETA, progression to ESRD yaritza since patient is already at stage 3 CKD. D/W son at bedside in detail   he is in agreement with the plan for conservative management on Pletal and ASA for now  continue gabapentin.     Problem/Plan - 2:  ·  Problem: Peripheral arterial disease.  Plan: - CTA noted will start ASA 81 po qd       Problem/Plan - 3:  ·  Problem: Type 2 diabetes mellitus with hyperglycemia, with long-term current use of insulin.    continue finger sticks with short acting insulin sliding scale       Problem/Plan - 4:  ·  Problem: Hyperlipidemia, unspecified hyperlipidemia type.  Plan: - Will place on atorvastatin 10mg qhs but might require up titrating given her PAD     Problem/Plan - 5:  ·  Problem: Essential hypertension.  Plan: - lisinopril as therapeutic interchange for home benazepril  - c/w home metoprolol.      Problem/Plan - 6:  SVETA likely hemodynamic vs MARIS  creatinine 1.7  will hold discharge   gentle hydration  will continue to monitor       Problem/Plan - 7:  ·  Problem: Need for prophylactic measure.  Plan: - HSQ for DVT ppx  - Fall precautions.     patient adamantly refusing Subacute Rehab  will discharge home once SVETA resolved   son at bedside updated
89W with history as above who presents to the hospital with complaints of b/l feet pain found to have b/l multilevel PAD.     Problem/Plan - 1:  ·  Problem: Pain in both feet.  Plan: - Vascular consult noted  d/w vascular attending  no intervention for now as patient is comfortable at this time with no rest pain and any intervention carries risks of SVETA, progression to ESRD yaritza since patient is already at stage 3 CKD. D/W son over the phone in detail   he is in agreement with the plan for conservative management on Pletal and ASA for now  continue gabapentin.     Problem/Plan - 2:  ·  Problem: Peripheral arterial disease.  Plan: - CTA noted will start ASA 81 po qd       Problem/Plan - 3:  ·  Problem: Type 2 diabetes mellitus with hyperglycemia, with long-term current use of insulin.    continue finger sticks with short acting insulin sliding scale       Problem/Plan - 4:  ·  Problem: Hyperlipidemia, unspecified hyperlipidemia type.  Plan: - Will place on atorvastatin 10mg qhs but might require up titrating given her PAD     Problem/Plan - 5:  ·  Problem: Essential hypertension.  Plan: - lisinopril as therapeutic interchange for home benazepril  - c/w home metoprolol.      Problem/Plan - 6:  SVETA likely hemodynamic vs MARIS  will continue to monitor       Problem/Plan - 7:  ·  Problem: Need for prophylactic measure.  Plan: - HSQ for DVT ppx  - Fall precautions.     patient adamantly refusing Subacute Rehab  will discharge home once SVETA resolved
89W with history as above who presents to the hospital with complaints of b/l feet pain found to have b/l multilevel PAD.     Problem/Plan - 1:  ·  Problem: Pain in both feet.  Plan: - Vascular consult noted . Possible  Angiogram with angioplasty .  - will place patient on cilostazol (no history of heart failure) and monitor  - Tylenol prn for pain, will try to limit opioids in this elderly, frail woman  - c/w gabapentin.     Problem/Plan - 2:  ·  Problem: Peripheral arterial disease.  Plan: - Possible  Angiogram with angioplasty .Will place on cilostazol  - Patient states that she used to be on a baby aspirin but was taken off a while ago due to her recurrent falls, will therefore monitor off aspirin for now       Problem/Plan - 3:  ·  Problem: Type 2 diabetes mellitus with hyperglycemia, with long-term current use of insulin.  Plan: - Sugars in good control. On levemir 10U BID, states that her appetite has been poor, will therefore place on levemir 5U BID for now  - Seen by nutritionist on her last hospitalization, recommended to have glucerna shakes BID, will therefore order for that.      Problem/Plan - 4:  ·  Problem: Hyperlipidemia, unspecified hyperlipidemia type.  Plan: - Will place on atorvastatin 10mg qhs but might require uptitrating given her PAD  - Will check lipid profile in AM.      Problem/Plan - 5:  ·  Problem: Essential hypertension.  Plan: - lisinopril as therapeutic interchange for home benazepril  - c/w home metoprolol.      Problem/Plan - 6:  Problem: Stage III chronic kidney disease. Plan: - Stable. Renal helping.      Problem/Plan - 7:  ·  Problem: Need for prophylactic measure.  Plan: - HSQ for DVT ppx  - Fall precautions.

## 2019-04-23 NOTE — PROGRESS NOTE ADULT - SUBJECTIVE AND OBJECTIVE BOX
Patient seen and examined in bed. No pain, no SOB.      MEDICATIONS  (STANDING):  aspirin  chewable 81 milliGRAM(s) Oral daily  atorvastatin 10 milliGRAM(s) Oral at bedtime  cilostazol 100 milliGRAM(s) Oral two times a day  dextrose 5%. 1000 milliLiter(s) (50 mL/Hr) IV Continuous <Continuous>  dextrose 50% Injectable 12.5 Gram(s) IV Push once  dextrose 50% Injectable 25 Gram(s) IV Push once  dextrose 50% Injectable 25 Gram(s) IV Push once  gabapentin 100 milliGRAM(s) Oral daily  heparin  Injectable 5000 Unit(s) SubCutaneous every 8 hours  insulin detemir injectable (LEVEMIR) 5 Unit(s) SubCutaneous two times a day  insulin lispro (HumaLOG) corrective regimen sliding scale   SubCutaneous three times a day before meals  insulin lispro (HumaLOG) corrective regimen sliding scale   SubCutaneous at bedtime  magnesium oxide 400 milliGRAM(s) Oral daily  metoprolol succinate ER 25 milliGRAM(s) Oral daily  sodium chloride 0.9%. 1000 milliLiter(s) (50 mL/Hr) IV Continuous <Continuous>      VITAL:  T(C): , Max: 36.6 (04-22-19 @ 22:01)  T(F): , Max: 97.8 (04-22-19 @ 22:01)  HR: 81 (04-23-19 @ 06:04)  BP: 146/58 (04-23-19 @ 06:04)  RR: 18 (04-23-19 @ 06:04)  SpO2: 98% (04-23-19 @ 06:04)          PHYSICAL EXAM:    Constitutional: NAD  Neck:  No JVD  Respiratory: CTAB/L  Cardiovascular: S1 and S2  Gastrointestinal: BS+, soft, NT/ND  Extremities: No peripheral edema  : No Mccray  Skin: No rashes    LABS:    04-23    138  |  105  |  20  ----------------------------<  94  4.9   |  23  |  1.51<H>    Ca    8.9      23 Apr 2019 07:38  Mg     1.6     04-23        ASSESSMENT:  89F w/ DM, HTN, HLD, OA, PAD, hypomagnesemia, CKD3 p/w b/l foot pain L>R (4/16)  - CKD: stage 3 due to DM/HTN  - SVETA; due to RAAS, hypotension +/- contrast - azotemia improving  - HTN: BP acceptable  - PAD: b/l LE, painful, S/P CTA - no vascular intervention  - Hypomagnesemia - stable/improving with repetion    RECOMMEND  - continue mag ox 400 mg po daily   - can change Glucerna to Nepro given her high K  - Defer further IVF  - Strict I/O if possible   - hold RAAS inhibitors   - dose meds for a GFR ~ 30-40 mL/min  - no renal objection to discharge        Sussy Pérez, NP-C  [facility]  [phone] Patient seen and examined in bed. No pain, no SOB.      MEDICATIONS  (STANDING):  aspirin  chewable 81 milliGRAM(s) Oral daily  atorvastatin 10 milliGRAM(s) Oral at bedtime  cilostazol 100 milliGRAM(s) Oral two times a day  dextrose 5%. 1000 milliLiter(s) (50 mL/Hr) IV Continuous <Continuous>  dextrose 50% Injectable 12.5 Gram(s) IV Push once  dextrose 50% Injectable 25 Gram(s) IV Push once  dextrose 50% Injectable 25 Gram(s) IV Push once  gabapentin 100 milliGRAM(s) Oral daily  heparin  Injectable 5000 Unit(s) SubCutaneous every 8 hours  insulin detemir injectable (LEVEMIR) 5 Unit(s) SubCutaneous two times a day  insulin lispro (HumaLOG) corrective regimen sliding scale   SubCutaneous three times a day before meals  insulin lispro (HumaLOG) corrective regimen sliding scale   SubCutaneous at bedtime  magnesium oxide 400 milliGRAM(s) Oral daily  metoprolol succinate ER 25 milliGRAM(s) Oral daily  sodium chloride 0.9%. 1000 milliLiter(s) (50 mL/Hr) IV Continuous <Continuous>      VITAL:  T(C): , Max: 36.6 (04-22-19 @ 22:01)  T(F): , Max: 97.8 (04-22-19 @ 22:01)  HR: 81 (04-23-19 @ 06:04)  BP: 146/58 (04-23-19 @ 06:04)  RR: 18 (04-23-19 @ 06:04)  SpO2: 98% (04-23-19 @ 06:04)        PHYSICAL EXAM:    Constitutional: NAD  Neck:  No JVD  Respiratory: CTAB/L  Cardiovascular: S1 and S2  Gastrointestinal: BS+, soft, NT/ND  Extremities: No peripheral edema  : No Mccray  Skin: No rashes    LABS:    04-23    138  |  105  |  20  ----------------------------<  94  4.9   |  23  |  1.51<H>    Ca    8.9      23 Apr 2019 07:38  Mg     1.6     04-23        ASSESSMENT:  89F w/ DM, HTN, HLD, OA, PAD, hypomagnesemia, CKD3 p/w b/l foot pain L>R (4/16)  - CKD: stage 3 due to DM/HTN  - SVETA; due to RAAS, hypotension +/- contrast - azotemia improving  - HTN: BP acceptable  - PAD: b/l LE, painful, S/P CTA - no vascular intervention  - Hypomagnesemia - stable/improving with repetion    RECOMMEND  - continue mag ox 400 mg po daily   - can change Glucerna to Nepro given her high K  - Defer further IVF  - Strict I/O if possible   - hold RAAS inhibitors   - dose meds for a GFR ~ 30-40 mL/min  - no renal objection to discharge        Sussy Pérez, NP-C  [facility]  [phone]

## 2019-04-23 NOTE — PROGRESS NOTE ADULT - SUBJECTIVE AND OBJECTIVE BOX
Patient is a 89y old  Female who presents with a chief complaint of b/l foot pain (23 Apr 2019 11:27)    SUBJECTIVE / OVERNIGHT EVENTS: no overnight events    ROS:  Resp: No cough no sputum production  CVS: No chest pain no palpitations no orthopnea  GI: no N/V/D  : no dysuria, no hematuria  Neuro: no weakness no paresthesias  Heme: No petechiae no easy bruising  Msk: No joint pain no swelling  Skin: No rash no itching        MEDICATIONS  (STANDING):  aspirin  chewable 81 milliGRAM(s) Oral daily  atorvastatin 10 milliGRAM(s) Oral at bedtime  cilostazol 100 milliGRAM(s) Oral two times a day  dextrose 5%. 1000 milliLiter(s) (50 mL/Hr) IV Continuous <Continuous>  dextrose 50% Injectable 12.5 Gram(s) IV Push once  dextrose 50% Injectable 25 Gram(s) IV Push once  dextrose 50% Injectable 25 Gram(s) IV Push once  gabapentin 100 milliGRAM(s) Oral daily  heparin  Injectable 5000 Unit(s) SubCutaneous every 8 hours  insulin detemir injectable (LEVEMIR) 5 Unit(s) SubCutaneous two times a day  insulin lispro (HumaLOG) corrective regimen sliding scale   SubCutaneous three times a day before meals  insulin lispro (HumaLOG) corrective regimen sliding scale   SubCutaneous at bedtime  metoprolol succinate ER 25 milliGRAM(s) Oral daily  sodium chloride 0.9%. 1000 milliLiter(s) (50 mL/Hr) IV Continuous <Continuous>    MEDICATIONS  (PRN):  acetaminophen   Tablet .. 650 milliGRAM(s) Oral every 6 hours PRN Moderate Pain (4 - 6)  dextrose 40% Gel 15 Gram(s) Oral once PRN Blood Glucose LESS THAN 70 milliGRAM(s)/deciliter  glucagon  Injectable 1 milliGRAM(s) IntraMuscular once PRN Glucose LESS THAN 70 milligrams/deciliter        CAPILLARY BLOOD GLUCOSE      POCT Blood Glucose.: 112 mg/dL (23 Apr 2019 12:35)  POCT Blood Glucose.: 101 mg/dL (23 Apr 2019 08:32)  POCT Blood Glucose.: 134 mg/dL (22 Apr 2019 22:18)  POCT Blood Glucose.: 100 mg/dL (22 Apr 2019 17:15)    I&O's Summary      Vital Signs Last 24 Hrs  T(C): 36.8 (23 Apr 2019 11:51), Max: 36.8 (23 Apr 2019 11:51)  T(F): 98.2 (23 Apr 2019 11:51), Max: 98.2 (23 Apr 2019 11:51)  HR: 82 (23 Apr 2019 11:51) (81 - 88)  BP: 132/54 (23 Apr 2019 11:51) (132/54 - 146/58)  BP(mean): --  RR: 18 (23 Apr 2019 11:51) (18 - 18)  SpO2: 100% (23 Apr 2019 11:51) (98% - 100%)    PHYSICAL EXAM:  GENERAL: NAD, not in any apparent distress ,  HEAD:  Atraumatic, Normocephalic  NECK: Supple, No JVD, Normal thyroid  NERVOUS SYSTEM:  Alert & Oriented, No focal deficit   CHEST/LUNG: Good air entry bilateral with no  rales, rhonchi, wheezing, or rubs  HEART: Regular rate and rhythm; No murmurs, rubs, or gallops  ABDOMEN: Soft, Nontender, Nondistended; Bowel sounds present  EXTREMITIES:  no tenderness, warm     LABS:    04-23    138  |  105  |  20  ----------------------------<  94  4.9   |  23  |  1.51<H>    Ca    8.9      23 Apr 2019 07:38  Mg     1.6     04-23                  All consultant(s) notes reviewed and care discussed with other providers        Contact Number, Dr Fernando [phone]

## 2019-05-06 ENCOUNTER — APPOINTMENT (OUTPATIENT)
Dept: VASCULAR SURGERY | Facility: CLINIC | Age: 84
End: 2019-05-06

## 2019-05-06 ENCOUNTER — INPATIENT (INPATIENT)
Facility: HOSPITAL | Age: 84
LOS: 2 days | Discharge: NURSING, REHAB OR LONG-TERM CARE | End: 2019-05-09
Attending: INTERNAL MEDICINE | Admitting: INTERNAL MEDICINE
Payer: MEDICARE

## 2019-05-06 VITALS
DIASTOLIC BLOOD PRESSURE: 91 MMHG | TEMPERATURE: 98 F | RESPIRATION RATE: 18 BRPM | HEART RATE: 64 BPM | SYSTOLIC BLOOD PRESSURE: 150 MMHG | OXYGEN SATURATION: 100 %

## 2019-05-06 DIAGNOSIS — M79.671 PAIN IN RIGHT FOOT: ICD-10-CM

## 2019-05-06 DIAGNOSIS — I10 ESSENTIAL (PRIMARY) HYPERTENSION: ICD-10-CM

## 2019-05-06 DIAGNOSIS — E11.9 TYPE 2 DIABETES MELLITUS WITHOUT COMPLICATIONS: ICD-10-CM

## 2019-05-06 DIAGNOSIS — Z98.41 CATARACT EXTRACTION STATUS, RIGHT EYE: Chronic | ICD-10-CM

## 2019-05-06 DIAGNOSIS — N18.3 CHRONIC KIDNEY DISEASE, STAGE 3 (MODERATE): ICD-10-CM

## 2019-05-06 DIAGNOSIS — Z98.890 OTHER SPECIFIED POSTPROCEDURAL STATES: Chronic | ICD-10-CM

## 2019-05-06 DIAGNOSIS — E78.5 HYPERLIPIDEMIA, UNSPECIFIED: ICD-10-CM

## 2019-05-06 LAB
ANION GAP SERPL CALC-SCNC: 10 MMO/L — SIGNIFICANT CHANGE UP (ref 7–14)
BUN SERPL-MCNC: 17 MG/DL — SIGNIFICANT CHANGE UP (ref 7–23)
CALCIUM SERPL-MCNC: 9.7 MG/DL — SIGNIFICANT CHANGE UP (ref 8.4–10.5)
CHLORIDE SERPL-SCNC: 104 MMOL/L — SIGNIFICANT CHANGE UP (ref 98–107)
CO2 SERPL-SCNC: 24 MMOL/L — SIGNIFICANT CHANGE UP (ref 22–31)
CREAT SERPL-MCNC: 1.43 MG/DL — HIGH (ref 0.5–1.3)
GLUCOSE BLDC GLUCOMTR-MCNC: 209 MG/DL — HIGH (ref 70–99)
GLUCOSE SERPL-MCNC: 163 MG/DL — HIGH (ref 70–99)
HCT VFR BLD CALC: 36.7 % — SIGNIFICANT CHANGE UP (ref 34.5–45)
HGB BLD-MCNC: 11.5 G/DL — SIGNIFICANT CHANGE UP (ref 11.5–15.5)
MCHC RBC-ENTMCNC: 28.6 PG — SIGNIFICANT CHANGE UP (ref 27–34)
MCHC RBC-ENTMCNC: 31.3 % — LOW (ref 32–36)
MCV RBC AUTO: 91.3 FL — SIGNIFICANT CHANGE UP (ref 80–100)
NRBC # FLD: 0 K/UL — SIGNIFICANT CHANGE UP (ref 0–0)
PLATELET # BLD AUTO: 264 K/UL — SIGNIFICANT CHANGE UP (ref 150–400)
PMV BLD: 10.5 FL — SIGNIFICANT CHANGE UP (ref 7–13)
POTASSIUM SERPL-MCNC: 4.8 MMOL/L — SIGNIFICANT CHANGE UP (ref 3.5–5.3)
POTASSIUM SERPL-SCNC: 4.8 MMOL/L — SIGNIFICANT CHANGE UP (ref 3.5–5.3)
RBC # BLD: 4.02 M/UL — SIGNIFICANT CHANGE UP (ref 3.8–5.2)
RBC # FLD: 15.9 % — HIGH (ref 10.3–14.5)
SODIUM SERPL-SCNC: 138 MMOL/L — SIGNIFICANT CHANGE UP (ref 135–145)
WBC # BLD: 4.86 K/UL — SIGNIFICANT CHANGE UP (ref 3.8–10.5)
WBC # FLD AUTO: 4.86 K/UL — SIGNIFICANT CHANGE UP (ref 3.8–10.5)

## 2019-05-06 RX ORDER — ACETAMINOPHEN 500 MG
650 TABLET ORAL EVERY 6 HOURS
Qty: 0 | Refills: 0 | Status: DISCONTINUED | OUTPATIENT
Start: 2019-05-06 | End: 2019-05-09

## 2019-05-06 RX ORDER — DEXTROSE 50 % IN WATER 50 %
12.5 SYRINGE (ML) INTRAVENOUS ONCE
Qty: 0 | Refills: 0 | Status: DISCONTINUED | OUTPATIENT
Start: 2019-05-06 | End: 2019-05-09

## 2019-05-06 RX ORDER — INSULIN DETEMIR 100/ML (3)
10 INSULIN PEN (ML) SUBCUTANEOUS
Qty: 0 | Refills: 0 | Status: DISCONTINUED | OUTPATIENT
Start: 2019-05-06 | End: 2019-05-06

## 2019-05-06 RX ORDER — ACETAMINOPHEN 500 MG
650 TABLET ORAL ONCE
Qty: 0 | Refills: 0 | Status: COMPLETED | OUTPATIENT
Start: 2019-05-06 | End: 2019-05-06

## 2019-05-06 RX ORDER — GLUCAGON INJECTION, SOLUTION 0.5 MG/.1ML
1 INJECTION, SOLUTION SUBCUTANEOUS ONCE
Qty: 0 | Refills: 0 | Status: DISCONTINUED | OUTPATIENT
Start: 2019-05-06 | End: 2019-05-09

## 2019-05-06 RX ORDER — SODIUM CHLORIDE 9 MG/ML
1000 INJECTION, SOLUTION INTRAVENOUS
Qty: 0 | Refills: 0 | Status: DISCONTINUED | OUTPATIENT
Start: 2019-05-06 | End: 2019-05-09

## 2019-05-06 RX ORDER — DEXTROSE 50 % IN WATER 50 %
25 SYRINGE (ML) INTRAVENOUS ONCE
Qty: 0 | Refills: 0 | Status: DISCONTINUED | OUTPATIENT
Start: 2019-05-06 | End: 2019-05-09

## 2019-05-06 RX ORDER — HYDRALAZINE HCL 50 MG
10 TABLET ORAL ONCE
Qty: 0 | Refills: 0 | Status: COMPLETED | OUTPATIENT
Start: 2019-05-06 | End: 2019-05-06

## 2019-05-06 RX ORDER — LISINOPRIL 2.5 MG/1
5 TABLET ORAL DAILY
Qty: 0 | Refills: 0 | Status: DISCONTINUED | OUTPATIENT
Start: 2019-05-06 | End: 2019-05-09

## 2019-05-06 RX ORDER — LABETALOL HCL 100 MG
100 TABLET ORAL ONCE
Qty: 0 | Refills: 0 | Status: DISCONTINUED | OUTPATIENT
Start: 2019-05-06 | End: 2019-05-06

## 2019-05-06 RX ORDER — METOPROLOL TARTRATE 50 MG
25 TABLET ORAL DAILY
Qty: 0 | Refills: 0 | Status: DISCONTINUED | OUTPATIENT
Start: 2019-05-06 | End: 2019-05-09

## 2019-05-06 RX ORDER — CILOSTAZOL 100 MG/1
100 TABLET ORAL
Qty: 0 | Refills: 0 | Status: DISCONTINUED | OUTPATIENT
Start: 2019-05-06 | End: 2019-05-09

## 2019-05-06 RX ORDER — DEXTROSE 50 % IN WATER 50 %
15 SYRINGE (ML) INTRAVENOUS ONCE
Qty: 0 | Refills: 0 | Status: DISCONTINUED | OUTPATIENT
Start: 2019-05-06 | End: 2019-05-09

## 2019-05-06 RX ORDER — INSULIN DETEMIR 100/ML (3)
5 INSULIN PEN (ML) SUBCUTANEOUS
Qty: 0 | Refills: 0 | Status: DISCONTINUED | OUTPATIENT
Start: 2019-05-06 | End: 2019-05-09

## 2019-05-06 RX ORDER — ASPIRIN/CALCIUM CARB/MAGNESIUM 324 MG
81 TABLET ORAL DAILY
Qty: 0 | Refills: 0 | Status: DISCONTINUED | OUTPATIENT
Start: 2019-05-06 | End: 2019-05-09

## 2019-05-06 RX ORDER — SIMVASTATIN 20 MG/1
20 TABLET, FILM COATED ORAL AT BEDTIME
Qty: 0 | Refills: 0 | Status: DISCONTINUED | OUTPATIENT
Start: 2019-05-06 | End: 2019-05-09

## 2019-05-06 RX ORDER — INSULIN LISPRO 100/ML
VIAL (ML) SUBCUTANEOUS AT BEDTIME
Qty: 0 | Refills: 0 | Status: DISCONTINUED | OUTPATIENT
Start: 2019-05-06 | End: 2019-05-09

## 2019-05-06 RX ORDER — INSULIN LISPRO 100/ML
VIAL (ML) SUBCUTANEOUS
Qty: 0 | Refills: 0 | Status: DISCONTINUED | OUTPATIENT
Start: 2019-05-06 | End: 2019-05-09

## 2019-05-06 RX ORDER — GABAPENTIN 400 MG/1
100 CAPSULE ORAL DAILY
Qty: 0 | Refills: 0 | Status: DISCONTINUED | OUTPATIENT
Start: 2019-05-06 | End: 2019-05-09

## 2019-05-06 RX ADMIN — SIMVASTATIN 20 MILLIGRAM(S): 20 TABLET, FILM COATED ORAL at 21:26

## 2019-05-06 RX ADMIN — Medication 650 MILLIGRAM(S): at 18:31

## 2019-05-06 RX ADMIN — Medication 650 MILLIGRAM(S): at 17:32

## 2019-05-06 RX ADMIN — Medication 10 MILLIGRAM(S): at 16:46

## 2019-05-06 NOTE — ED ADULT NURSE NOTE - OBJECTIVE STATEMENT
Pt arrived to room 7, on stretcher. Pt is A&Ox2-3, calm pleasant and cooperative. Pt states "I should not be here, I'm supposed to be at vascular doctor appointment". Pt gave paper with address and location of her appointment today. Provider at bedside, will call family member to confirm. As per provider, awaiting son's arrival to hospital to further evaluate. Pt appears comfortable, well groomed Pt arrived to room 7, on stretcher. Pt is A&Ox2-3, calm pleasant and cooperative. Pt states "I should not be here, I'm supposed to be at vascular doctor appointment". Pt gave paper with address and location of her appointment today. Provider at bedside, will call family member to confirm. As per provider, awaiting son's arrival to hospital to further evaluate. Pt complains of pain to feet, discoloration and mild swelling to left big toe. Skin is intact, placed on cardiac monitor. BP elevated 218/79, MD made aware, will review home medication. Waiting for medication order. Will continue to monitor.

## 2019-05-06 NOTE — ED PROVIDER NOTE - CARE PLAN
Principal Discharge DX:	Foot pain, bilateral Principal Discharge DX:	Foot pain, bilateral  Secondary Diagnosis:	Peripheral artery disease  Secondary Diagnosis:	Ambulatory dysfunction

## 2019-05-06 NOTE — H&P ADULT - PROBLEM SELECTOR PLAN 1
Likely sec to PVD . Vascular consulted last admission advised conservative management .On Pletal and Aspirin.

## 2019-05-06 NOTE — ED ADULT TRIAGE NOTE - CHIEF COMPLAINT QUOTE
Patient is a&ox2. Patient called ambulance because "I couldn't go up the stairs for my appointment." Patient reports pain in her feet, as per patient, "it's always like that." Patient lives alone in basement of apartment.

## 2019-05-06 NOTE — ED PROVIDER NOTE - CLINICAL SUMMARY MEDICAL DECISION MAKING FREE TEXT BOX
89yo F with multiple vascular risk factors and PAD coming in with worsening foot pain not amenable to surgery. Will consult social work regarding possible rehab. Will discuss with pt and family about possibly having her live with her son. Will not be obtaining labs in light of recent admission; known cause for pain and no other new symptoms.

## 2019-05-06 NOTE — H&P PST ADULT - NSANTHBPHIGHRD_ENT_A_CORE
Subjective:  
79 y.o. female for Well Woman Check. No LMP recorded. (Menstrual status: Menopause). Social History: single partner, contraception - none. Pertinent past medical hstory: no history of HTN, DVT, CAD, DM, liver disease, migraines or smoking. Current Outpatient Medications Medication Sig Dispense Refill  levothyroxine (SYNTHROID) 88 mcg tablet TAKE ONE TABLET BY MOUTH DAILY BEFORE BREAKFAST 90 Tab 3  
 citalopram (CELEXA) 20 mg tablet TAKE ONE TABLET BY MOUTH DAILY 90 Tab 3  
 bimatoprost (LUMIGAN) 0.01 % ophthalmic drops Administer 1 Drop to both eyes every evening.  FOLBIC 2.5-25-2 mg tablet TAKE ONE TABLET BY MOUTH ONCE A DAY 90 Tab 2  
 LORazepam (ATIVAN) 0.5 mg tablet Take 1 Tab by mouth every eight (8) hours as needed for Anxiety. 40 Tab 1  
 FOLBIC 2.5-25-2 mg tablet TAKE ONE TABLET BY MOUTH ONCE A DAY 90 Tab 2 Allergies Allergen Reactions  Amoxicillin Rash Past Medical History:  
Diagnosis Date  Hypercholesterolemia  Panic disorder without agoraphobia  Thyroid disease Past Surgical History:  
Procedure Laterality Date  ENDOSCOPY, COLON, DIAGNOSTIC Family History Problem Relation Age of Onset  Stroke Mother  Heart Disease Father  Cancer Father   
     mesothelioma  Heart Disease Brother Social History Tobacco Use  Smoking status: Never Smoker  Smokeless tobacco: Never Used Substance Use Topics  Alcohol use: Yes ROS:  Feeling well. No dyspnea or chest pain on exertion. No abdominal pain, change in bowel habits, black or bloody stools. No urinary tract symptoms. GYN ROS: no breast pain or new or enlarging lumps on self exam, no vaginal bleeding, no discharge or pelvic pain, no hot flashes. No neurological complaints. Objective:  
 
Visit Vitals /86 (BP 1 Location: Left arm, BP Patient Position: Sitting) Pulse 62 Resp 18 Ht 5' 4\" (1.626 m) Wt 156 lb (70.8 kg) BMI 26.78 kg/m² The patient appears well, alert, oriented x 3, in no distress. ENT normal.  Neck supple. No adenopathy or thyromegaly. EVAN. Lungs are clear, good air entry, no wheezes, rhonchi or rales. S1 and S2 normal, no murmurs, regular rate and rhythm. Abdomen soft without tenderness, guarding, mass or organomegaly. Extremities show no edema, normal peripheral pulses. Neurological is normal, no focal findings. BREAST EXAM: breasts appear normal, no suspicious masses, no skin or nipple changes or axillary nodes PELVIC EXAM: normal external genitalia, vulva, vagina, cervix, uterus and adnexa Assessment/Plan:  
well woman Mammogram done 
pap smear not indicated 
return annually or prn Plan of care discussed. Patient expressed understanding. Yes

## 2019-05-06 NOTE — ED PROVIDER NOTE - ATTENDING CONTRIBUTION TO CARE
Marino Tucker MD:   HPI: 89 F w/ Hx of PAD, DM, HTN, who p/w B/L lower leg pain, from the mid-shin and distally. Complaints and severity of discomfort is not increased from previous. No trauma or acute injury. Lives at home alone, and has an aide that comes in 4 times a week. Pt comes in for L>R foot pain, and was unable to get to her Vascular appointment. Decision from vascular    ROS: no F/C, no CP, no SOB, + leg pain    PE: no palpable pulses but able to get pulses on Doppler b/l w/ DP and PT.     MDM: Will get SW to help patient w/ services at home vs possible rehab. Pt w/o emergent medical conditions or change in condition that would warrant further work-up in the ED. HOwever, pt w/ difficulty taking care of herself at home by herself. Marino Tucker MD:   HPI: 89 F w/ Hx of PAD, DM, HTN, who p/w B/L lower leg pain, from the mid-shin and distally. Complaints and severity of discomfort is not increased from previous. No trauma or acute injury. Lives at home alone, and has an aide that comes in 4 times a week. Pt comes in for L>R foot pain, and was unable to get to her Vascular appointment. Decision from vascular    ROS: no F/C, no CP, no SOB, + leg pain    PE: no palpable pulses but able to get pulses on Doppler b/l w/ DP and PT.     MDM: Will get SW to help patient w/ services at home vs possible rehab. Pt w/o emergent medical conditions or change in condition that would warrant further work-up in the ED. HOwever, pt w/ difficulty taking care of herself at home by herself. Pt found to have asymptomatic HTN > 200 in the ED. will screen for end organ damage w/ U/A and BMP. will give home PO antiHTN meds. Marino Tucker MD:   HPI: 89 F w/ Hx of PAD, DM, HTN, who p/w B/L lower leg pain, from the mid-shin and distally. Complaints and severity of discomfort is not increased from previous. No trauma or acute injury. Lives at home alone, and has an aide that comes in 4 times a week. Pt comes in for L>R foot pain, and was unable to get to her Vascular appointment. Decision from vascular to withhold surgical intervention    ROS: no F/C, no CP, no SOB, + leg pain    PE: elderly F who is in NAD, NRRR, CTAB, no abd tenderness, no palpable pulses but able to get pulses on Doppler b/l w/ DP and PT, + TTP to the L forefoot. no bony deformity or step off.     MDM: Will get SW to help patient w/ services at home vs possible rehab. Foot pain unchanged, and likely due to significant PAD. Vascular surgery already had recommended no surgical intervention and to pursue conservative Tx. Pt w/o emergent medical conditions or change in condition that would warrant further work-up in the ED. HOwever, pt w/ difficulty taking care of herself at home by herself. Pt found to have asymptomatic HTN > 200 in the ED. will screen for end organ damage w/ U/A and BMP. will give home PO antiHTN meds.

## 2019-05-06 NOTE — SOCIAL WORK INITIAL EVALUATION ADULT - PLAN
Spoke with patient and son Sorin at bedside in ED.  Patient is a 89 year old AA, , Hindu female.  She is alert and oriented x 4, with some forgetfulness.   Patient came to [facility] today because of difficulty managing stairs in her basement apartment and general problems with ambulation.   Last hospitalization patient declined stay at subacute rehab, and went home with home care.  Patient now states that she would like to go to rehab.  She would like to go to [facility], as she has been there several times in the past and "they know her."   Awaiting PT eval.  HIP auth will be required for transfer to rehab.  SW to follow.

## 2019-05-06 NOTE — ED PROVIDER NOTE - OBJECTIVE STATEMENT
89yo F with a PMHx of T2DM, PAD, HTN, HLD w/ a recent admission for leg pain found to have severe PAD and not a candidate for surgery coming in for leg pain. She lives in the basement of a house and was having too much pain when trying to walk up the stairs to get home. She called the police to help transport her to her vascular appointment and was instead taken here. She denies any chest pain, nausea, vomiting, diarrhea, abdominal pain, shortness of breath, fevers, chills or recent travel. She has an aide that comes to help her for 4 days a week for roughly 4 hours.

## 2019-05-06 NOTE — ED ADULT NURSE REASSESSMENT NOTE - NS ED NURSE REASSESS COMMENT FT1
Pt in room, resting comfortably, cheerful affect. Comfort measures met. Bp medication administered. Pt complains of pain to feet, MD made aware, awaiting medication order. 22 G IV access to right AC, Labs drawn and sent. . Will continue to monitor.

## 2019-05-07 ENCOUNTER — TRANSCRIPTION ENCOUNTER (OUTPATIENT)
Age: 84
End: 2019-05-07

## 2019-05-07 LAB
ANION GAP SERPL CALC-SCNC: 9 MMO/L — SIGNIFICANT CHANGE UP (ref 7–14)
BUN SERPL-MCNC: 17 MG/DL — SIGNIFICANT CHANGE UP (ref 7–23)
CALCIUM SERPL-MCNC: 8.6 MG/DL — SIGNIFICANT CHANGE UP (ref 8.4–10.5)
CHLORIDE SERPL-SCNC: 107 MMOL/L — SIGNIFICANT CHANGE UP (ref 98–107)
CO2 SERPL-SCNC: 22 MMOL/L — SIGNIFICANT CHANGE UP (ref 22–31)
CREAT SERPL-MCNC: 1.38 MG/DL — HIGH (ref 0.5–1.3)
GLUCOSE BLDC GLUCOMTR-MCNC: 147 MG/DL — HIGH (ref 70–99)
GLUCOSE BLDC GLUCOMTR-MCNC: 171 MG/DL — HIGH (ref 70–99)
GLUCOSE BLDC GLUCOMTR-MCNC: 190 MG/DL — HIGH (ref 70–99)
GLUCOSE BLDC GLUCOMTR-MCNC: 198 MG/DL — HIGH (ref 70–99)
GLUCOSE SERPL-MCNC: 144 MG/DL — HIGH (ref 70–99)
HCT VFR BLD CALC: 30.9 % — LOW (ref 34.5–45)
HGB BLD-MCNC: 9.7 G/DL — LOW (ref 11.5–15.5)
MCHC RBC-ENTMCNC: 29 PG — SIGNIFICANT CHANGE UP (ref 27–34)
MCHC RBC-ENTMCNC: 31.4 % — LOW (ref 32–36)
MCV RBC AUTO: 92.2 FL — SIGNIFICANT CHANGE UP (ref 80–100)
NRBC # FLD: 0 K/UL — SIGNIFICANT CHANGE UP (ref 0–0)
PLATELET # BLD AUTO: 205 K/UL — SIGNIFICANT CHANGE UP (ref 150–400)
PMV BLD: 10.4 FL — SIGNIFICANT CHANGE UP (ref 7–13)
POTASSIUM SERPL-MCNC: 4.8 MMOL/L — SIGNIFICANT CHANGE UP (ref 3.5–5.3)
POTASSIUM SERPL-SCNC: 4.8 MMOL/L — SIGNIFICANT CHANGE UP (ref 3.5–5.3)
RBC # BLD: 3.35 M/UL — LOW (ref 3.8–5.2)
RBC # FLD: 16.1 % — HIGH (ref 10.3–14.5)
SODIUM SERPL-SCNC: 138 MMOL/L — SIGNIFICANT CHANGE UP (ref 135–145)
WBC # BLD: 3.53 K/UL — LOW (ref 3.8–10.5)
WBC # FLD AUTO: 3.53 K/UL — LOW (ref 3.8–10.5)

## 2019-05-07 RX ORDER — TRAMADOL HYDROCHLORIDE 50 MG/1
25 TABLET ORAL EVERY 8 HOURS
Qty: 0 | Refills: 0 | Status: DISCONTINUED | OUTPATIENT
Start: 2019-05-07 | End: 2019-05-09

## 2019-05-07 RX ORDER — INSULIN DETEMIR 100/ML (3)
2 INSULIN PEN (ML) SUBCUTANEOUS ONCE
Qty: 0 | Refills: 0 | Status: DISCONTINUED | OUTPATIENT
Start: 2019-05-07 | End: 2019-05-07

## 2019-05-07 RX ORDER — TRAMADOL HYDROCHLORIDE 50 MG/1
25 TABLET ORAL ONCE
Qty: 0 | Refills: 0 | Status: DISCONTINUED | OUTPATIENT
Start: 2019-05-07 | End: 2019-05-07

## 2019-05-07 RX ADMIN — CILOSTAZOL 100 MILLIGRAM(S): 100 TABLET ORAL at 05:17

## 2019-05-07 RX ADMIN — Medication 2: at 18:07

## 2019-05-07 RX ADMIN — SIMVASTATIN 20 MILLIGRAM(S): 20 TABLET, FILM COATED ORAL at 21:50

## 2019-05-07 RX ADMIN — Medication 2: at 13:02

## 2019-05-07 RX ADMIN — Medication 81 MILLIGRAM(S): at 12:41

## 2019-05-07 RX ADMIN — LISINOPRIL 5 MILLIGRAM(S): 2.5 TABLET ORAL at 05:18

## 2019-05-07 RX ADMIN — TRAMADOL HYDROCHLORIDE 25 MILLIGRAM(S): 50 TABLET ORAL at 11:25

## 2019-05-07 RX ADMIN — TRAMADOL HYDROCHLORIDE 25 MILLIGRAM(S): 50 TABLET ORAL at 03:24

## 2019-05-07 RX ADMIN — Medication 5 UNIT(S): at 21:48

## 2019-05-07 RX ADMIN — GABAPENTIN 100 MILLIGRAM(S): 400 CAPSULE ORAL at 12:41

## 2019-05-07 RX ADMIN — Medication 25 MILLIGRAM(S): at 05:18

## 2019-05-07 RX ADMIN — TRAMADOL HYDROCHLORIDE 25 MILLIGRAM(S): 50 TABLET ORAL at 10:59

## 2019-05-07 RX ADMIN — CILOSTAZOL 100 MILLIGRAM(S): 100 TABLET ORAL at 17:26

## 2019-05-07 RX ADMIN — TRAMADOL HYDROCHLORIDE 25 MILLIGRAM(S): 50 TABLET ORAL at 02:38

## 2019-05-07 NOTE — PATIENT PROFILE ADULT - FALL HARM RISK
bones(Osteoporosis,prev fx,steroid use,metastatic bone ca)/age(85 years old or older)/fall hx, weakness/other

## 2019-05-07 NOTE — PROGRESS NOTE ADULT - SUBJECTIVE AND OBJECTIVE BOX
Patient is a 89y old  Female who presents with a chief complaint of pain foot toe (06 May 2019 20:11)  patient well known to me, assigned this AM to assume care  chart reviewed and events thus far noted   admitted overnight by full time hospitalist service     SUBJECTIVE / OVERNIGHT EVENTS: no overnight events  sitting in chair in absolutely no apparent distress     ROS:  Resp: No cough no sputum production  CVS: No chest pain no palpitations no orthopnea  GI: no N/V/D  : no dysuria, no hematuria  Neuro: no weakness no paresthesias  Heme: No petechiae no easy bruising  Msk: No joint pain no swelling  Skin: No rash no itching        MEDICATIONS  (STANDING):  aspirin  chewable 81 milliGRAM(s) Oral daily  cilostazol 100 milliGRAM(s) Oral two times a day  dextrose 5%. 1000 milliLiter(s) (50 mL/Hr) IV Continuous <Continuous>  dextrose 50% Injectable 12.5 Gram(s) IV Push once  dextrose 50% Injectable 25 Gram(s) IV Push once  dextrose 50% Injectable 25 Gram(s) IV Push once  gabapentin 100 milliGRAM(s) Oral daily  insulin detemir injectable (LEVEMIR) 5 Unit(s) SubCutaneous two times a day  insulin lispro (HumaLOG) corrective regimen sliding scale   SubCutaneous three times a day before meals  insulin lispro (HumaLOG) corrective regimen sliding scale   SubCutaneous at bedtime  lisinopril 5 milliGRAM(s) Oral daily  metoprolol tartrate 25 milliGRAM(s) Oral daily  simvastatin 20 milliGRAM(s) Oral at bedtime    MEDICATIONS  (PRN):  acetaminophen   Tablet .. 650 milliGRAM(s) Oral every 6 hours PRN Temp greater or equal to 38.5C (101.3F), Moderate Pain (4 - 6)  dextrose 40% Gel 15 Gram(s) Oral once PRN Blood Glucose LESS THAN 70 milliGRAM(s)/deciliter  glucagon  Injectable 1 milliGRAM(s) IntraMuscular once PRN Glucose LESS THAN 70 milligrams/deciliter  traMADol 25 milliGRAM(s) Oral every 8 hours PRN Moderate to Severe Pain        CAPILLARY BLOOD GLUCOSE      POCT Blood Glucose.: 147 mg/dL (07 May 2019 08:51)  POCT Blood Glucose.: 209 mg/dL (06 May 2019 21:22)  POCT Blood Glucose.: 142 mg/dL (06 May 2019 17:29)  POCT Blood Glucose.: 171 mg/dL (06 May 2019 13:57)    I&O's Summary      Vital Signs Last 24 Hrs  T(C): 36.5 (07 May 2019 05:09), Max: 36.7 (06 May 2019 13:58)  T(F): 97.7 (07 May 2019 05:09), Max: 98 (06 May 2019 13:58)  HR: 66 (07 May 2019 05:09) (60 - 76)  BP: 115/50 (07 May 2019 05:09) (115/50 - 218/79)  BP(mean): 76 (06 May 2019 20:11) (76 - 76)  RR: 17 (07 May 2019 05:09) (14 - 18)  SpO2: 98% (07 May 2019 05:09) (98% - 100%)    PHYSICAL EXAM:  GENERAL: NAD,   HEAD:  Atraumatic, Normocephalic  EYES: EOMI, PERRLA, conjunctiva and sclera clear  NECK: Supple, No JVD  CHEST/LUNG: no rhonchi, no wheeze, clear to auscultation bilaterally  HEART: S1 S2; soft ejection systolic murmur best heard at left sternal border No rubs or gallops   ABDOMEN: Soft, Nontender, Nondistended; Bowel sounds present  EXTREMITIES:  No clubbing or cyanosis, no edema  PSYCH: AO x 3 appropriate affect [place] bilaterally   NEUROLOGY: non-focal, motor and sensory systems intact  SKIN: No rashes or lesions    LABS:                        9.7    3.53  )-----------( 205      ( 07 May 2019 06:45 )             30.9     05-07    138  |  107  |  17  ----------------------------<  144<H>  4.8   |  22  |  1.38<H>    Ca    8.6      07 May 2019 06:45                  All consultant(s) notes reviewed and care discussed with other providers        Contact Number, Dr Fernando [phone]

## 2019-05-07 NOTE — DISCHARGE NOTE PROVIDER - PROVIDER TOKENS
PROVIDER:[TOKEN:[[id number]]] PROVIDER:[TOKEN:[[id number]]],FREE:[LAST:[Medical provider],PHONE:[(   )    -],FAX:[(   )    -],ADDRESS:[at rehab facility]]

## 2019-05-07 NOTE — PHYSICAL THERAPY INITIAL EVALUATION ADULT - PERTINENT HX OF CURRENT PROBLEM, REHAB EVAL
89 y/o Female with a PMHx of T2DM, PAD, HTN, HLD w/ a recent admission for leg pain found to have severe PAD and not a candidate for surgery coming in for leg pain.

## 2019-05-07 NOTE — PATIENT PROFILE ADULT - ANY IMPAIRED UPPER EXTREMITY FUNCTION WITHIN A WEEK PRIOR TO ADMISSION RELATED TO?
Problem: Adult Behavioral Health Plan of Care  Goal: Plan of Care Review  Outcome: Ongoing (interventions implemented as appropriate)  Visible on unit during evening hours.  Compliant with scheduled HS medication and snack.  HS POCT glucose 108.  No SI/HI/AVH or physical complaints reported thus far this shift.  Retired to bed at appropriate time.  Later, patient reported to A that she felt like she might bang her head against the wall and was observed sitting up in chair in her room at length.  Patient was then placed in quiet room to sleep [without difficulty].  Safety maintained.  Q 15 minute observation continues.         no

## 2019-05-07 NOTE — DISCHARGE NOTE PROVIDER - CARE PROVIDER_API CALL
Judy Vasquez)  Surgery  [address], [address]  [place] [address]  Phone: [phone]  Fax: [phone]  Follow Up Time: Judy Vasquez)  Surgery  [address] [facility], [address]  [place] [address]  Phone: [phone]  Fax: [phone]  Follow Up Time:     Medical provider,   at rehab facility  Phone: (   )    -  Fax: (   )    -  Follow Up Time:

## 2019-05-07 NOTE — DISCHARGE NOTE PROVIDER - NSDCCPCAREPLAN_GEN_ALL_CORE_FT
PRINCIPAL DISCHARGE DIAGNOSIS  Diagnosis: Foot pain, bilateral  Assessment and Plan of Treatment: This is likely due to peripheral vascular disease. You were seen by Surgery last admission and recommended for conservative management. Continue ASA and Pletal. Follow up outpatient Vascular surgery in 1-2 weeks for further management.      SECONDARY DISCHARGE DIAGNOSES  Diagnosis: HLD (hyperlipidemia)  Assessment and Plan of Treatment: Continue cholesterol control medications. Continue DASH diet. Follow up with your PCP within 1 week of discharge for further management and monitoring of lipid and cholesterol panels.      Diagnosis: HTN (hypertension)  Assessment and Plan of Treatment: Continue blood pressure medication regimen as directed. Monitor for any visual changes, headaches or dizziness.  Monitor blood pressure regularly.  Follow up with your PCP for further management for high blood pressure.      Diagnosis: Diabetes mellitus  Assessment and Plan of Treatment: Continue consistent carbohydrate diet.  Monitor blood glucose levels throughout the day before meals and at bedtime.  Record blood sugars and bring to outpatient providers appointment in order to be reviewed by your doctor for management modifications.  Be aware of diabetes management symptoms including feeling cool and clammy may be related to low glucose levels.  Feeling hot and dry may indicate high glucose levels. If you feel these symptoms, check your blood sugar.  Make regular podiatry appointments in order to have feet checked for wounds and toe nails cut by a doctor to prevent infections, as well as, appointments with an ophthalmologist to monitor your vision. PRINCIPAL DISCHARGE DIAGNOSIS  Diagnosis: Foot pain, bilateral  Assessment and Plan of Treatment: This is likely due to peripheral vascular disease. You were seen by Surgery last admission and recommended for conservative management. Continue ASA and Pletal. Follow up outpatient Vascular surgery in 1-2 weeks for further management.      SECONDARY DISCHARGE DIAGNOSES  Diagnosis: HLD (hyperlipidemia)  Assessment and Plan of Treatment: Continue cholesterol control medications. Continue DASH diet. Follow up with your PCP within 1 week of discharge for further management and monitoring of lipid and cholesterol panels.      Diagnosis: HTN (hypertension)  Assessment and Plan of Treatment: Continue blood pressure medication regimen as directed. Monitor for any visual changes, headaches or dizziness.  Monitor blood pressure regularly.  Follow up with your PCP for further management for high blood pressure.      Diagnosis: Herpes zoster  Assessment and Plan of Treatment: Continue valtrex as precribed to complete 7 day course    Diagnosis: Diabetes mellitus  Assessment and Plan of Treatment: Continue consistent carbohydrate diet.  Monitor blood glucose levels throughout the day before meals and at bedtime.  Record blood sugars and bring to outpatient providers appointment in order to be reviewed by your doctor for management modifications.  Be aware of diabetes management symptoms including feeling cool and clammy may be related to low glucose levels.  Feeling hot and dry may indicate high glucose levels. If you feel these symptoms, check your blood sugar.  Make regular podiatry appointments in order to have feet checked for wounds and toe nails cut by a doctor to prevent infections, as well as, appointments with an ophthalmologist to monitor your vision.

## 2019-05-07 NOTE — DISCHARGE NOTE PROVIDER - HOSPITAL COURSE
89 F PMHx of T2DM, PAD, HTN, HLD, recent admission for leg pain p/w leg pain. Pt seen by Surgery and deemed not a candidate for surgical intervention, recommend conservative management. PT recommends ------------        Discharge to ----------- 89 F PMHx of T2DM, PAD, HTN, HLD, recent admission for leg pain p/w leg pain. Pt seen by Surgery and deemed not a candidate for surgical intervention, recommend conservative management.    Course c/b herpes zoster located on L trunk/abdomen, rash dry and lesions crusted over, no oozing/weeping noted- ID recs appreciated, Valtrex x 7 days ordered.     Hypomagnesium- received IV supplementation    Mildly elevated K+- 5.4 on 5/9, received PO kayexelate         Discharge to rehabilitation facility. Patient medically stable for discharge per Dr. Fernando. 89 F PMHx of T2DM, PAD, HTN, HLD, recent admission for leg pain p/w leg pain. Pt seen by Surgery and deemed not a candidate for surgical intervention, recommend conservative management.    Course c/b herpes zoster located on L trunk/abdomen, rash dry and lesions crusted over, no oozing/weeping noted- ID recs appreciated, Valtrex x 7 days ordered.     Hypomagnesium- received IV supplementation, improved     Mildly elevated K+- 5.4 on 5/9, received PO kayexelate         Discharge to rehabilitation facility. Patient medically stable for discharge per Dr. Fernando.

## 2019-05-07 NOTE — DISCHARGE NOTE PROVIDER - CARE PROVIDERS DIRECT ADDRESSES
[email or web address]_Unknown

## 2019-05-08 DIAGNOSIS — B02.9 ZOSTER WITHOUT COMPLICATIONS: ICD-10-CM

## 2019-05-08 LAB
ANION GAP SERPL CALC-SCNC: 11 MMO/L — SIGNIFICANT CHANGE UP (ref 7–14)
BUN SERPL-MCNC: 20 MG/DL — SIGNIFICANT CHANGE UP (ref 7–23)
CALCIUM SERPL-MCNC: 8.8 MG/DL — SIGNIFICANT CHANGE UP (ref 8.4–10.5)
CHLORIDE SERPL-SCNC: 105 MMOL/L — SIGNIFICANT CHANGE UP (ref 98–107)
CO2 SERPL-SCNC: 21 MMOL/L — LOW (ref 22–31)
CREAT SERPL-MCNC: 1.62 MG/DL — HIGH (ref 0.5–1.3)
GLUCOSE BLDC GLUCOMTR-MCNC: 105 MG/DL — HIGH (ref 70–99)
GLUCOSE BLDC GLUCOMTR-MCNC: 105 MG/DL — HIGH (ref 70–99)
GLUCOSE BLDC GLUCOMTR-MCNC: 157 MG/DL — HIGH (ref 70–99)
GLUCOSE BLDC GLUCOMTR-MCNC: 159 MG/DL — HIGH (ref 70–99)
GLUCOSE SERPL-MCNC: 107 MG/DL — HIGH (ref 70–99)
HCT VFR BLD CALC: 29.5 % — LOW (ref 34.5–45)
HGB BLD-MCNC: 9.6 G/DL — LOW (ref 11.5–15.5)
MAGNESIUM SERPL-MCNC: 1 MG/DL — CRITICAL LOW (ref 1.6–2.6)
MCHC RBC-ENTMCNC: 29.2 PG — SIGNIFICANT CHANGE UP (ref 27–34)
MCHC RBC-ENTMCNC: 32.5 % — SIGNIFICANT CHANGE UP (ref 32–36)
MCV RBC AUTO: 89.7 FL — SIGNIFICANT CHANGE UP (ref 80–100)
NRBC # FLD: 0 K/UL — SIGNIFICANT CHANGE UP (ref 0–0)
PHOSPHATE SERPL-MCNC: 3 MG/DL — SIGNIFICANT CHANGE UP (ref 2.5–4.5)
PLATELET # BLD AUTO: 196 K/UL — SIGNIFICANT CHANGE UP (ref 150–400)
PMV BLD: 10.1 FL — SIGNIFICANT CHANGE UP (ref 7–13)
POTASSIUM SERPL-MCNC: 4.8 MMOL/L — SIGNIFICANT CHANGE UP (ref 3.5–5.3)
POTASSIUM SERPL-SCNC: 4.8 MMOL/L — SIGNIFICANT CHANGE UP (ref 3.5–5.3)
RBC # BLD: 3.29 M/UL — LOW (ref 3.8–5.2)
RBC # FLD: 16 % — HIGH (ref 10.3–14.5)
SODIUM SERPL-SCNC: 137 MMOL/L — SIGNIFICANT CHANGE UP (ref 135–145)
WBC # BLD: 3.86 K/UL — SIGNIFICANT CHANGE UP (ref 3.8–10.5)
WBC # FLD AUTO: 3.86 K/UL — SIGNIFICANT CHANGE UP (ref 3.8–10.5)

## 2019-05-08 PROCEDURE — 99222 1ST HOSP IP/OBS MODERATE 55: CPT

## 2019-05-08 RX ORDER — MAGNESIUM SULFATE 500 MG/ML
2 VIAL (ML) INJECTION ONCE
Qty: 0 | Refills: 0 | Status: COMPLETED | OUTPATIENT
Start: 2019-05-08 | End: 2019-05-08

## 2019-05-08 RX ORDER — SODIUM CHLORIDE 9 MG/ML
1000 INJECTION INTRAMUSCULAR; INTRAVENOUS; SUBCUTANEOUS
Qty: 0 | Refills: 0 | Status: DISCONTINUED | OUTPATIENT
Start: 2019-05-08 | End: 2019-05-09

## 2019-05-08 RX ORDER — VALACYCLOVIR 500 MG/1
1000 TABLET, FILM COATED ORAL EVERY 24 HOURS
Qty: 0 | Refills: 0 | Status: DISCONTINUED | OUTPATIENT
Start: 2019-05-08 | End: 2019-05-09

## 2019-05-08 RX ORDER — VALACYCLOVIR 500 MG/1
1000 TABLET, FILM COATED ORAL DAILY
Qty: 0 | Refills: 0 | Status: DISCONTINUED | OUTPATIENT
Start: 2019-05-08 | End: 2019-05-08

## 2019-05-08 RX ORDER — MAGNESIUM SULFATE 500 MG/ML
2 VIAL (ML) INJECTION ONCE
Qty: 0 | Refills: 0 | Status: DISCONTINUED | OUTPATIENT
Start: 2019-05-08 | End: 2019-05-08

## 2019-05-08 RX ADMIN — TRAMADOL HYDROCHLORIDE 25 MILLIGRAM(S): 50 TABLET ORAL at 05:20

## 2019-05-08 RX ADMIN — Medication 5 UNIT(S): at 09:54

## 2019-05-08 RX ADMIN — Medication 2: at 12:24

## 2019-05-08 RX ADMIN — LISINOPRIL 5 MILLIGRAM(S): 2.5 TABLET ORAL at 04:43

## 2019-05-08 RX ADMIN — VALACYCLOVIR 1000 MILLIGRAM(S): 500 TABLET, FILM COATED ORAL at 17:38

## 2019-05-08 RX ADMIN — TRAMADOL HYDROCHLORIDE 25 MILLIGRAM(S): 50 TABLET ORAL at 04:42

## 2019-05-08 RX ADMIN — Medication 50 GRAM(S): at 08:53

## 2019-05-08 RX ADMIN — Medication 5 UNIT(S): at 21:38

## 2019-05-08 RX ADMIN — Medication 81 MILLIGRAM(S): at 12:24

## 2019-05-08 RX ADMIN — Medication 25 MILLIGRAM(S): at 04:43

## 2019-05-08 RX ADMIN — TRAMADOL HYDROCHLORIDE 25 MILLIGRAM(S): 50 TABLET ORAL at 22:00

## 2019-05-08 RX ADMIN — TRAMADOL HYDROCHLORIDE 25 MILLIGRAM(S): 50 TABLET ORAL at 13:00

## 2019-05-08 RX ADMIN — TRAMADOL HYDROCHLORIDE 25 MILLIGRAM(S): 50 TABLET ORAL at 21:07

## 2019-05-08 RX ADMIN — CILOSTAZOL 100 MILLIGRAM(S): 100 TABLET ORAL at 04:42

## 2019-05-08 RX ADMIN — GABAPENTIN 100 MILLIGRAM(S): 400 CAPSULE ORAL at 12:24

## 2019-05-08 RX ADMIN — Medication 2: at 17:37

## 2019-05-08 RX ADMIN — SIMVASTATIN 20 MILLIGRAM(S): 20 TABLET, FILM COATED ORAL at 21:09

## 2019-05-08 RX ADMIN — SODIUM CHLORIDE 50 MILLILITER(S): 9 INJECTION INTRAMUSCULAR; INTRAVENOUS; SUBCUTANEOUS at 17:37

## 2019-05-08 RX ADMIN — TRAMADOL HYDROCHLORIDE 25 MILLIGRAM(S): 50 TABLET ORAL at 12:23

## 2019-05-08 RX ADMIN — CILOSTAZOL 100 MILLIGRAM(S): 100 TABLET ORAL at 17:38

## 2019-05-08 NOTE — PROVIDER CONTACT NOTE (OTHER) - ASSESSMENT
Lesions are bumps around left flank. Appears to be red, may be caused because of itching. Patient states they are new.

## 2019-05-08 NOTE — CHART NOTE - NSCHARTNOTEFT_GEN_A_CORE
Patient c/o of rash on left flank and back. Examined patient at bedside. Erythematous maculopapular rash on left flank extending to mid back, does not cross dermatomes. Patient reports pain in the affected region. No drainage or warmth noted. Mild tenderness upon palpation of region. Likely Herpes Zoster. Discussed with medical attending. Discussed with pharmacist and will dose Valtrex 1 gram every 24 hours based on patient's creatinine clearance. Team to continue to follow.

## 2019-05-08 NOTE — PROVIDER CONTACT NOTE (MEDICATION) - ASSESSMENT
Tramadol due at 1242, q 8 hours. patient in carrie 10/10 bilateral legs. patient requesting for pain medicine

## 2019-05-08 NOTE — PROGRESS NOTE ADULT - ASSESSMENT
89yo F with a PMHx of T2DM, PAD, HTN, HLD w/ a recent admission for leg pain found to have severe PAD and not a candidate for surgery coming in for leg pain. She lives in the basement of a house and was having too much pain when trying to walk up the stairs to get home. Admitted for MIGUEL ÁNGEL now found to have left flank zoster

## 2019-05-08 NOTE — PROVIDER CONTACT NOTE (MEDICATION) - ACTION/TREATMENT ORDERED:
Ordered to hold Chantell.
Will put provider to rn order that it is okay to provide pain medication early

## 2019-05-08 NOTE — CONSULT NOTE ADULT - ASSESSMENT
89F hypertension, CKD, DM PAD with foot pain.  No gangrene noted.  Found to have dermatomal zoster without superinfection.  CKD    Suggest:  - lesion PCR - sent  - valtrex 1g daily for one week

## 2019-05-08 NOTE — PROGRESS NOTE ADULT - SUBJECTIVE AND OBJECTIVE BOX
Patient is a 89y old  Female who presents with a chief complaint of pain foot toe (08 May 2019 15:07)      SUBJECTIVE / OVERNIGHT EVENTS: no overnight events    ROS:  Resp: No cough no sputum production  CVS: No chest pain no palpitations no orthopnea  GI: no N/V/D  : no dysuria, no hematuria  Neuro: no weakness no paresthesias  Heme: No petechiae no easy bruising  Msk: No joint pain no swelling  Skin: No rash no itching        MEDICATIONS  (STANDING):  aspirin  chewable 81 milliGRAM(s) Oral daily  cilostazol 100 milliGRAM(s) Oral two times a day  dextrose 5%. 1000 milliLiter(s) (50 mL/Hr) IV Continuous <Continuous>  dextrose 50% Injectable 12.5 Gram(s) IV Push once  dextrose 50% Injectable 25 Gram(s) IV Push once  dextrose 50% Injectable 25 Gram(s) IV Push once  gabapentin 100 milliGRAM(s) Oral daily  insulin detemir injectable (LEVEMIR) 5 Unit(s) SubCutaneous two times a day  insulin lispro (HumaLOG) corrective regimen sliding scale   SubCutaneous three times a day before meals  insulin lispro (HumaLOG) corrective regimen sliding scale   SubCutaneous at bedtime  lisinopril 5 milliGRAM(s) Oral daily  metoprolol tartrate 25 milliGRAM(s) Oral daily  simvastatin 20 milliGRAM(s) Oral at bedtime  sodium chloride 0.9%. 1000 milliLiter(s) (50 mL/Hr) IV Continuous <Continuous>  valACYclovir 1000 milliGRAM(s) Oral daily    MEDICATIONS  (PRN):  acetaminophen   Tablet .. 650 milliGRAM(s) Oral every 6 hours PRN Temp greater or equal to 38.5C (101.3F), Moderate Pain (4 - 6)  dextrose 40% Gel 15 Gram(s) Oral once PRN Blood Glucose LESS THAN 70 milliGRAM(s)/deciliter  glucagon  Injectable 1 milliGRAM(s) IntraMuscular once PRN Glucose LESS THAN 70 milligrams/deciliter  traMADol 25 milliGRAM(s) Oral every 8 hours PRN Moderate to Severe Pain        CAPILLARY BLOOD GLUCOSE      POCT Blood Glucose.: 157 mg/dL (08 May 2019 12:17)  POCT Blood Glucose.: 105 mg/dL (08 May 2019 08:42)  POCT Blood Glucose.: 171 mg/dL (07 May 2019 21:30)  POCT Blood Glucose.: 190 mg/dL (07 May 2019 17:30)    I&O's Summary      Vital Signs Last 24 Hrs  T(C): 36.5 (08 May 2019 12:25), Max: 36.7 (07 May 2019 22:10)  T(F): 97.7 (08 May 2019 12:25), Max: 98 (07 May 2019 22:10)  HR: 90 (08 May 2019 12:25) (68 - 90)  BP: 152/53 (08 May 2019 12:25) (113/53 - 152/53)  BP(mean): --  RR: 17 (08 May 2019 12:25) (17 - 18)  SpO2: 99% (08 May 2019 12:25) (98% - 100%)    PHYSICAL EXAM:  GENERAL: NAD, well-developed  HEAD:  Atraumatic, Normocephalic  EYES: EOMI, PERRLA, conjunctiva and sclera clear  NECK: Supple, No JVD  CHEST/LUNG: no rhonchi, no wheeze, clear to auscultation bilaterally  HEART: S1 S2; No rubs or gallops, no murmurs appreciated  ABDOMEN: Soft, Nontender, Nondistended; Bowel sounds present  EXTREMITIES:  No clubbing or cyanosis, + Peripheral Pulses,  no edema  PSYCH: AO x 3 appropriate affect  NEUROLOGY: non-focal, motor and sensory systems intact  SKIN: No rashes or lesions    LABS:                        9.6    3.86  )-----------( 196      ( 08 May 2019 06:32 )             29.5     05-08    137  |  105  |  20  ----------------------------<  107<H>  4.8   |  21<L>  |  1.62<H>    Ca    8.8      08 May 2019 06:32  Phos  3.0     05-08  Mg     1.0     05-08                  All consultant(s) notes reviewed and care discussed with other providers        Contact Number, Dr Fernando [phone] Patient is a 89y old  Female who presents with a chief complaint of pain foot toe (08 May 2019 15:07)    SUBJECTIVE / OVERNIGHT EVENTS: no overnight events  c/o left flank blisters    ROS:  Resp: No cough no sputum production  CVS: No chest pain no palpitations no orthopnea  GI: no N/V/D  : no dysuria, no hematuria  Neuro: no weakness no paresthesias  Heme: No petechiae no easy bruising  Msk: No joint pain no swelling  Skin: + left flank  rash no itching        MEDICATIONS  (STANDING):  aspirin  chewable 81 milliGRAM(s) Oral daily  cilostazol 100 milliGRAM(s) Oral two times a day  dextrose 5%. 1000 milliLiter(s) (50 mL/Hr) IV Continuous <Continuous>  dextrose 50% Injectable 12.5 Gram(s) IV Push once  dextrose 50% Injectable 25 Gram(s) IV Push once  dextrose 50% Injectable 25 Gram(s) IV Push once  gabapentin 100 milliGRAM(s) Oral daily  insulin detemir injectable (LEVEMIR) 5 Unit(s) SubCutaneous two times a day  insulin lispro (HumaLOG) corrective regimen sliding scale   SubCutaneous three times a day before meals  insulin lispro (HumaLOG) corrective regimen sliding scale   SubCutaneous at bedtime  lisinopril 5 milliGRAM(s) Oral daily  metoprolol tartrate 25 milliGRAM(s) Oral daily  simvastatin 20 milliGRAM(s) Oral at bedtime  sodium chloride 0.9%. 1000 milliLiter(s) (50 mL/Hr) IV Continuous <Continuous>  valACYclovir 1000 milliGRAM(s) Oral daily    MEDICATIONS  (PRN):  acetaminophen   Tablet .. 650 milliGRAM(s) Oral every 6 hours PRN Temp greater or equal to 38.5C (101.3F), Moderate Pain (4 - 6)  dextrose 40% Gel 15 Gram(s) Oral once PRN Blood Glucose LESS THAN 70 milliGRAM(s)/deciliter  glucagon  Injectable 1 milliGRAM(s) IntraMuscular once PRN Glucose LESS THAN 70 milligrams/deciliter  traMADol 25 milliGRAM(s) Oral every 8 hours PRN Moderate to Severe Pain        CAPILLARY BLOOD GLUCOSE      POCT Blood Glucose.: 157 mg/dL (08 May 2019 12:17)  POCT Blood Glucose.: 105 mg/dL (08 May 2019 08:42)  POCT Blood Glucose.: 171 mg/dL (07 May 2019 21:30)  POCT Blood Glucose.: 190 mg/dL (07 May 2019 17:30)    I&O's Summary      Vital Signs Last 24 Hrs  T(C): 36.5 (08 May 2019 12:25), Max: 36.7 (07 May 2019 22:10)  T(F): 97.7 (08 May 2019 12:25), Max: 98 (07 May 2019 22:10)  HR: 90 (08 May 2019 12:25) (68 - 90)  BP: 152/53 (08 May 2019 12:25) (113/53 - 152/53)  BP(mean): --  RR: 17 (08 May 2019 12:25) (17 - 18)  SpO2: 99% (08 May 2019 12:25) (98% - 100%)    PHYSICAL EXAM:  GENERAL: NAD,   HEAD:  Atraumatic, Normocephalic  EYES: EOMI, PERRLA, conjunctiva and sclera clear  NECK: Supple, No JVD  CHEST/LUNG: no rhonchi, no wheeze, clear to auscultation bilaterally  HEART: S1 S2; soft ejection systolic murmur best heard at left sternal border No rubs or gallops   ABDOMEN: Soft, Nontender, Nondistended; Bowel sounds present  EXTREMITIES:  No clubbing or cyanosis, no edema  PSYCH: AO x 3 appropriate affect [place] bilaterally   NEUROLOGY: non-focal, motor and sensory systems intact  SKIN: left flank dermatomal distribution of zoster lesions    LABS:                        9.6    3.86  )-----------( 196      ( 08 May 2019 06:32 )             29.5     05-08    137  |  105  |  20  ----------------------------<  107<H>  4.8   |  21<L>  |  1.62<H>    Ca    8.8      08 May 2019 06:32  Phos  3.0     05-08  Mg     1.0     05-08                  All consultant(s) notes reviewed and care discussed with other providers        Contact Number, Dr Fernando [phone]

## 2019-05-08 NOTE — CONSULT NOTE ADULT - SUBJECTIVE AND OBJECTIVE BOX
Kyra Bennett - [phone]    Patient is a 89y old  Female who presents with a chief complaint of pain foot toe (07 May 2019 16:22)    HPI:  89F with T2DM, PAD, HTN, CKD, HLD w/ a recent admission for leg pain found to have severe PAD and not a candidate for surgery coming in for leg pain.  Was brought here due to the pain.  No fever/chills.  She lives alone and has a part-time aide.  While here, noted to have a dermatomal rash suggestive of zoster.  She does have some pain where the rash is but the pain in her left foot is worse.  No pruritis.  She states that she has had zoster in the past.  Rest of ROS negative.    prior hospital charts reviewed [ x ]  primary team notes reviewed [ x ]  other consultant notes reviewed [x  ]    PAST MEDICAL & SURGICAL HISTORY:  Stage III chronic kidney disease  OA (osteoarthritis)  DM (diabetes mellitus)  HLD (hyperlipidemia)  HTN (hypertension)  H/O eye surgery: L &quot;retina&quot; surgery  H/O bilateral cataract extraction  S/P appendectomy: w/ ileocolic resection    Allergies  No Known Allergies    ANTIMICROBIALS:    valACYclovir 1000 daily    MEDICATIONS  (STANDING):  aspirin  chewable 81 daily  cilostazol 100 two times a day  gabapentin 100 daily  insulin detemir injectable (LEVEMIR) 5 two times a day  insulin lispro (HumaLOG) corrective regimen sliding scale  three times a day before meals  insulin lispro (HumaLOG) corrective regimen sliding scale  at bedtime  lisinopril 5 daily  metoprolol tartrate 25 daily  simvastatin 20 at bedtime    SOCIAL HISTORY:   non-smoker; has a son; lives alone; retired     FAMILY HISTORY:  Family history of diabetes mellitus (Child): in son and daughter    REVIEW OF SYSTEMS  [  ] ROS unobtainable because:    [ x ] All other systems negative except as noted below:	    Constitutional:  [ ] fever [ ] chills  [ ] weight loss  [ ] weakness  Skin:  [x ] rash [ ] phlebitis	  Eyes: [ ] icterus [ ] pain  [ ] discharge	  ENMT: [ ] sore throat  [ ] thrush [ ] ulcers [ ] exudates  Respiratory: [ ] dyspnea [ ] hemoptysis [ ] cough [ ] sputum	  Cardiovascular:  [ ] chest pain [ ] palpitations [ ] edema	  Gastrointestinal:  [ ] nausea [ ] vomiting [ ] diarrhea [ ] constipation [ ] pain	  Genitourinary:  [ ] dysuria [ ] frequency [ ] hematuria [ ] discharge [ ] flank pain  [ ] incontinence  Musculoskeletal:  [ ] myalgias [ ] arthralgias [ ] arthritis  [ x] feet pain  Neurological:  [ ] headache [ ] seizures  [ ] confusion/altered mental status  Psychiatric:  [ ] anxiety [ ] depression	  Hematology/Lymphatics:  [ ] lymphadenopathy  Endocrine:  [ ] adrenal [ ] thyroid  Allergic/Immunologic:	 [ ] transplant [ ] seasonal    Vital Signs Last 24 Hrs  T(F): 97.7 (05-08-19 @ 12:25), Max: 98 (05-06-19 @ 13:58)    Vital Signs Last 24 Hrs  HR: 90 (05-08-19 @ 12:25) (68 - 90)  BP: 152/53 (05-08-19 @ 12:25) (113/53 - 152/53)  RR: 17 (05-08-19 @ 12:25)  SpO2: 99% (05-08-19 @ 12:25) (98% - 100%)  Wt(kg): --    PHYSICAL EXAM:  General: non-toxic  HEAD/EYES: anicteric  ENT:  supple; poor dentition  Cardiovascular:   S1, S2  Respiratory:  clear bilaterally  GI:  soft, non-tender, normal bowel sounds  :  no lorenzana  Musculoskeletal:  no synovitis  Neurologic:  grossly non-focal  Skin:  left back/dermatomal vesicular rash - no superimposed cellulitis; cool feet/toes but no gangrene  Psychiatric:  appropriate affect  Vascular:  no phlebitis                       9.6    3.86  )-----------( 196      ( 08 May 2019 06:32 )             29.5     137  |  105  |  20  ----------------------------<  107<H>  4.8   |  21<L>  |  1.62<H>    Ca    8.8      08 May 2019 06:32  Phos  3.0     05-08  Mg     1.0     05-08    MICROBIOLOGY:  lesion PCR for VZV to be sent    RADIOLOGY:  none available

## 2019-05-08 NOTE — PROVIDER CONTACT NOTE (MEDICATION) - SITUATION
Pt is diabetic.  .  No humolog required.  Pt ordered for 5 Units Levimer.  Notified ADS pt not eating.
Patient is complaining of pain, pain medication not due

## 2019-05-09 ENCOUNTER — TRANSCRIPTION ENCOUNTER (OUTPATIENT)
Age: 84
End: 2019-05-09

## 2019-05-09 VITALS
DIASTOLIC BLOOD PRESSURE: 68 MMHG | RESPIRATION RATE: 18 BRPM | HEART RATE: 86 BPM | OXYGEN SATURATION: 99 % | TEMPERATURE: 98 F | SYSTOLIC BLOOD PRESSURE: 145 MMHG

## 2019-05-09 LAB
ANION GAP SERPL CALC-SCNC: 10 MMO/L — SIGNIFICANT CHANGE UP (ref 7–14)
ANION GAP SERPL CALC-SCNC: 9 MMO/L — SIGNIFICANT CHANGE UP (ref 7–14)
BUN SERPL-MCNC: 16 MG/DL — SIGNIFICANT CHANGE UP (ref 7–23)
BUN SERPL-MCNC: 17 MG/DL — SIGNIFICANT CHANGE UP (ref 7–23)
CALCIUM SERPL-MCNC: 8.6 MG/DL — SIGNIFICANT CHANGE UP (ref 8.4–10.5)
CALCIUM SERPL-MCNC: 9 MG/DL — SIGNIFICANT CHANGE UP (ref 8.4–10.5)
CHLORIDE SERPL-SCNC: 104 MMOL/L — SIGNIFICANT CHANGE UP (ref 98–107)
CHLORIDE SERPL-SCNC: 108 MMOL/L — HIGH (ref 98–107)
CO2 SERPL-SCNC: 22 MMOL/L — SIGNIFICANT CHANGE UP (ref 22–31)
CO2 SERPL-SCNC: 22 MMOL/L — SIGNIFICANT CHANGE UP (ref 22–31)
CREAT SERPL-MCNC: 1.42 MG/DL — HIGH (ref 0.5–1.3)
CREAT SERPL-MCNC: 1.48 MG/DL — HIGH (ref 0.5–1.3)
GLUCOSE BLDC GLUCOMTR-MCNC: 129 MG/DL — HIGH (ref 70–99)
GLUCOSE BLDC GLUCOMTR-MCNC: 174 MG/DL — HIGH (ref 70–99)
GLUCOSE BLDC GLUCOMTR-MCNC: 76 MG/DL — SIGNIFICANT CHANGE UP (ref 70–99)
GLUCOSE SERPL-MCNC: 133 MG/DL — HIGH (ref 70–99)
GLUCOSE SERPL-MCNC: 190 MG/DL — HIGH (ref 70–99)
HCT VFR BLD CALC: 29.1 % — LOW (ref 34.5–45)
HGB BLD-MCNC: 9.4 G/DL — LOW (ref 11.5–15.5)
HSV+VZV DNA SPEC QL NAA+PROBE: SIGNIFICANT CHANGE UP
MAGNESIUM SERPL-MCNC: 1.3 MG/DL — LOW (ref 1.6–2.6)
MAGNESIUM SERPL-MCNC: 1.9 MG/DL — SIGNIFICANT CHANGE UP (ref 1.6–2.6)
MCHC RBC-ENTMCNC: 29.8 PG — SIGNIFICANT CHANGE UP (ref 27–34)
MCHC RBC-ENTMCNC: 32.3 % — SIGNIFICANT CHANGE UP (ref 32–36)
MCV RBC AUTO: 92.4 FL — SIGNIFICANT CHANGE UP (ref 80–100)
NRBC # FLD: 0 K/UL — SIGNIFICANT CHANGE UP (ref 0–0)
PHOSPHATE SERPL-MCNC: 3.2 MG/DL — SIGNIFICANT CHANGE UP (ref 2.5–4.5)
PLATELET # BLD AUTO: 178 K/UL — SIGNIFICANT CHANGE UP (ref 150–400)
PMV BLD: 10 FL — SIGNIFICANT CHANGE UP (ref 7–13)
POTASSIUM SERPL-MCNC: 5.1 MMOL/L — SIGNIFICANT CHANGE UP (ref 3.5–5.3)
POTASSIUM SERPL-MCNC: 5.4 MMOL/L — HIGH (ref 3.5–5.3)
POTASSIUM SERPL-SCNC: 5.1 MMOL/L — SIGNIFICANT CHANGE UP (ref 3.5–5.3)
POTASSIUM SERPL-SCNC: 5.4 MMOL/L — HIGH (ref 3.5–5.3)
RBC # BLD: 3.15 M/UL — LOW (ref 3.8–5.2)
RBC # FLD: 16.4 % — HIGH (ref 10.3–14.5)
SODIUM SERPL-SCNC: 136 MMOL/L — SIGNIFICANT CHANGE UP (ref 135–145)
SODIUM SERPL-SCNC: 139 MMOL/L — SIGNIFICANT CHANGE UP (ref 135–145)
SPECIMEN SOURCE: SIGNIFICANT CHANGE UP
WBC # BLD: 3.5 K/UL — LOW (ref 3.8–10.5)
WBC # FLD AUTO: 3.5 K/UL — LOW (ref 3.8–10.5)

## 2019-05-09 PROCEDURE — 99232 SBSQ HOSP IP/OBS MODERATE 35: CPT

## 2019-05-09 RX ORDER — TRAMADOL HYDROCHLORIDE 50 MG/1
0.5 TABLET ORAL
Qty: 0 | Refills: 0 | DISCHARGE
Start: 2019-05-09

## 2019-05-09 RX ORDER — BENAZEPRIL HYDROCHLORIDE 40 MG/1
1 TABLET ORAL
Qty: 0 | Refills: 0 | DISCHARGE

## 2019-05-09 RX ORDER — VALACYCLOVIR 500 MG/1
1 TABLET, FILM COATED ORAL
Qty: 0 | Refills: 0 | DISCHARGE
Start: 2019-05-09

## 2019-05-09 RX ORDER — CILOSTAZOL 100 MG/1
1 TABLET ORAL
Qty: 0 | Refills: 0 | DISCHARGE
Start: 2019-05-09

## 2019-05-09 RX ORDER — METOPROLOL TARTRATE 50 MG
1 TABLET ORAL
Qty: 0 | Refills: 0 | DISCHARGE
Start: 2019-05-09

## 2019-05-09 RX ORDER — TRAMADOL HYDROCHLORIDE 50 MG/1
25 TABLET ORAL EVERY 8 HOURS
Refills: 0 | Status: DISCONTINUED | OUTPATIENT
Start: 2019-05-09 | End: 2019-05-09

## 2019-05-09 RX ORDER — INSULIN DETEMIR 100/ML (3)
5 INSULIN PEN (ML) SUBCUTANEOUS
Qty: 0 | Refills: 0 | DISCHARGE
Start: 2019-05-09

## 2019-05-09 RX ORDER — ACETAMINOPHEN 500 MG
2 TABLET ORAL
Qty: 0 | Refills: 0 | DISCHARGE
Start: 2019-05-09

## 2019-05-09 RX ORDER — SODIUM POLYSTYRENE SULFONATE 4.1 MEQ/G
30 POWDER, FOR SUSPENSION ORAL ONCE
Refills: 0 | Status: COMPLETED | OUTPATIENT
Start: 2019-05-09 | End: 2019-05-09

## 2019-05-09 RX ORDER — ASPIRIN/CALCIUM CARB/MAGNESIUM 324 MG
1 TABLET ORAL
Qty: 0 | Refills: 0 | DISCHARGE
Start: 2019-05-09

## 2019-05-09 RX ORDER — PIOGLITAZONE HYDROCHLORIDE 15 MG/1
1 TABLET ORAL
Qty: 0 | Refills: 0 | DISCHARGE

## 2019-05-09 RX ORDER — INSULIN DETEMIR 100/ML (3)
0 INSULIN PEN (ML) SUBCUTANEOUS
Qty: 0 | Refills: 0 | DISCHARGE

## 2019-05-09 RX ORDER — MAGNESIUM SULFATE 500 MG/ML
2 VIAL (ML) INJECTION ONCE
Refills: 0 | Status: COMPLETED | OUTPATIENT
Start: 2019-05-09 | End: 2019-05-09

## 2019-05-09 RX ADMIN — LISINOPRIL 5 MILLIGRAM(S): 2.5 TABLET ORAL at 05:28

## 2019-05-09 RX ADMIN — SODIUM POLYSTYRENE SULFONATE 30 GRAM(S): 4.1 POWDER, FOR SUSPENSION ORAL at 14:43

## 2019-05-09 RX ADMIN — VALACYCLOVIR 1000 MILLIGRAM(S): 500 TABLET, FILM COATED ORAL at 17:49

## 2019-05-09 RX ADMIN — CILOSTAZOL 100 MILLIGRAM(S): 100 TABLET ORAL at 05:27

## 2019-05-09 RX ADMIN — Medication 2: at 17:49

## 2019-05-09 RX ADMIN — TRAMADOL HYDROCHLORIDE 25 MILLIGRAM(S): 50 TABLET ORAL at 12:30

## 2019-05-09 RX ADMIN — Medication 650 MILLIGRAM(S): at 06:00

## 2019-05-09 RX ADMIN — Medication 25 MILLIGRAM(S): at 05:28

## 2019-05-09 RX ADMIN — TRAMADOL HYDROCHLORIDE 25 MILLIGRAM(S): 50 TABLET ORAL at 11:48

## 2019-05-09 RX ADMIN — Medication 5 UNIT(S): at 12:47

## 2019-05-09 RX ADMIN — Medication 81 MILLIGRAM(S): at 11:32

## 2019-05-09 RX ADMIN — CILOSTAZOL 100 MILLIGRAM(S): 100 TABLET ORAL at 17:49

## 2019-05-09 RX ADMIN — GABAPENTIN 100 MILLIGRAM(S): 400 CAPSULE ORAL at 11:32

## 2019-05-09 RX ADMIN — Medication 650 MILLIGRAM(S): at 05:26

## 2019-05-09 RX ADMIN — Medication 50 GRAM(S): at 12:47

## 2019-05-09 NOTE — PROGRESS NOTE ADULT - SUBJECTIVE AND OBJECTIVE BOX
Patient is a 89y old  Female who presents with a chief complaint of pain foot toe (07 May 2019 16:22)    f/u rash    Interval History/ROS:  no fever.  still with foot pain.  tolerating valtrex.  no headache.  no n/v.  rash is not painful today.  Remainder of ROS otherwise negative.    PAST MEDICAL & SURGICAL HISTORY:  Stage III chronic kidney disease  OA (osteoarthritis)  DM (diabetes mellitus)  HLD (hyperlipidemia)  HTN (hypertension)  H/O eye surgery: L &quot;retina&quot; surgery  H/O bilateral cataract extraction  S/P appendectomy: w/ ileocolic resection    Allergies  No Known Allergies    ANTIMICROBIALS:    valACYclovir 1000 daily (5/8-)    MEDICATIONS  (STANDING):  aspirin  chewable 81 daily  cilostazol 100 two times a day  gabapentin 100 daily  insulin detemir injectable (LEVEMIR) 5 two times a day  insulin lispro (HumaLOG) corrective regimen sliding scale  three times a day before meals  insulin lispro (HumaLOG) corrective regimen sliding scale  at bedtime  lisinopril 5 daily  metoprolol tartrate 25 daily  simvastatin 20 at bedtime    Vital Signs Last 24 Hrs  T(F): 97.9 (05-09-19 @ 05:21), Max: 98.8 (05-08-19 @ 21:04)  HR: 80 (05-09-19 @ 05:21)  BP: 115/50 (05-09-19 @ 05:21)  RR: 18 (05-09-19 @ 05:21)  SpO2: 97% (05-09-19 @ 05:21) (97% - 99%)    seen earlier this morning  PHYSICAL EXAM:  General: non-toxic, lying in bed, tired this morning  HEAD/EYES: anicteric  ENT:  supple  Cardiovascular:   S1, S2  Respiratory:  clear bilaterally  GI:  soft, non-tender, normal bowel sounds  :  no lorenzana  Musculoskeletal:  no synovitis  Neurologic:  grossly non-focal  Skin:  left back/dermatomal vesicular rash - no cellulitis; no pus; not scabbing yet  Psychiatric:  appropriate affect  Vascular:  no phlebitis                        9.4    3.50  )-----------( 178      ( 09 May 2019 11:02 )             29.1 05-09    139  |  108  |  16  ----------------------------<  133  5.4   |  22  |  1.42  Ca    8.6      09 May 2019 11:02Phos  3.2     05-09Mg     1.3     05-09    MICROBIOLOGY:  lesion PCR for VZV sent yesterday - pcr is still pending    RADIOLOGY:  none available

## 2019-05-09 NOTE — PROGRESS NOTE ADULT - ASSESSMENT
89F hypertension, CKD, DM PAD with foot pain.  No gangrene noted.  Found to have dermatomal zoster without superinfection.  CKD.  Tolerating vatrex.  Awaiting scabbing    Suggest:  - f/u VZV PCR  - valtrex 1g daily for one week  - d/c planning    I will be away returning 5/136/19.   ID available.  Please call [phone]. 89F hypertension, CKD, DM PAD with foot pain.  No gangrene noted.  Found to have dermatomal zoster without superinfection.  CKD.  Tolerating vatrex.  Awaiting scabbing    Suggest:  - f/u VZV PCR  - valtrex 1g daily for one week  - d/c planning    I will be away returning 5/13/19.   ID available.  Please call [phone].

## 2019-05-09 NOTE — PROGRESS NOTE ADULT - ATTENDING COMMENTS
discharge to Subacute Rehab if repeat potassium normal
discussed with patient in detail, all questions answered
discussed with patient in detail, all questions answered

## 2019-05-09 NOTE — PROGRESS NOTE ADULT - PROBLEM SELECTOR PLAN 3
acceptable  will continue to monitor  continue home meds with hold parameters
continue Levemir  finger sticks with short acting insulin sliding scale
continue Levemir  finger sticks with short acting insulin sliding scale

## 2019-05-09 NOTE — PROGRESS NOTE ADULT - PROBLEM SELECTOR PLAN 2
continue Levemir  finger sticks with short acting insulin sliding scale
not in pain at this time  continue Pletal and Aspirin.
Likely sec to PVD  clearly not in pain at this time  Vascular consulted last admission advised conservative management  continue Pletal and Aspirin.

## 2019-05-09 NOTE — PROGRESS NOTE ADULT - PROBLEM SELECTOR PLAN 1
Likely sec to PVD  clearly not in pain at this time  Vascular consulted last admission advised conservative management  continue Pletal and Aspirin.
continue valacyclovir   will continue per ID x 7 days
start valtrex   ID help appreciated

## 2019-05-09 NOTE — PROGRESS NOTE ADULT - PROBLEM SELECTOR PROBLEM 5
CKD (chronic kidney disease) stage 3, GFR 30-59 ml/min

## 2019-05-09 NOTE — PROGRESS NOTE ADULT - SUBJECTIVE AND OBJECTIVE BOX
Patient is a 89y old  Female who presents with a chief complaint of pain foot toe (09 May 2019 11:46)      SUBJECTIVE / OVERNIGHT EVENTS: no overnight events  occasional pins and needles left flank    ROS:  Resp: No cough no sputum production  CVS: No chest pain no palpitations no orthopnea  GI: no N/V/D  : no dysuria, no hematuria  Neuro: no weakness no paresthesias  Heme: No petechiae no easy bruising  Msk: No joint pain no swelling  Skin: No rash no itching        MEDICATIONS  (STANDING):  aspirin  chewable 81 milliGRAM(s) Oral daily  cilostazol 100 milliGRAM(s) Oral two times a day  dextrose 5%. 1000 milliLiter(s) (50 mL/Hr) IV Continuous <Continuous>  dextrose 50% Injectable 12.5 Gram(s) IV Push once  dextrose 50% Injectable 25 Gram(s) IV Push once  dextrose 50% Injectable 25 Gram(s) IV Push once  gabapentin 100 milliGRAM(s) Oral daily  insulin detemir injectable (LEVEMIR) 5 Unit(s) SubCutaneous two times a day  insulin lispro (HumaLOG) corrective regimen sliding scale   SubCutaneous three times a day before meals  insulin lispro (HumaLOG) corrective regimen sliding scale   SubCutaneous at bedtime  lisinopril 5 milliGRAM(s) Oral daily  metoprolol tartrate 25 milliGRAM(s) Oral daily  simvastatin 20 milliGRAM(s) Oral at bedtime  sodium chloride 0.9%. 1000 milliLiter(s) (50 mL/Hr) IV Continuous <Continuous>  valACYclovir 1000 milliGRAM(s) Oral every 24 hours    MEDICATIONS  (PRN):  acetaminophen   Tablet .. 650 milliGRAM(s) Oral every 6 hours PRN Temp greater or equal to 38.5C (101.3F), Moderate Pain (4 - 6)  dextrose 40% Gel 15 Gram(s) Oral once PRN Blood Glucose LESS THAN 70 milliGRAM(s)/deciliter  glucagon  Injectable 1 milliGRAM(s) IntraMuscular once PRN Glucose LESS THAN 70 milligrams/deciliter  traMADol 25 milliGRAM(s) Oral every 8 hours PRN Moderate to Severe Pain        CAPILLARY BLOOD GLUCOSE      POCT Blood Glucose.: 129 mg/dL (09 May 2019 12:06)  POCT Blood Glucose.: 76 mg/dL (09 May 2019 08:18)  POCT Blood Glucose.: 105 mg/dL (08 May 2019 21:20)  POCT Blood Glucose.: 159 mg/dL (08 May 2019 17:29)    I&O's Summary      Vital Signs Last 24 Hrs  T(C): 36.6 (09 May 2019 12:15), Max: 37.1 (08 May 2019 21:04)  T(F): 97.9 (09 May 2019 12:15), Max: 98.8 (08 May 2019 21:04)  HR: 86 (09 May 2019 12:15) (80 - 86)  BP: 145/68 (09 May 2019 12:15) (114/50 - 145/68)  BP(mean): --  RR: 18 (09 May 2019 12:15) (17 - 18)  SpO2: 99% (09 May 2019 12:15) (97% - 99%)    PHYSICAL EXAM:  GENERAL: NAD,   HEAD:  Atraumatic, Normocephalic  EYES: EOMI, PERRLA, conjunctiva and sclera clear  NECK: Supple, No JVD  CHEST/LUNG: no rhonchi, no wheeze, clear to auscultation bilaterally  HEART: S1 S2; soft ejection systolic murmur best heard at left sternal border No rubs or gallops   ABDOMEN: Soft, Nontender, Nondistended; Bowel sounds present  EXTREMITIES:  No clubbing or cyanosis, no edema  PSYCH: AO x 3 appropriate affect [place] bilaterally   NEUROLOGY: non-focal, motor and sensory systems intact  SKIN: left flank dermatomal distribution of zoster lesions mild progression    LABS:                        9.4    3.50  )-----------( 178      ( 09 May 2019 11:02 )             29.1     05-09    139  |  108<H>  |  16  ----------------------------<  133<H>  5.4<H>   |  22  |  1.42<H>    Ca    8.6      09 May 2019 11:02  Phos  3.2     05-09  Mg     1.3     05-09                  All consultant(s) notes reviewed and care discussed with other providers        Contact Number, Dr Fernando [phone]

## 2019-05-09 NOTE — PROGRESS NOTE ADULT - PROBLEM SELECTOR PLAN 5
creatinine baseline  continue to monitor  drop in Hct dilutional  Hb at baseline
creatinine better  will continue to monitor as outpatient   mild hyperkalemia  likely secondary to CKD   s/p kayexelate
creatinine mild worsening  gentle IVF  continue to monitor  drop in Hct dilutional  Hb at baseline

## 2019-05-09 NOTE — DISCHARGE NOTE NURSING/CASE MANAGEMENT/SOCIAL WORK - NSDCDPATPORTLINK_GEN_ALL_CORE
You can access the [facility] Patient Portal, offered by [facility], by registering with the following website: [email or web address]

## 2019-06-10 NOTE — H&P ADULT - NSHPATTENDINGPLANDISCUSS_GEN_ALL_CORE
Continue current dose of warfarin as instructed on dosing calendar provided. Continue to monitor urine and stool for signs and symptoms of bleeding. Please notify the clinic of any medication changes. Please remember to bring all medications (both prescription and OTC) to your next visit. Kindly notify the clinic if you are unable to make to your next appointment.
pt

## 2019-11-04 ENCOUNTER — APPOINTMENT (OUTPATIENT)
Dept: NEUROLOGY | Facility: CLINIC | Age: 84
End: 2019-11-04
Payer: MEDICARE

## 2019-11-04 VITALS
HEIGHT: 66 IN | SYSTOLIC BLOOD PRESSURE: 185 MMHG | OXYGEN SATURATION: 93 % | WEIGHT: 163 LBS | HEART RATE: 81 BPM | DIASTOLIC BLOOD PRESSURE: 85 MMHG | BODY MASS INDEX: 26.2 KG/M2

## 2019-11-04 DIAGNOSIS — R20.0 ANESTHESIA OF SKIN: ICD-10-CM

## 2019-11-04 DIAGNOSIS — Z78.9 OTHER SPECIFIED HEALTH STATUS: ICD-10-CM

## 2019-11-04 DIAGNOSIS — Z83.3 FAMILY HISTORY OF DIABETES MELLITUS: ICD-10-CM

## 2019-11-04 DIAGNOSIS — Z86.39 PERSONAL HISTORY OF OTHER ENDOCRINE, NUTRITIONAL AND METABOLIC DISEASE: ICD-10-CM

## 2019-11-04 DIAGNOSIS — R20.2 ANESTHESIA OF SKIN: ICD-10-CM

## 2019-11-04 DIAGNOSIS — Z86.79 PERSONAL HISTORY OF OTHER DISEASES OF THE CIRCULATORY SYSTEM: ICD-10-CM

## 2019-11-04 PROCEDURE — 99205 OFFICE O/P NEW HI 60 MIN: CPT

## 2019-11-04 RX ORDER — INSULIN DETEMIR 100 [IU]/ML
100 INJECTION, SOLUTION SUBCUTANEOUS
Refills: 0 | Status: ACTIVE | COMMUNITY

## 2019-11-04 NOTE — ASSESSMENT
[FreeTextEntry1] : Pain in  the legs including burning pain concerning for peripheral neuropathy, likely due to DM.  Will get ncs/emg of the legs, basic neuropathy labs.  Will increase Neurontin to 200mg tid.  fu after tests above

## 2019-11-04 NOTE — REVIEW OF SYSTEMS
[As Noted in HPI] : as noted in HPI [Joint Pain] : joint pain [Fever] : no fever [Anxiety] : no anxiety [Eyesight Problems] : no eyesight problems [Loss Of Hearing] : no hearing loss [Chest Pain] : no chest pain [Shortness Of Breath] : no shortness of breath [Vomiting] : no vomiting [Incontinence] : no incontinence [Itching] : no itching [Muscle Weakness] : no muscle weakness [Easy Bleeding] : no tendency for easy bleeding

## 2019-11-04 NOTE — CONSULT LETTER
[Dear  ___] : Dear  [unfilled], [Consult Letter:] : I had the pleasure of evaluating your patient, [unfilled]. [Please see my note below.] : Please see my note below. [Consult Closing:] : Thank you very much for allowing me to participate in the care of this patient.  If you have any questions, please do not hesitate to contact me. [Sincerely,] : Sincerely, [FreeTextEntry3] : Sandy Alexander MD, MPH\par

## 2019-11-04 NOTE — PHYSICAL EXAM
[General Appearance - Alert] : alert [General Appearance - In No Acute Distress] : in no acute distress [Person] : oriented to person [Place] : oriented to place [Time] : oriented to time [Registration Intact] : recent registration memory intact [Concentration Intact] : normal concentrating ability [Visual Intact] : visual attention was ~T not ~L decreased [Naming Objects] : no difficulty naming common objects [Repeating Phrases] : no difficulty repeating a phrase [Fluency] : fluency intact [Comprehension] : comprehension intact [Cranial Nerves Optic (II)] : visual acuity intact bilaterally,  visual fields full to confrontation, pupils equal round and reactive to light [Cranial Nerves Oculomotor (III)] : extraocular motion intact [Cranial Nerves Trigeminal (V)] : facial sensation intact symmetrically [Cranial Nerves Facial (VII)] : face symmetrical [Cranial Nerves Glossopharyngeal (IX)] : tongue and palate midline [Cranial Nerves Accessory (XI - Cranial And Spinal)] : head turning and shoulder shrug symmetric [Cranial Nerves Hypoglossal (XII)] : there was no tongue deviation with protrusion [Motor Tone] : muscle tone was normal in all four extremities [Sensation Tactile Decrease] : light touch was intact [Limited Balance] : the patient's balance was impaired [1+] : Brachioradialis left 1+ [0] : Ankle jerk left 0 [Full Pulse] : the pedal pulses are present [Finger Rub Not [place]] : finger rub was heard [Coordination - Dysmetria Impaired Finger-to-Nose Bilateral] : not present [Coordination - Dysmetria Impaired Heel-to-Shin Bilateral] : not present [Plantar Reflex Right Only] : normal on the right [Plantar Reflex Left Only] : normal on the left [FreeTextEntry5] : fundi not visualized [FreeTextEntry6] : strength grossly full but paitent braun snot cooperate with formal strenght testing

## 2019-11-04 NOTE — HISTORY OF PRESENT ILLNESS
[FreeTextEntry1] : patient is poor historian\par \par The patient had h/o of Dm and is here for pain in her legs.  She feels that it is her whole legs, chronic.  Denies weakness but uses a walker to walk.  No back pain.  No symptoms in the hands.  Has been taking Neurontin 100mg bid without relief of her pain.

## 2020-01-13 ENCOUNTER — APPOINTMENT (OUTPATIENT)
Dept: NEUROLOGY | Facility: CLINIC | Age: 85
End: 2020-01-13
Payer: MEDICARE

## 2020-01-13 VITALS
DIASTOLIC BLOOD PRESSURE: 65 MMHG | HEART RATE: 74 BPM | WEIGHT: 166 LBS | SYSTOLIC BLOOD PRESSURE: 139 MMHG | HEIGHT: 66 IN | BODY MASS INDEX: 26.68 KG/M2

## 2020-01-13 PROCEDURE — 95886 MUSC TEST DONE W/N TEST COMP: CPT

## 2020-01-13 PROCEDURE — 95912 NRV CNDJ TEST 11-12 STUDIES: CPT

## 2020-02-24 ENCOUNTER — APPOINTMENT (OUTPATIENT)
Dept: NEUROLOGY | Facility: CLINIC | Age: 85
End: 2020-02-24

## 2020-04-05 NOTE — H&P ADULT - NEGATIVE NEUROLOGICAL SYMPTOMS
-- DO NOT REPLY / DO NOT REPLY ALL --  -- Message is from the [facility]--    COVID-19 Universal Screening: Positive    Transfer to RN      Chief Complaint:  Other breathing concern: shortness of breath/walking  Abdominal pain/discomfort    Other symptoms: Dizzy, cold symptoms    Caller Information       Type Contact Phone    04/05/2020 08:58 AM Phone (Incoming) Kelton Adams (Self) [phone] (U)          Provider Name:  Dr. Patel Practice Site Name:  [facility]    Alternative Phone Number:       no vertigo/no loss of sensation/no syncope/no loss of consciousness/no weakness/no headache/no confusion/no paresthesias

## 2020-05-08 NOTE — CONSULT NOTE ADULT - SUBJECTIVE AND OBJECTIVE BOX
HPI:  89 y/o female with a PMHx of arthritis, "kidney problems" (pt denies current or h/o HD, but cannot recall exact diagnosis), HTN, HLD and DM presents s/p unwitnessed fall yesterday around 18:00. Pt reports that she was attempting to walk to the restroom when she fell. Although pt participates in interview, history limited as pt states she "forgets things" and "cannot always remember things". Pt states that she did not land on her back and denies head injury, but admits that she is unsure which part of her body she landed on. She currently reports diffuse pain after falling that is worst in her R knee and L shoulder. Pt states that she was unable to stand up after the fall, but that she was able to move herself to a sitting position and move around on the floor. She notes that she remained on the floor until she was able to access her Med Alert button and contact EMS this morning, though she does not recall what time this was. Pt states that she is typically ambulatory with a walker at baseline, but that she does not always use it while she is at home. She denies urinary or fecal incontinence a/w the fall, but notes that she did urinate while she was stuck on the floor as she was unable to stand up to get to the restroom. Pt reports that she has fallen in the past, though she cannot recall the details about these episodes. Additionally, she reports that she has fainted in the past due to not eating as much as she should and states that she "didn't eat much" yesterday, but is unable to provide further details. Denies f/c, change in appetite from baseline, weight change, fatigue, generalized weakness, vision changes, eye pain, hearing changes, tinnitus, sore throat, nasal congestion, SOB, cough, orthopnea, ARNDT, palpitations, chest pain, LE edema, n/v/d, abd pain, changes in chronic constipation, hematochezia, urinary frequency, dysuria, hematuria, rash, headache, dizziness, numbness, tingling, extremity weakness, syncope/LOC or confusion. (29 Oct 2018 12:37)    Currently Denies CP, SOB, Dizziness, LOC, prior syncope.  Reports she falls often bc her legs give out.  She lives alone with an Aide.    PAST MEDICAL & SURGICAL HISTORY:  OA (osteoarthritis)  DM (diabetes mellitus)  HLD (hyperlipidemia)  HTN (hypertension)  H/O eye surgery: L &quot;retina&quot; surgery  H/O bilateral cataract extraction  S/P appendectomy: w/ ileocolic resection    MEDICATIONS  (STANDING):  aspirin enteric coated 81 milliGRAM(s) Oral daily  atorvastatin 10 milliGRAM(s) Oral at bedtime  calcium carbonate 1250 mG  + Vitamin D (OsCal 500 + D) 1 Tablet(s) Oral two times a day  enoxaparin Injectable 40 milliGRAM(s) SubCutaneous every 24 hours  insulin glargine Injectable (LANTUS) 10 Unit(s) SubCutaneous every morning  insulin glargine Injectable (LANTUS) 10 Unit(s) SubCutaneous at bedtime  insulin lispro (HumaLOG) corrective regimen sliding scale   SubCutaneous three times a day before meals  insulin lispro (HumaLOG) corrective regimen sliding scale   SubCutaneous at bedtime  lisinopril 5 milliGRAM(s) Oral daily  magnesium oxide 400 milliGRAM(s) Oral three times a day with meals  metoprolol tartrate 25 milliGRAM(s) Oral daily      Allergies  No Known Allergies    FAMILY HISTORY:  Family history of diabetes mellitus (Child)  Noncontributory for premature coronary disease or sudden cardiac death    SOCIAL HISTORY:    [x ] Non-smoker  [ ] Smoker  [ ] Alcohol      REVIEW OF SYSTEMS:  [ ]chest pain  [  ]shortness of breath  [  ]palpitations  [ ]syncope  [ ]near syncope [ ]upper extremity weakness   [x ] lower extremity weakness  [  ]diplopia  [  ]altered mental status   [  ]fevers  [ ]chills [ ]nausea  [ ]vomitting  [  ]dysphagia    [ ]abdominal pain  [ ]melena  [ ]BRBPR    [  ]epistaxis  [  ]rash  [ ]lower extremity edema      [ x] All others negative	  [ ] Unable to obtain    PHYSICAL EXAM:  T(C): 36.9 (10-30-18 @ 10:32), Max: 37.2 (10-29-18 @ 17:56)  HR: 86 (10-30-18 @ 05:01) (80 - 86)  BP: 140/55 (10-30-18 @ 05:01) (107/57 - 140/55)  RR: 18 (10-30-18 @ 10:32) (16 - 18)  SpO2: 100% (10-30-18 @ 10:32) (99% - 100%)    Appearance: Normal	  HEENT:   Normal oral mucosa, PERRL, EOMI	  Lymphatic: No lymphadenopathy , no edema  Cardiovascular: Normal S1 S2, No JVD, No murmurs , Peripheral pulses palpable 2+ bilaterally  Respiratory: Lungs clear to auscultation, normal effort 	  Gastrointestinal:  Soft, Non-tender, + BS	  Skin: No rashes, No ecchymoses, No cyanosis, warm to touch    DIAGNOSTIC DATA:    EKG: SR, First degree AV block, QRS 76 ms    < from: Transthoracic Echocardiogram (10.19.16 @ 10:03) >  CONCLUSIONS:  1. Mitral annular calcification, otherwise normal mitral  valve. Minimal mitral regurgitation.  2. Increased relative wall thickness with normal left  ventricular mass index, consistent with concentric left  ventricular remodeling.  3. Normal left ventricular systolic function. No segmental  wall motion abnormalities.  4. Mild diastolic dysfunction (Stage I).  5. Normal right ventricular size and systolic function.  ------------------------------------------------------------------------  Confirmed on  10/19/2016 - 11:32:32 by Zane Garcia M.D.    < end of copied text >    < from: Nuclear Stress Test-Pharmacologic (03.07.17 @ 10:30) >  IMPRESSIONS:  * No ECG evidence of ischemia  * Myocardial Perfusion SPECT results are abnormal.  * There is a medium sized, mild defect in anterior wall  that is partially reversible, suggestive of mild ischemia.  * The left ventricle was normal LV size.  * Post-stress resting myocardial perfusion gated SPECT  imaging was performed (LVEF > 70%;LVEDV = 47 ml.) and  showed normal wall motion.    *** Conclusions ***  There is SPECT evidence of mild ischemia during  Regadenoson  pharmacologic stress in the distribution of  LAD.  ------------------------------------------------------------------------  Confirmed on  3/7/2017 - 14:14:32 at  by Krissy Espitia MD  < end of copied text >    < from: CT Head No Cont (10.29.18 @ 09:51) >  IMPRESSION:    CT brain:    No acute intracranial hemorrhage or mass effect. No displaced calvarial   fracture.    CT cervical spine:    No acute fracture or traumatic subluxation. No prevertebral soft tissue   swelling. Degenerative changes.  < end of copied text >      < from: CT Abdomen and Pelvis w/ IV Cont (10.29.18 @ 09:55) >  IMPRESSION:     No CT evidence of acute vascular injury or solid organ injury in the   abdomen or pelvis.    Bilateral indeterminate renal lesions, stable since 01/26/2018, but   increased from 2014, concerning for renal neoplasms.  < end of copied text >  	  LABS:	 	                       10.1   7.02  )-----------( 234      ( 30 Oct 2018 06:17 )             31.3     140  |  104  |  10  ----------------------------<  96  4.2   |  25  |  1.22    Ca    8.6      30 Oct 2018 06:17  Mg     1.0     10-30    TPro  7.1  /  Alb  3.5  /  TBili  1.6<H>  /  DBili  x   /  AST  19  /  ALT  11  /  AlkPhos  90  10-29    HgA1c: Hemoglobin A1C, Whole Blood: 6.7 % (10-30 @ 06:17)    TSH: Thyroid Stimulating Hormone, Serum: 2.77 uIU/mL (10-30 @ 06:17)    TROP T  34--32  --217    ASSESSMENT/PLAN:     89 y/o female with a PMHx of arthritis, ?CKD, HTN, HLD and DM presents s/p unwitnessed fall.  She reports her legs gave out, denies LOC.  Reports frequent falls at home due to weakness.  Denies PMH CAD, syncope.    --TTE noted  --Check orthostatics  --replete lytes  --unable to obtain orthostatics  --PT eval  --Pt with hx NL LV function on last echo in system 10/2016.  She underwent NST 3/2017 revealing mild anterior wall ischemia, but does not appear she had any further invasive work up.    --Recommend repeat TTE and have further discussion regarding GOC julissa all pertinent systems normal

## 2020-06-10 NOTE — PATIENT PROFILE ADULT - NSASFUNCLEVELADLTRANSFER_GEN_A_NUR
CT head without and with IV contrast



CT arteriogram head with IV contrast and 3-D imaging



HISTORY: Altered mental status.



COMPARISON: 3/3/2007.



FINDINGS: There is no evidence of acute intracranial hemorrhage or infarct. The ventricles appear nor
mal in size, shape and position.



There is no mass effect or shift of midline structures. No abnormal areas of contrast enhancement.



Mild arterial calcification within the internal carotid arteries.



Iselin of [place] is intact. Good flow into each cerebral and cerebellar system. No focal thrombus or 
aneurysm.



Mild mucosal thickening of the maxillary sinuses and ethmoid air cells.



  



IMPRESSION :

No acute intracranial or vascular abnormalities are demonstrated.



Reported By: GÓMEZ Motley 

Electronically Signed:  6/10/2020 4:18 PM 3 = assistive equipment and person

## 2020-09-08 NOTE — PATIENT PROFILE ADULT - NSPROPOAURINARYCATHETER_GEN_A_NUR
I reviewed the H&P, I examined the patient, and there are no changes in the patient's condition.    Lungs   CTA  Cardiac  RRR  abd  Soft  Neuro  Intact   no

## 2020-09-21 DIAGNOSIS — N18.9 CHRONIC KIDNEY DISEASE, UNSPECIFIED: ICD-10-CM

## 2020-09-21 DIAGNOSIS — M19.90 UNSPECIFIED OSTEOARTHRITIS, UNSPECIFIED SITE: ICD-10-CM

## 2020-09-21 DIAGNOSIS — E78.5 HYPERLIPIDEMIA, UNSPECIFIED: ICD-10-CM

## 2020-09-21 DIAGNOSIS — Z86.19 PERSONAL HISTORY OF OTHER INFECTIOUS AND PARASITIC DISEASES: ICD-10-CM

## 2020-09-23 ENCOUNTER — NON-APPOINTMENT (OUTPATIENT)
Age: 85
End: 2020-09-23

## 2020-09-23 ENCOUNTER — APPOINTMENT (OUTPATIENT)
Dept: CARDIOLOGY | Facility: CLINIC | Age: 85
End: 2020-09-23
Payer: MEDICARE

## 2020-09-23 VITALS
OXYGEN SATURATION: 96 % | WEIGHT: 160 LBS | SYSTOLIC BLOOD PRESSURE: 179 MMHG | TEMPERATURE: 94.3 F | DIASTOLIC BLOOD PRESSURE: 87 MMHG | HEIGHT: 66 IN | RESPIRATION RATE: 16 BRPM | BODY MASS INDEX: 25.71 KG/M2 | HEART RATE: 73 BPM

## 2020-09-23 PROCEDURE — 93000 ELECTROCARDIOGRAM COMPLETE: CPT

## 2020-09-23 PROCEDURE — 99204 OFFICE O/P NEW MOD 45 MIN: CPT

## 2020-09-23 RX ORDER — ERGOCALCIFEROL (VITAMIN D2) 1250 MCG
50000 CAPSULE ORAL
Refills: 0 | Status: ACTIVE | COMMUNITY

## 2020-09-23 RX ORDER — CILOSTAZOL 100 MG/1
100 TABLET ORAL TWICE DAILY
Refills: 0 | Status: ACTIVE | COMMUNITY

## 2020-09-23 RX ORDER — ASPIRIN 81 MG
81 TABLET, DELAYED RELEASE (ENTERIC COATED) ORAL DAILY
Refills: 0 | Status: ACTIVE | COMMUNITY

## 2020-09-23 RX ORDER — ACETAMINOPHEN 325 MG/1
TABLET, FILM COATED ORAL
Refills: 0 | Status: DISCONTINUED | COMMUNITY
End: 2020-09-23

## 2020-09-23 RX ORDER — BISACODYL 5 MG/1
5 TABLET, DELAYED RELEASE ORAL
Refills: 0 | Status: DISCONTINUED | COMMUNITY
End: 2020-09-23

## 2020-09-23 RX ORDER — GABAPENTIN 100 MG
100 TABLET ORAL
Refills: 0 | Status: DISCONTINUED | COMMUNITY
End: 2020-09-23

## 2020-09-23 RX ORDER — METOPROLOL TARTRATE 25 MG/1
25 TABLET, FILM COATED ORAL DAILY
Refills: 0 | Status: ACTIVE | COMMUNITY

## 2020-09-23 RX ORDER — LOVASTATIN 10 MG/1
10 TABLET ORAL DAILY
Refills: 0 | Status: ACTIVE | COMMUNITY

## 2020-09-23 RX ORDER — GABAPENTIN 100 MG/1
100 CAPSULE ORAL 3 TIMES DAILY
Qty: 180 | Refills: 5 | Status: DISCONTINUED | COMMUNITY
Start: 2019-11-04 | End: 2020-09-23

## 2020-09-23 NOTE — HISTORY OF PRESENT ILLNESS
[FreeTextEntry1] : 89 Yr F. HTN. 2017 Normal EF, Nuclear stress test small apical ischemia EF normal. \par Patient is hard of hearing, denies chest pain, dyspnea. \par She is going for dental extraction under general anesthesia.

## 2020-09-23 NOTE — REVIEW OF SYSTEMS
[Fever] : no fever [Blurry Vision] : no blurred vision [see HPI] : see HPI [Earache] : no earache [Shortness Of Breath] : no shortness of breath [Chest Pain] : no chest pain [Cough] : no cough [Abdominal Pain] : no abdominal pain [Heartburn] : no heartburn [Dysuria] : no dysuria [Incontinence] : no incontinence [Confusion] : no confusion was observed [Easy Bleeding] : no tendency for easy bleeding

## 2020-09-23 NOTE — PHYSICAL EXAM
[General Appearance - Well Developed] : well developed [Normal Conjunctiva] : the conjunctiva exhibited no abnormalities [Normal Oropharynx] : normal oropharynx [Normal Jugular Venous V Waves Present] : normal jugular venous V waves present [Heart Rate And Rhythm] : heart rate and rhythm were normal [Heart Sounds] : normal S1 and S2 [] : no respiratory distress [Nail Clubbing] : no clubbing of the fingernails [Skin Color & Pigmentation] : normal skin color and pigmentation

## 2020-09-23 NOTE — DISCUSSION/SUMMARY
[FreeTextEntry1] : Preop: Patient has no clinical symptoms of heart failure, arrythmias, ischemia or obstructive valve. ds. \par She is optimized for dental extraction under GA.  No further CV testing a this time. \par \par HTN: BP is elevated. she will fu PCP.

## 2020-09-23 NOTE — REASON FOR VISIT
[Initial Evaluation] : an initial evaluation of [Hypertension] : hypertension [FreeTextEntry1] : preop

## 2021-10-13 NOTE — PROGRESS NOTE ADULT - PROBLEM SELECTOR PLAN 4
I reviewed the H&P, I examined the patient, and there are no changes in the patient's condition.    
acceptable  will continue to monitor  continue home meds with hold parameters
continue statin
acceptable  will continue to monitor  continue home meds with hold parameters

## 2022-03-03 NOTE — ED ADULT NURSE NOTE - PAIN: BODY LOCATION
[FreeTextEntry1] : CT neck reviewed; wnl\par PSG with mild MONICA, following up with pulmonology in April\par Will obtain esophagram to eval dysphagia and r/o Zenker's considering dysphagia and regurgitation\par Seeing psych for her anxiety, currently on Ativan suprapubic area and RLQ

## 2023-03-15 NOTE — PATIENT PROFILE ADULT - NSPROGENBLOODRESTRICT_GEN_A_NUR
[FreeTextEntry1] : - Symptoms are improving since the weekend\par - Start Omeprazole 20mg PO daily\par - Follow up in 2-3 weeks or PRN 
none

## 2024-06-19 NOTE — ED PROVIDER NOTE - PHYSICAL EXAMINATION
Detail Level: Detailed
General Sunscreen Counseling: I recommended a broad spectrum sunscreen with a SPF of 30 or higher.  I explained that SPF 30 sunscreens block approximately 97 percent of the sun's harmful rays.  Sunscreens should be applied at least 15 minutes prior to expected sun exposure and then every 2 hours after that as long as sun exposure continues. If swimming or exercising sunscreen should be reapplied every 80 minutes to an hour after getting wet or sweating.  One ounce, or the equivalent of a shot glass full of sunscreen, is adequate to protect the skin not covered by a bathing suit. I also recommended a lip balm with a sunscreen as well. Sun protective clothing can be used in lieu of sunscreen but must be worn the entire time you are exposed to the sun's rays.
Extremities: Pulses not palpable in the feet, but dopplerable (DP and posterior tibial b/l). Painful on palpation of the feet at the lower third of the shin and throughout the toes. Darkened skin around the toes. No ulcers or skin breakdown noted.

## 2024-06-22 NOTE — ED PROVIDER NOTE - PHYSICAL EXAMINATION
General: well appearing, interactive, well nourished, mild distress  HEENT: extraocular movements intact, pupils equal and reactive, normal mucosa, normal oropharynx, no lesions on the lips or on oral mucosa, normal external ear  Neck: supple, no lymphadenopathy, full range of motion, no nuchal rigidity, no spinal midline TTP  CV: regular rate and rhythm  Resp: lungs clear to auscultation bilaterally, good aeration bilaterally, symmetric chest wall   Abd: non-distended, soft, TTP   : no CVA tenderness  MSK: full range of motion passive ROM in UE/LE b/l, no cyanosis, no edema, no clubbing, pain with hip rocking b/l, pain with palpation of knees b/l  Neuro: cranial nerves intact, muscle strength 4/5 in all extremities  Skin: no rashes, skin intact
22-Jun-2024 22:48

## 2025-02-24 NOTE — ED ADULT NURSE NOTE - CAS EDN DISCHARGE INTERVENTIONS
Patient awake and alert, RR unlabored, skin is warm and dry. VSS. Discharge instructions provided and explained to parent. Script for medication sent to preferred pharmacy- dosing and frequency explained. Parent verbalized understanding of all instructions. Parent aware to follow-up with PCP as instructed. Aware to come back to ED if difficulty breathing, persistent fevers for five or more days in a row, unable to tolerate PO intake, persistent vomiting, decreased UOP, uncontrolled or increased pain, or worsening symptoms. All questions were answered. Pt discharged home in stable condition.      Tawnya ([id number]) utilized for discharge teaching and instructions    IV intact/Arm band on